# Patient Record
Sex: FEMALE | Race: WHITE | NOT HISPANIC OR LATINO | Employment: UNEMPLOYED | URBAN - NONMETROPOLITAN AREA
[De-identification: names, ages, dates, MRNs, and addresses within clinical notes are randomized per-mention and may not be internally consistent; named-entity substitution may affect disease eponyms.]

---

## 2022-08-22 LAB
CHOLESTEROL, TOTAL: 232 MG/DL (ref 0–199)
HBA1C MFR BLD: 6 % (ref 4–5.6)
HDLC SERPL-MCNC: 45 MG/DL
LDL CHOLESTEROL CALCULATED: 132 MG/DL (ref 0–99)
TRIGL SERPL-MCNC: 277 MG/DL (ref 0–149)
VLDLC SERPL CALC-MCNC: 55 MG/DL

## 2024-04-10 ENCOUNTER — HOSPITAL ENCOUNTER (INPATIENT)
Facility: HOSPITAL | Age: 59
LOS: 3 days | Discharge: HOME | End: 2024-04-13
Attending: EMERGENCY MEDICINE | Admitting: INTERNAL MEDICINE
Payer: MEDICAID

## 2024-04-10 ENCOUNTER — APPOINTMENT (OUTPATIENT)
Dept: RADIOLOGY | Facility: HOSPITAL | Age: 59
End: 2024-04-10
Payer: MEDICAID

## 2024-04-10 ENCOUNTER — APPOINTMENT (OUTPATIENT)
Dept: CARDIOLOGY | Facility: HOSPITAL | Age: 59
End: 2024-04-10
Payer: MEDICAID

## 2024-04-10 DIAGNOSIS — E87.1 HYPONATREMIA: ICD-10-CM

## 2024-04-10 DIAGNOSIS — E87.6 HYPOKALEMIA: ICD-10-CM

## 2024-04-10 DIAGNOSIS — R56.9 SEIZURE (MULTI): Primary | ICD-10-CM

## 2024-04-10 DIAGNOSIS — E83.42 HYPOMAGNESEMIA: ICD-10-CM

## 2024-04-10 DIAGNOSIS — E03.9 HYPOTHYROIDISM, UNSPECIFIED TYPE: ICD-10-CM

## 2024-04-10 DIAGNOSIS — K59.00 CONSTIPATION, UNSPECIFIED CONSTIPATION TYPE: ICD-10-CM

## 2024-04-10 LAB
ALBUMIN SERPL BCP-MCNC: 4.4 G/DL (ref 3.4–5)
ALP SERPL-CCNC: 62 U/L (ref 33–110)
ALT SERPL W P-5'-P-CCNC: 15 U/L (ref 7–45)
ANION GAP SERPL CALC-SCNC: 13 MMOL/L (ref 10–20)
APPEARANCE UR: CLEAR
AST SERPL W P-5'-P-CCNC: 17 U/L (ref 9–39)
BASOPHILS # BLD AUTO: 0.09 X10*3/UL (ref 0–0.1)
BASOPHILS NFR BLD AUTO: 0.6 %
BILIRUB SERPL-MCNC: 0.3 MG/DL (ref 0–1.2)
BILIRUB UR STRIP.AUTO-MCNC: NEGATIVE MG/DL
BUN SERPL-MCNC: 10 MG/DL (ref 6–23)
CALCIUM SERPL-MCNC: 9.2 MG/DL (ref 8.6–10.3)
CARDIAC TROPONIN I PNL SERPL HS: 8 NG/L (ref 0–13)
CHLORIDE SERPL-SCNC: 87 MMOL/L (ref 98–107)
CO2 SERPL-SCNC: 23 MMOL/L (ref 21–32)
COLOR UR: NORMAL
CREAT SERPL-MCNC: 0.99 MG/DL (ref 0.5–1.05)
EGFRCR SERPLBLD CKD-EPI 2021: 67 ML/MIN/1.73M*2
EOSINOPHIL # BLD AUTO: 0.11 X10*3/UL (ref 0–0.7)
EOSINOPHIL NFR BLD AUTO: 0.8 %
ERYTHROCYTE [DISTWIDTH] IN BLOOD BY AUTOMATED COUNT: 13.2 % (ref 11.5–14.5)
GLUCOSE SERPL-MCNC: 102 MG/DL (ref 74–99)
GLUCOSE UR STRIP.AUTO-MCNC: NORMAL MG/DL
HCT VFR BLD AUTO: 36 % (ref 36–46)
HGB BLD-MCNC: 12.8 G/DL (ref 12–16)
IMM GRANULOCYTES # BLD AUTO: 0.07 X10*3/UL (ref 0–0.7)
IMM GRANULOCYTES NFR BLD AUTO: 0.5 % (ref 0–0.9)
KETONES UR STRIP.AUTO-MCNC: NEGATIVE MG/DL
LEUKOCYTE ESTERASE UR QL STRIP.AUTO: NEGATIVE
LYMPHOCYTES # BLD AUTO: 2.32 X10*3/UL (ref 1.2–4.8)
LYMPHOCYTES NFR BLD AUTO: 16.3 %
MAGNESIUM SERPL-MCNC: 1.41 MG/DL (ref 1.6–2.4)
MCH RBC QN AUTO: 32 PG (ref 26–34)
MCHC RBC AUTO-ENTMCNC: 35.6 G/DL (ref 32–36)
MCV RBC AUTO: 90 FL (ref 80–100)
MONOCYTES # BLD AUTO: 0.78 X10*3/UL (ref 0.1–1)
MONOCYTES NFR BLD AUTO: 5.5 %
NEUTROPHILS # BLD AUTO: 10.87 X10*3/UL (ref 1.2–7.7)
NEUTROPHILS NFR BLD AUTO: 76.3 %
NITRITE UR QL STRIP.AUTO: NEGATIVE
NRBC BLD-RTO: 0 /100 WBCS (ref 0–0)
PH UR STRIP.AUTO: 6.5 [PH]
PLATELET # BLD AUTO: 352 X10*3/UL (ref 150–450)
POTASSIUM SERPL-SCNC: 3.1 MMOL/L (ref 3.5–5.3)
PROT SERPL-MCNC: 6.8 G/DL (ref 6.4–8.2)
PROT UR STRIP.AUTO-MCNC: NEGATIVE MG/DL
RBC # BLD AUTO: 4 X10*6/UL (ref 4–5.2)
RBC # UR STRIP.AUTO: NEGATIVE /UL
SODIUM SERPL-SCNC: 120 MMOL/L (ref 136–145)
SP GR UR STRIP.AUTO: 1.01
UROBILINOGEN UR STRIP.AUTO-MCNC: NORMAL MG/DL
WBC # BLD AUTO: 14.2 X10*3/UL (ref 4.4–11.3)

## 2024-04-10 PROCEDURE — 2500000004 HC RX 250 GENERAL PHARMACY W/ HCPCS (ALT 636 FOR OP/ED): Mod: IPSPLIT | Performed by: INTERNAL MEDICINE

## 2024-04-10 PROCEDURE — 2020000001 HC ICU ROOM DAILY: Mod: IPSPLIT

## 2024-04-10 PROCEDURE — 2500000002 HC RX 250 W HCPCS SELF ADMINISTERED DRUGS (ALT 637 FOR MEDICARE OP, ALT 636 FOR OP/ED): Mod: SE | Performed by: EMERGENCY MEDICINE

## 2024-04-10 PROCEDURE — 71250 CT THORAX DX C-: CPT | Performed by: RADIOLOGY

## 2024-04-10 PROCEDURE — 71250 CT THORAX DX C-: CPT

## 2024-04-10 PROCEDURE — 80053 COMPREHEN METABOLIC PANEL: CPT | Performed by: EMERGENCY MEDICINE

## 2024-04-10 PROCEDURE — 9420000001 HC RT PATIENT EDUCATION 5 MIN: Mod: IPSPLIT

## 2024-04-10 PROCEDURE — 70450 CT HEAD/BRAIN W/O DYE: CPT

## 2024-04-10 PROCEDURE — 2500000001 HC RX 250 WO HCPCS SELF ADMINISTERED DRUGS (ALT 637 FOR MEDICARE OP): Mod: IPSPLIT | Performed by: INTERNAL MEDICINE

## 2024-04-10 PROCEDURE — 2500000004 HC RX 250 GENERAL PHARMACY W/ HCPCS (ALT 636 FOR OP/ED): Mod: SE | Performed by: EMERGENCY MEDICINE

## 2024-04-10 PROCEDURE — 2500000001 HC RX 250 WO HCPCS SELF ADMINISTERED DRUGS (ALT 637 FOR MEDICARE OP): Mod: SE | Performed by: EMERGENCY MEDICINE

## 2024-04-10 PROCEDURE — 99285 EMERGENCY DEPT VISIT HI MDM: CPT | Mod: 25

## 2024-04-10 PROCEDURE — 83735 ASSAY OF MAGNESIUM: CPT | Performed by: EMERGENCY MEDICINE

## 2024-04-10 PROCEDURE — 70450 CT HEAD/BRAIN W/O DYE: CPT | Performed by: RADIOLOGY

## 2024-04-10 PROCEDURE — 81003 URINALYSIS AUTO W/O SCOPE: CPT | Performed by: EMERGENCY MEDICINE

## 2024-04-10 PROCEDURE — 93010 ELECTROCARDIOGRAM REPORT: CPT | Performed by: INTERNAL MEDICINE

## 2024-04-10 PROCEDURE — 93005 ELECTROCARDIOGRAM TRACING: CPT | Mod: IPSPLIT

## 2024-04-10 PROCEDURE — G0426 INPT/ED TELECONSULT50: HCPCS | Performed by: INTERNAL MEDICINE

## 2024-04-10 PROCEDURE — 36415 COLL VENOUS BLD VENIPUNCTURE: CPT | Performed by: EMERGENCY MEDICINE

## 2024-04-10 PROCEDURE — 85025 COMPLETE CBC W/AUTO DIFF WBC: CPT | Performed by: EMERGENCY MEDICINE

## 2024-04-10 PROCEDURE — 84484 ASSAY OF TROPONIN QUANT: CPT | Performed by: EMERGENCY MEDICINE

## 2024-04-10 RX ORDER — MAGNESIUM SULFATE HEPTAHYDRATE 40 MG/ML
2 INJECTION, SOLUTION INTRAVENOUS ONCE
Status: COMPLETED | OUTPATIENT
Start: 2024-04-10 | End: 2024-04-10

## 2024-04-10 RX ORDER — FLUTICASONE FUROATE AND VILANTEROL 100; 25 UG/1; UG/1
1 POWDER RESPIRATORY (INHALATION)
Status: DISCONTINUED | OUTPATIENT
Start: 2024-04-11 | End: 2024-04-13 | Stop reason: HOSPADM

## 2024-04-10 RX ORDER — LACOSAMIDE 100 MG/1
50 TABLET ORAL ONCE
Status: COMPLETED | OUTPATIENT
Start: 2024-04-10 | End: 2024-04-10

## 2024-04-10 RX ORDER — POTASSIUM CHLORIDE 14.9 MG/ML
20 INJECTION INTRAVENOUS ONCE
Status: COMPLETED | OUTPATIENT
Start: 2024-04-10 | End: 2024-04-11

## 2024-04-10 RX ORDER — MIDAZOLAM HYDROCHLORIDE 1 MG/ML
2 INJECTION INTRAMUSCULAR; INTRAVENOUS ONCE
Status: COMPLETED | OUTPATIENT
Start: 2024-04-10 | End: 2024-04-10

## 2024-04-10 RX ORDER — LAMOTRIGINE 25 MG/1
75 TABLET ORAL DAILY
Status: DISCONTINUED | OUTPATIENT
Start: 2024-04-11 | End: 2024-04-13 | Stop reason: HOSPADM

## 2024-04-10 RX ORDER — IPRATROPIUM BROMIDE AND ALBUTEROL SULFATE 2.5; .5 MG/3ML; MG/3ML
3 SOLUTION RESPIRATORY (INHALATION) EVERY 6 HOURS PRN
Status: DISCONTINUED | OUTPATIENT
Start: 2024-04-10 | End: 2024-04-11

## 2024-04-10 RX ORDER — GABAPENTIN 300 MG/1
300 CAPSULE ORAL EVERY 8 HOURS SCHEDULED
Status: DISCONTINUED | OUTPATIENT
Start: 2024-04-10 | End: 2024-04-13 | Stop reason: HOSPADM

## 2024-04-10 RX ORDER — TALC
6 POWDER (GRAM) TOPICAL NIGHTLY
Status: DISCONTINUED | OUTPATIENT
Start: 2024-04-10 | End: 2024-04-13 | Stop reason: HOSPADM

## 2024-04-10 RX ORDER — MAGNESIUM SULFATE HEPTAHYDRATE 40 MG/ML
2 INJECTION, SOLUTION INTRAVENOUS ONCE
Status: COMPLETED | OUTPATIENT
Start: 2024-04-10 | End: 2024-04-11

## 2024-04-10 RX ORDER — POTASSIUM CHLORIDE 20 MEQ/1
40 TABLET, EXTENDED RELEASE ORAL ONCE
Status: COMPLETED | OUTPATIENT
Start: 2024-04-10 | End: 2024-04-10

## 2024-04-10 RX ADMIN — Medication 6 MG: at 23:31

## 2024-04-10 RX ADMIN — MAGNESIUM SULFATE IN WATER 2 G: 40 INJECTION, SOLUTION INTRAVENOUS at 23:24

## 2024-04-10 RX ADMIN — MIDAZOLAM HYDROCHLORIDE 2 MG: 1 INJECTION, SOLUTION INTRAMUSCULAR; INTRAVENOUS at 17:52

## 2024-04-10 RX ADMIN — SODIUM CHLORIDE 1000 ML: 9 INJECTION, SOLUTION INTRAVENOUS at 17:52

## 2024-04-10 RX ADMIN — MAGNESIUM SULFATE IN WATER 2 G: 40 INJECTION, SOLUTION INTRAVENOUS at 18:02

## 2024-04-10 RX ADMIN — LACOSAMIDE 50 MG: 100 TABLET, FILM COATED ORAL at 17:51

## 2024-04-10 RX ADMIN — GABAPENTIN 300 MG: 300 CAPSULE ORAL at 23:31

## 2024-04-10 RX ADMIN — POTASSIUM CHLORIDE 40 MEQ: 1500 TABLET, EXTENDED RELEASE ORAL at 18:04

## 2024-04-10 RX ADMIN — POTASSIUM CHLORIDE 20 MEQ: 200 INJECTION, SOLUTION INTRAVENOUS at 22:39

## 2024-04-10 SDOH — SOCIAL STABILITY: SOCIAL INSECURITY: DO YOU FEEL UNSAFE GOING BACK TO THE PLACE WHERE YOU ARE LIVING?: NO

## 2024-04-10 SDOH — SOCIAL STABILITY: SOCIAL INSECURITY: WERE YOU ABLE TO COMPLETE ALL THE BEHAVIORAL HEALTH SCREENINGS?: YES

## 2024-04-10 SDOH — SOCIAL STABILITY: SOCIAL INSECURITY: DOES ANYONE TRY TO KEEP YOU FROM HAVING/CONTACTING OTHER FRIENDS OR DOING THINGS OUTSIDE YOUR HOME?: NO

## 2024-04-10 SDOH — SOCIAL STABILITY: SOCIAL INSECURITY: DO YOU FEEL ANYONE HAS EXPLOITED OR TAKEN ADVANTAGE OF YOU FINANCIALLY OR OF YOUR PERSONAL PROPERTY?: NO

## 2024-04-10 SDOH — SOCIAL STABILITY: SOCIAL INSECURITY: ABUSE: ADULT

## 2024-04-10 SDOH — SOCIAL STABILITY: SOCIAL INSECURITY: HAVE YOU HAD THOUGHTS OF HARMING ANYONE ELSE?: NO

## 2024-04-10 SDOH — SOCIAL STABILITY: SOCIAL INSECURITY: HAS ANYONE EVER THREATENED TO HURT YOUR FAMILY OR YOUR PETS?: NO

## 2024-04-10 SDOH — SOCIAL STABILITY: SOCIAL INSECURITY: ARE THERE ANY APPARENT SIGNS OF INJURIES/BEHAVIORS THAT COULD BE RELATED TO ABUSE/NEGLECT?: NO

## 2024-04-10 SDOH — SOCIAL STABILITY: SOCIAL INSECURITY: ARE YOU OR HAVE YOU BEEN THREATENED OR ABUSED PHYSICALLY, EMOTIONALLY, OR SEXUALLY BY ANYONE?: NO

## 2024-04-10 ASSESSMENT — COGNITIVE AND FUNCTIONAL STATUS - GENERAL
TURNING FROM BACK TO SIDE WHILE IN FLAT BAD: A LITTLE
HELP NEEDED FOR BATHING: A LITTLE
MOVING TO AND FROM BED TO CHAIR: A LITTLE
DRESSING REGULAR UPPER BODY CLOTHING: A LITTLE
PATIENT BASELINE BEDBOUND: NO
DAILY ACTIVITIY SCORE: 19
MOVING FROM LYING ON BACK TO SITTING ON SIDE OF FLAT BED WITH BEDRAILS: A LITTLE
TOILETING: A LITTLE
WALKING IN HOSPITAL ROOM: A LITTLE
STANDING UP FROM CHAIR USING ARMS: A LITTLE
CLIMB 3 TO 5 STEPS WITH RAILING: A LITTLE
MOBILITY SCORE: 18
DRESSING REGULAR LOWER BODY CLOTHING: A LITTLE
PERSONAL GROOMING: A LITTLE

## 2024-04-10 ASSESSMENT — LIFESTYLE VARIABLES
AUDIT-C TOTAL SCORE: 1
SUBSTANCE_ABUSE_PAST_12_MONTHS: NO
HOW OFTEN DO YOU HAVE 6 OR MORE DRINKS ON ONE OCCASION: NEVER
AUDIT-C TOTAL SCORE: 1
PRESCIPTION_ABUSE_PAST_12_MONTHS: NO
SKIP TO QUESTIONS 9-10: 1
HOW OFTEN DO YOU HAVE A DRINK CONTAINING ALCOHOL: MONTHLY OR LESS
HOW MANY STANDARD DRINKS CONTAINING ALCOHOL DO YOU HAVE ON A TYPICAL DAY: 1 OR 2

## 2024-04-10 ASSESSMENT — ACTIVITIES OF DAILY LIVING (ADL)
WALKS IN HOME: INDEPENDENT
HEARING - RIGHT EAR: FUNCTIONAL
ADEQUATE_TO_COMPLETE_ADL: YES
BATHING: NEEDS ASSISTANCE
JUDGMENT_ADEQUATE_SAFELY_COMPLETE_DAILY_ACTIVITIES: NO
TOILETING: NEEDS ASSISTANCE
HEARING - LEFT EAR: FUNCTIONAL
GROOMING: NEEDS ASSISTANCE
FEEDING YOURSELF: NEEDS ASSISTANCE
DRESSING YOURSELF: NEEDS ASSISTANCE
LACK_OF_TRANSPORTATION: NO
LACK_OF_TRANSPORTATION: NO
PATIENT'S MEMORY ADEQUATE TO SAFELY COMPLETE DAILY ACTIVITIES?: YES

## 2024-04-10 ASSESSMENT — PAIN SCALES - GENERAL
PAINLEVEL_OUTOF10: 5 - MODERATE PAIN
PAINLEVEL_OUTOF10: 10 - WORST POSSIBLE PAIN

## 2024-04-10 ASSESSMENT — COLUMBIA-SUICIDE SEVERITY RATING SCALE - C-SSRS
1. IN THE PAST MONTH, HAVE YOU WISHED YOU WERE DEAD OR WISHED YOU COULD GO TO SLEEP AND NOT WAKE UP?: NO
2. HAVE YOU ACTUALLY HAD ANY THOUGHTS OF KILLING YOURSELF?: NO
2. HAVE YOU ACTUALLY HAD ANY THOUGHTS OF KILLING YOURSELF?: NO
6. HAVE YOU EVER DONE ANYTHING, STARTED TO DO ANYTHING, OR PREPARED TO DO ANYTHING TO END YOUR LIFE?: NO
6. HAVE YOU EVER DONE ANYTHING, STARTED TO DO ANYTHING, OR PREPARED TO DO ANYTHING TO END YOUR LIFE?: NO
1. IN THE PAST MONTH, HAVE YOU WISHED YOU WERE DEAD OR WISHED YOU COULD GO TO SLEEP AND NOT WAKE UP?: NO

## 2024-04-10 ASSESSMENT — PATIENT HEALTH QUESTIONNAIRE - PHQ9
1. LITTLE INTEREST OR PLEASURE IN DOING THINGS: NOT AT ALL
2. FEELING DOWN, DEPRESSED OR HOPELESS: NOT AT ALL
SUM OF ALL RESPONSES TO PHQ9 QUESTIONS 1 & 2: 0

## 2024-04-10 ASSESSMENT — PAIN DESCRIPTION - LOCATION: LOCATION: HEAD

## 2024-04-10 ASSESSMENT — PAIN DESCRIPTION - PAIN TYPE: TYPE: ACUTE PAIN

## 2024-04-10 ASSESSMENT — PAIN DESCRIPTION - DESCRIPTORS: DESCRIPTORS: SHARP

## 2024-04-10 ASSESSMENT — PAIN - FUNCTIONAL ASSESSMENT: PAIN_FUNCTIONAL_ASSESSMENT: 0-10

## 2024-04-10 NOTE — ED PROVIDER NOTES
HPI   Chief Complaint   Patient presents with    Seizures     Patient is alert  at this time. Patient states she is out of seizure medication has been for 3 days       HPI  Patient is a 58-year-old female presenting to the ED for seizures.  Patient does have a history of seizures but states that she has been out of her Vimpat for the past 3 days.  She states that she forgot to call her neurologist for a refill.  Today, patient had 3 seizures at home witnessed by her sister.  After the third seizure, sister called EMS to bring patient here to the ED for further management.  At this time, patient just reports feeling tired and weak.  She denies any areas of pain.  She denies any headache, neck pain, chest pain, abdominal pain.  She has no additional concerns at this time                  [place] Coma Scale Score: 15                     Patient History   Past Medical History:   Diagnosis Date    Other specified anxiety disorders 01/03/2022    Depression with anxiety     Past Surgical History:   Procedure Laterality Date    FOOT SURGERY  10/13/2014    Foot Surgery     No family history on file.  Social History     Tobacco Use    Smoking status: Not on file    Smokeless tobacco: Not on file   Substance Use Topics    Alcohol use: Not on file    Drug use: Not on file       Physical Exam   ED Triage Vitals   Temperature Heart Rate Respirations BP   04/10/24 1800 04/10/24 1637 04/10/24 1637 04/10/24 1637   36.5 °C (97.7 °F) 54 16 102/68      Pulse Ox Temp Source Heart Rate Source Patient Position   04/10/24 1637 04/10/24 1800 04/10/24 1637 04/10/24 1637   100 % Oral Monitor Lying      BP Location FiO2 (%)     04/10/24 1637 --     Right arm        Physical Exam  Vitals and nursing note reviewed.   Constitutional:       General: She is not in acute distress.     Appearance: She is not toxic-appearing.   HENT:      Head:      Comments: Ecchymosis present to the chin.  No periorbital or postauricular ecchymosis     Mouth/Throat:       Mouth: Mucous membranes are moist.   Eyes:      Extraocular Movements: Extraocular movements intact.      Conjunctiva/sclera: Conjunctivae normal.      Pupils: Pupils are equal, round, and reactive to light.   Cardiovascular:      Rate and Rhythm: Regular rhythm. Bradycardia present.      Pulses: Normal pulses.   Pulmonary:      Effort: Pulmonary effort is normal. No respiratory distress.      Breath sounds: Normal breath sounds. No wheezing.   Abdominal:      General: There is no distension.      Palpations: Abdomen is soft.      Tenderness: There is no abdominal tenderness.   Musculoskeletal:         General: No swelling.      Cervical back: Neck supple.   Skin:     General: Skin is warm and dry.      Capillary Refill: Capillary refill takes less than 2 seconds.   Neurological:      General: No focal deficit present.      Mental Status: She is alert. Mental status is at baseline.      Comments: This patient is awake, alert and oriented to person, place and time. Speech is clear and fluent. Cranial nerves II-XII are grossly intact. Strength and sensation are intact in all extremities. No limb ataxia. No pronator drift. Patellar reflex normal and symmetric. No dysmetria detected on finger-to-nose test.         ED Course & MDM   ED Course as of 04/10/24 1848   Wed Apr 10, 2024   1640 EKG obtained at 1636, interpreted by myself.  Sinus bradycardia with a ventricular rate of 55, first-degree AV block present with prolonged SC interval of 218, right bundle branch block present, otherwise normal intervals with no acute ischemic changes [VT]      ED Course User Index  [VT] Reema ALVAREZ MD         Diagnoses as of 04/10/24 1848   Seizure (CMS/[facility])   Hyponatremia   Hypokalemia   Hypomagnesemia       Medical Decision Making  Patient was seen and evaluated in the ED for seizure in the setting of medication noncompliance.  Differential diagnosis also includes but is not limited to infection, dehydration, electrolyte  abnormality.  Patient is placed in seizure precautions.  She is put on a cardiac monitor with continuous pulse ox.  Peripheral IV is established and patient is started on a 1 L bolus of normal saline.  She is also administered Versed 2 mg IV as well as her home dose of Vimpat 50 mg.  Additional labs and imaging are ordered for further evaluation of her symptoms.  Lab work is significant for hyponatremia with a sodium of 120.  Patient is also mildly hypokalemic and hypomagnesemic.  She is administered 40 mill equivalents KCl as well as magnesium sulfate 2 g.  CT head wo IV contrast   Final Result   No acute intracranial abnormality.        Chronic changes as noted above.        MACRO:   None        Signed by: Alvin Rinaldi 4/10/2024 6:06 PM   Dictation workstation:   [id number]      CT chest wo IV contrast   Final Result   No acute cardiopulmonary process.        Additional findings as noted above.        MACRO:   None        Signed by: Alvin Rinaldi 4/10/2024 6:11 PM   Dictation workstation:   [id number]      On reevaluation, patient is resting comfortably in bed.  She continues to be slightly bradycardic. Patient was informed of their lab and imaging results, and all questions and concerns were answered.  Given her significant hyponatremia, admission planning for further management was discussed at this time, to which the patient was agreeable.  I discussed the case with JOSE Blair on-call for the hospitalist service, who accepts patient for admission under Dr. Blackburn.     Procedure  Procedures     Reema ALVAREZ MD  04/10/24 2864

## 2024-04-11 ENCOUNTER — APPOINTMENT (OUTPATIENT)
Dept: CARDIOLOGY | Facility: HOSPITAL | Age: 59
End: 2024-04-11
Payer: MEDICAID

## 2024-04-11 LAB
ALBUMIN SERPL BCP-MCNC: 4.4 G/DL (ref 3.4–5)
ALP SERPL-CCNC: 55 U/L (ref 33–110)
ALT SERPL W P-5'-P-CCNC: 14 U/L (ref 7–45)
ANION GAP SERPL CALC-SCNC: 15 MMOL/L (ref 10–20)
AST SERPL W P-5'-P-CCNC: 21 U/L (ref 9–39)
ATRIAL RATE: 55 BPM
BILIRUB SERPL-MCNC: 0.4 MG/DL (ref 0–1.2)
BUN SERPL-MCNC: 8 MG/DL (ref 6–23)
CALCIUM SERPL-MCNC: 9.1 MG/DL (ref 8.6–10.3)
CHLORIDE SERPL-SCNC: 96 MMOL/L (ref 98–107)
CO2 SERPL-SCNC: 16 MMOL/L (ref 21–32)
CREAT SERPL-MCNC: 0.94 MG/DL (ref 0.5–1.05)
EGFRCR SERPLBLD CKD-EPI 2021: 71 ML/MIN/1.73M*2
ERYTHROCYTE [DISTWIDTH] IN BLOOD BY AUTOMATED COUNT: 13.4 % (ref 11.5–14.5)
GLUCOSE SERPL-MCNC: 108 MG/DL (ref 74–99)
HCT VFR BLD AUTO: 36.8 % (ref 36–46)
HGB BLD-MCNC: 12.7 G/DL (ref 12–16)
HOLD SPECIMEN: NORMAL
MAGNESIUM SERPL-MCNC: 2.43 MG/DL (ref 1.6–2.4)
MCH RBC QN AUTO: 31.8 PG (ref 26–34)
MCHC RBC AUTO-ENTMCNC: 34.5 G/DL (ref 32–36)
MCV RBC AUTO: 92 FL (ref 80–100)
NRBC BLD-RTO: 0 /100 WBCS (ref 0–0)
P AXIS: 58 DEGREES
P OFFSET: 170 MS
P ONSET: 116 MS
PHOSPHATE SERPL-MCNC: 2.3 MG/DL (ref 2.5–4.9)
PLATELET # BLD AUTO: 315 X10*3/UL (ref 150–450)
POTASSIUM SERPL-SCNC: 4 MMOL/L (ref 3.5–5.3)
PR INTERVAL: 218 MS
PROT SERPL-MCNC: 6.7 G/DL (ref 6.4–8.2)
Q ONSET: 225 MS
QRS COUNT: 9 BEATS
QRS DURATION: 104 MS
QT INTERVAL: 498 MS
QTC CALCULATION(BAZETT): 476 MS
QTC FREDERICIA: 483 MS
R AXIS: 9 DEGREES
RBC # BLD AUTO: 3.99 X10*6/UL (ref 4–5.2)
SODIUM SERPL-SCNC: 123 MMOL/L (ref 136–145)
T AXIS: 44 DEGREES
T OFFSET: 474 MS
T4 FREE SERPL-MCNC: <0.25 NG/DL (ref 0.61–1.12)
TSH SERPL-ACNC: 117 MIU/L (ref 0.44–3.98)
VENTRICULAR RATE: 55 BPM
WBC # BLD AUTO: 10.3 X10*3/UL (ref 4.4–11.3)

## 2024-04-11 PROCEDURE — 94640 AIRWAY INHALATION TREATMENT: CPT | Mod: IPSPLIT

## 2024-04-11 PROCEDURE — 94668 MNPJ CHEST WALL SBSQ: CPT | Mod: IPSPLIT

## 2024-04-11 PROCEDURE — 2500000001 HC RX 250 WO HCPCS SELF ADMINISTERED DRUGS (ALT 637 FOR MEDICARE OP): Mod: IPSPLIT | Performed by: NURSE PRACTITIONER

## 2024-04-11 PROCEDURE — 1200000002 HC GENERAL ROOM WITH TELEMETRY DAILY: Mod: IPSPLIT

## 2024-04-11 PROCEDURE — 2500000001 HC RX 250 WO HCPCS SELF ADMINISTERED DRUGS (ALT 637 FOR MEDICARE OP): Mod: IPSPLIT | Performed by: INTERNAL MEDICINE

## 2024-04-11 PROCEDURE — 93005 ELECTROCARDIOGRAM TRACING: CPT | Mod: IPSPLIT

## 2024-04-11 PROCEDURE — 84443 ASSAY THYROID STIM HORMONE: CPT | Mod: IPSPLIT | Performed by: NURSE PRACTITIONER

## 2024-04-11 PROCEDURE — 84100 ASSAY OF PHOSPHORUS: CPT | Mod: IPSPLIT | Performed by: INTERNAL MEDICINE

## 2024-04-11 PROCEDURE — 99221 1ST HOSP IP/OBS SF/LOW 40: CPT | Performed by: NURSE PRACTITIONER

## 2024-04-11 PROCEDURE — 2500000004 HC RX 250 GENERAL PHARMACY W/ HCPCS (ALT 636 FOR OP/ED): Mod: IPSPLIT

## 2024-04-11 PROCEDURE — 99223 1ST HOSP IP/OBS HIGH 75: CPT | Performed by: NURSE PRACTITIONER

## 2024-04-11 PROCEDURE — 2500000002 HC RX 250 W HCPCS SELF ADMINISTERED DRUGS (ALT 637 FOR MEDICARE OP, ALT 636 FOR OP/ED): Mod: IPSPLIT | Performed by: NURSE PRACTITIONER

## 2024-04-11 PROCEDURE — 94664 DEMO&/EVAL PT USE INHALER: CPT | Mod: IPSPLIT

## 2024-04-11 PROCEDURE — 83735 ASSAY OF MAGNESIUM: CPT | Mod: IPSPLIT | Performed by: INTERNAL MEDICINE

## 2024-04-11 PROCEDURE — 94667 MNPJ CHEST WALL 1ST: CPT | Mod: IPSPLIT

## 2024-04-11 PROCEDURE — 36415 COLL VENOUS BLD VENIPUNCTURE: CPT | Mod: IPSPLIT | Performed by: INTERNAL MEDICINE

## 2024-04-11 PROCEDURE — 84439 ASSAY OF FREE THYROXINE: CPT | Mod: IPSPLIT | Performed by: NURSE PRACTITIONER

## 2024-04-11 PROCEDURE — 80053 COMPREHEN METABOLIC PANEL: CPT | Mod: IPSPLIT | Performed by: INTERNAL MEDICINE

## 2024-04-11 PROCEDURE — 85027 COMPLETE CBC AUTOMATED: CPT | Mod: IPSPLIT | Performed by: INTERNAL MEDICINE

## 2024-04-11 PROCEDURE — 9420000001 HC RT PATIENT EDUCATION 5 MIN: Mod: IPSPLIT

## 2024-04-11 RX ORDER — SUMATRIPTAN SUCCINATE 25 MG/1
100 TABLET ORAL ONCE
Status: COMPLETED | OUTPATIENT
Start: 2024-04-11 | End: 2024-04-11

## 2024-04-11 RX ORDER — TRAZODONE HYDROCHLORIDE 100 MG/1
300 TABLET ORAL NIGHTLY
Status: DISCONTINUED | OUTPATIENT
Start: 2024-04-11 | End: 2024-04-13 | Stop reason: HOSPADM

## 2024-04-11 RX ORDER — DIPHENOXYLATE HYDROCHLORIDE AND ATROPINE SULFATE 2.5; .025 MG/1; MG/1
1 TABLET ORAL 3 TIMES DAILY PRN
Status: DISCONTINUED | OUTPATIENT
Start: 2024-04-11 | End: 2024-04-13 | Stop reason: HOSPADM

## 2024-04-11 RX ORDER — BISACODYL 10 MG/1
10 SUPPOSITORY RECTAL DAILY PRN
Status: DISCONTINUED | OUTPATIENT
Start: 2024-04-11 | End: 2024-04-13 | Stop reason: HOSPADM

## 2024-04-11 RX ORDER — LACOSAMIDE 50 MG/1
50 TABLET ORAL 2 TIMES DAILY
COMMUNITY

## 2024-04-11 RX ORDER — BENZTROPINE MESYLATE 1 MG/1
0.5 TABLET ORAL 2 TIMES DAILY
Status: DISCONTINUED | OUTPATIENT
Start: 2024-04-11 | End: 2024-04-13 | Stop reason: HOSPADM

## 2024-04-11 RX ORDER — HALOPERIDOL DECANOATE 50 MG/ML
75 INJECTION INTRAMUSCULAR
COMMUNITY
Start: 2024-02-15

## 2024-04-11 RX ORDER — LAMOTRIGINE 25 MG/1
75 TABLET ORAL DAILY
COMMUNITY

## 2024-04-11 RX ORDER — LACOSAMIDE 100 MG/1
50 TABLET ORAL ONCE
Status: COMPLETED | OUTPATIENT
Start: 2024-04-11 | End: 2024-04-11

## 2024-04-11 RX ORDER — ATORVASTATIN CALCIUM 40 MG/1
80 TABLET, FILM COATED ORAL NIGHTLY
Status: DISCONTINUED | OUTPATIENT
Start: 2024-04-11 | End: 2024-04-11

## 2024-04-11 RX ORDER — FENOFIBRATE 160 MG/1
160 TABLET ORAL DAILY
Status: DISCONTINUED | OUTPATIENT
Start: 2024-04-11 | End: 2024-04-13 | Stop reason: HOSPADM

## 2024-04-11 RX ORDER — LORATADINE 10 MG/1
10 TABLET ORAL DAILY PRN
COMMUNITY
Start: 2024-03-01

## 2024-04-11 RX ORDER — PRAZOSIN HYDROCHLORIDE 1 MG/1
1 CAPSULE ORAL NIGHTLY PRN
COMMUNITY
Start: 2023-09-07

## 2024-04-11 RX ORDER — PANTOPRAZOLE SODIUM 40 MG/1
40 TABLET, DELAYED RELEASE ORAL
Status: DISCONTINUED | OUTPATIENT
Start: 2024-04-11 | End: 2024-04-13 | Stop reason: HOSPADM

## 2024-04-11 RX ORDER — POLYETHYLENE GLYCOL 3350 17 G/17G
17 POWDER, FOR SOLUTION ORAL DAILY PRN
Status: DISCONTINUED | OUTPATIENT
Start: 2024-04-11 | End: 2024-04-13 | Stop reason: HOSPADM

## 2024-04-11 RX ORDER — LISINOPRIL 40 MG/1
40 TABLET ORAL DAILY
COMMUNITY
Start: 2024-03-11

## 2024-04-11 RX ORDER — DIPHENOXYLATE HYDROCHLORIDE AND ATROPINE SULFATE 2.5; .025 MG/1; MG/1
1 TABLET ORAL 3 TIMES DAILY PRN
COMMUNITY

## 2024-04-11 RX ORDER — TRAZODONE HYDROCHLORIDE 300 MG/1
300 TABLET ORAL NIGHTLY
COMMUNITY
Start: 2024-03-12

## 2024-04-11 RX ORDER — MOMETASONE FUROATE AND FORMOTEROL FUMARATE DIHYDRATE 200; 5 UG/1; UG/1
2 AEROSOL RESPIRATORY (INHALATION) 2 TIMES DAILY
COMMUNITY
Start: 2023-11-08

## 2024-04-11 RX ORDER — CHOLECALCIFEROL (VITAMIN D3) 50 MCG
2000 TABLET ORAL
COMMUNITY
Start: 2022-04-04

## 2024-04-11 RX ORDER — VILAZODONE HYDROCHLORIDE 40 MG/1
40 TABLET ORAL
COMMUNITY
Start: 2023-07-05

## 2024-04-11 RX ORDER — PNV NO.95/FERROUS FUM/FOLIC AC 28MG-0.8MG
100 TABLET ORAL DAILY
Status: DISCONTINUED | OUTPATIENT
Start: 2024-04-11 | End: 2024-04-13 | Stop reason: HOSPADM

## 2024-04-11 RX ORDER — ZOLPIDEM TARTRATE 10 MG/1
10 TABLET ORAL NIGHTLY PRN
COMMUNITY
Start: 2024-04-01

## 2024-04-11 RX ORDER — OMEGA-3-ACID ETHYL ESTERS 1 G/1
1 CAPSULE, LIQUID FILLED ORAL 2 TIMES DAILY
Status: ON HOLD | COMMUNITY
End: 2024-04-11 | Stop reason: SDUPTHER

## 2024-04-11 RX ORDER — FENOFIBRATE 160 MG/1
160 TABLET ORAL DAILY
COMMUNITY
Start: 2023-12-14

## 2024-04-11 RX ORDER — LISINOPRIL 20 MG/1
40 TABLET ORAL DAILY
Status: DISCONTINUED | OUTPATIENT
Start: 2024-04-11 | End: 2024-04-13 | Stop reason: HOSPADM

## 2024-04-11 RX ORDER — GABAPENTIN 300 MG/1
300 CAPSULE ORAL 3 TIMES DAILY
COMMUNITY

## 2024-04-11 RX ORDER — VILAZODONE HYDROCHLORIDE 20 MG/1
40 TABLET ORAL
Status: DISCONTINUED | OUTPATIENT
Start: 2024-04-12 | End: 2024-04-13 | Stop reason: HOSPADM

## 2024-04-11 RX ORDER — LANOLIN ALCOHOL/MO/W.PET/CERES
100 CREAM (GRAM) TOPICAL DAILY
Status: DISCONTINUED | OUTPATIENT
Start: 2024-04-11 | End: 2024-04-13 | Stop reason: HOSPADM

## 2024-04-11 RX ORDER — PRAZOSIN HYDROCHLORIDE 1 MG/1
1 CAPSULE ORAL NIGHTLY PRN
Status: DISCONTINUED | OUTPATIENT
Start: 2024-04-11 | End: 2024-04-13 | Stop reason: HOSPADM

## 2024-04-11 RX ORDER — LANOLIN ALCOHOL/MO/W.PET/CERES
100 CREAM (GRAM) TOPICAL DAILY
COMMUNITY
Start: 2014-09-18

## 2024-04-11 RX ORDER — MIRTAZAPINE 15 MG/1
15 TABLET, FILM COATED ORAL NIGHTLY
COMMUNITY
Start: 2023-11-14

## 2024-04-11 RX ORDER — PROMETHAZINE HYDROCHLORIDE 12.5 MG/1
12.5 TABLET ORAL EVERY 8 HOURS PRN
COMMUNITY
Start: 2024-04-01 | End: 2024-04-15

## 2024-04-11 RX ORDER — PROMETHAZINE HYDROCHLORIDE 12.5 MG/1
12.5 TABLET ORAL EVERY 6 HOURS PRN
COMMUNITY

## 2024-04-11 RX ORDER — TALC
3 POWDER (GRAM) TOPICAL NIGHTLY PRN
Status: DISCONTINUED | OUTPATIENT
Start: 2024-04-11 | End: 2024-04-13 | Stop reason: HOSPADM

## 2024-04-11 RX ORDER — ENOXAPARIN SODIUM 100 MG/ML
40 INJECTION SUBCUTANEOUS EVERY 24 HOURS
Status: DISCONTINUED | OUTPATIENT
Start: 2024-04-11 | End: 2024-04-13 | Stop reason: HOSPADM

## 2024-04-11 RX ORDER — BENZTROPINE MESYLATE 0.5 MG/1
0.5 TABLET ORAL 2 TIMES DAILY
COMMUNITY
Start: 2023-04-04

## 2024-04-11 RX ORDER — LEVOTHYROXINE SODIUM 75 UG/1
150 TABLET ORAL DAILY
Status: DISCONTINUED | OUTPATIENT
Start: 2024-04-11 | End: 2024-04-13 | Stop reason: HOSPADM

## 2024-04-11 RX ORDER — OMEGA-3 FATTY ACIDS 1000 MG
1000 CAPSULE ORAL 2 TIMES DAILY
COMMUNITY
Start: 2024-04-01

## 2024-04-11 RX ORDER — ATORVASTATIN CALCIUM 40 MG/1
80 TABLET, FILM COATED ORAL NIGHTLY
Status: DISCONTINUED | OUTPATIENT
Start: 2024-04-11 | End: 2024-04-13 | Stop reason: HOSPADM

## 2024-04-11 RX ORDER — LORATADINE 10 MG/1
10 TABLET ORAL DAILY PRN
Status: DISCONTINUED | OUTPATIENT
Start: 2024-04-11 | End: 2024-04-13 | Stop reason: HOSPADM

## 2024-04-11 RX ORDER — PANTOPRAZOLE SODIUM 40 MG/1
40 TABLET, DELAYED RELEASE ORAL
COMMUNITY
Start: 2023-11-15

## 2024-04-11 RX ORDER — TIZANIDINE 4 MG/1
4 TABLET ORAL EVERY 8 HOURS PRN
COMMUNITY
Start: 2021-04-01

## 2024-04-11 RX ORDER — PRAZOSIN HYDROCHLORIDE 1 MG/1
1 CAPSULE ORAL NIGHTLY
Status: DISCONTINUED | OUTPATIENT
Start: 2024-04-11 | End: 2024-04-13 | Stop reason: HOSPADM

## 2024-04-11 RX ORDER — ACETAMINOPHEN 325 MG/1
650 TABLET ORAL EVERY 4 HOURS PRN
Status: DISCONTINUED | OUTPATIENT
Start: 2024-04-11 | End: 2024-04-13 | Stop reason: HOSPADM

## 2024-04-11 RX ORDER — BUSPIRONE HYDROCHLORIDE 15 MG/1
15 TABLET ORAL 2 TIMES DAILY
COMMUNITY
Start: 2022-12-12

## 2024-04-11 RX ORDER — SODIUM CHLORIDE 9 MG/ML
10 INJECTION, SOLUTION INTRAVENOUS CONTINUOUS PRN
Status: DISCONTINUED | OUTPATIENT
Start: 2024-04-11 | End: 2024-04-13 | Stop reason: HOSPADM

## 2024-04-11 RX ORDER — IPRATROPIUM BROMIDE AND ALBUTEROL SULFATE 2.5; .5 MG/3ML; MG/3ML
3 SOLUTION RESPIRATORY (INHALATION) EVERY 2 HOUR PRN
Status: DISCONTINUED | OUTPATIENT
Start: 2024-04-11 | End: 2024-04-13 | Stop reason: HOSPADM

## 2024-04-11 RX ORDER — PNV NO.95/FERROUS FUM/FOLIC AC 28MG-0.8MG
100 TABLET ORAL DAILY
COMMUNITY
Start: 2023-11-15

## 2024-04-11 RX ORDER — LEVOTHYROXINE SODIUM 150 UG/1
150 TABLET ORAL
COMMUNITY
Start: 2023-11-15 | End: 2024-04-13 | Stop reason: HOSPADM

## 2024-04-11 RX ORDER — FLUTICASONE PROPIONATE 50 MCG
1 SPRAY, SUSPENSION (ML) NASAL DAILY
COMMUNITY
Start: 2023-05-19

## 2024-04-11 RX ORDER — ATORVASTATIN CALCIUM 80 MG/1
1 TABLET, FILM COATED ORAL NIGHTLY
COMMUNITY
Start: 2023-08-21

## 2024-04-11 RX ORDER — MIRTAZAPINE 15 MG/1
15 TABLET, FILM COATED ORAL NIGHTLY
Status: DISCONTINUED | OUTPATIENT
Start: 2024-04-11 | End: 2024-04-13 | Stop reason: HOSPADM

## 2024-04-11 RX ORDER — ONDANSETRON HYDROCHLORIDE 2 MG/ML
4 INJECTION, SOLUTION INTRAVENOUS EVERY 8 HOURS PRN
Status: DISCONTINUED | OUTPATIENT
Start: 2024-04-11 | End: 2024-04-13 | Stop reason: HOSPADM

## 2024-04-11 RX ORDER — LACOSAMIDE 100 MG/1
50 TABLET ORAL EVERY 12 HOURS SCHEDULED
Status: DISCONTINUED | OUTPATIENT
Start: 2024-04-11 | End: 2024-04-13 | Stop reason: HOSPADM

## 2024-04-11 RX ORDER — MAGNESIUM HYDROXIDE 2400 MG/10ML
10 SUSPENSION ORAL DAILY PRN
Status: DISCONTINUED | OUTPATIENT
Start: 2024-04-11 | End: 2024-04-13 | Stop reason: HOSPADM

## 2024-04-11 RX ADMIN — BUSPIRONE HYDROCHLORIDE 15 MG: 10 TABLET ORAL at 08:51

## 2024-04-11 RX ADMIN — ACETAMINOPHEN 650 MG: 325 TABLET ORAL at 09:11

## 2024-04-11 RX ADMIN — PANTOPRAZOLE SODIUM 40 MG: 40 TABLET, DELAYED RELEASE ORAL at 08:51

## 2024-04-11 RX ADMIN — GABAPENTIN 300 MG: 300 CAPSULE ORAL at 05:22

## 2024-04-11 RX ADMIN — LACOSAMIDE 50 MG: 100 TABLET, FILM COATED ORAL at 08:49

## 2024-04-11 RX ADMIN — LAMOTRIGINE 75 MG: 25 TABLET ORAL at 10:21

## 2024-04-11 RX ADMIN — SUMATRIPTAN SUCCINATE 100 MG: 25 TABLET ORAL at 12:23

## 2024-04-11 RX ADMIN — FLUTICASONE FUROATE AND VILANTEROL TRIFENATATE 1 PUFF: 100; 25 POWDER RESPIRATORY (INHALATION) at 09:48

## 2024-04-11 RX ADMIN — LEVOTHYROXINE SODIUM 150 MCG: 75 TABLET ORAL at 08:50

## 2024-04-11 RX ADMIN — ONDANSETRON 4 MG: 2 INJECTION INTRAMUSCULAR; INTRAVENOUS at 13:31

## 2024-04-11 RX ADMIN — PRAZOSIN HYDROCHLORIDE 1 MG: 1 CAPSULE ORAL at 20:25

## 2024-04-11 RX ADMIN — GABAPENTIN 300 MG: 300 CAPSULE ORAL at 21:00

## 2024-04-11 RX ADMIN — GABAPENTIN 300 MG: 300 CAPSULE ORAL at 13:31

## 2024-04-11 RX ADMIN — UBROGEPANT 100 MG: 100 TABLET ORAL at 14:26

## 2024-04-11 RX ADMIN — VITAM B12 100 MCG: 100 TAB at 08:51

## 2024-04-11 RX ADMIN — ENOXAPARIN SODIUM 40 MG: 40 INJECTION SUBCUTANEOUS at 08:52

## 2024-04-11 RX ADMIN — LISINOPRIL 40 MG: 20 TABLET ORAL at 08:51

## 2024-04-11 RX ADMIN — BUSPIRONE HYDROCHLORIDE 15 MG: 10 TABLET ORAL at 20:26

## 2024-04-11 RX ADMIN — Medication 6 MG: at 20:26

## 2024-04-11 RX ADMIN — LACOSAMIDE 50 MG: 100 TABLET, FILM COATED ORAL at 20:31

## 2024-04-11 RX ADMIN — MIRTAZAPINE 15 MG: 15 TABLET, FILM COATED ORAL at 20:27

## 2024-04-11 RX ADMIN — BENZTROPINE MESYLATE 0.5 MG: 1 TABLET ORAL at 20:26

## 2024-04-11 RX ADMIN — BENZTROPINE MESYLATE 0.5 MG: 1 TABLET ORAL at 08:50

## 2024-04-11 RX ADMIN — ATORVASTATIN CALCIUM 80 MG: 40 TABLET, FILM COATED ORAL at 20:27

## 2024-04-11 RX ADMIN — LACOSAMIDE 50 MG: 100 TABLET, FILM COATED ORAL at 01:54

## 2024-04-11 RX ADMIN — DIPHENOXYLATE HYDROCHLORIDE AND ATROPINE SULFATE 1 TABLET: 2.5; .025 TABLET ORAL at 12:23

## 2024-04-11 RX ADMIN — UBROGEPANT 100 MG: 100 TABLET ORAL at 20:57

## 2024-04-11 RX ADMIN — TRAZODONE HYDROCHLORIDE 300 MG: 100 TABLET ORAL at 20:27

## 2024-04-11 SDOH — ECONOMIC STABILITY: FOOD INSECURITY: WITHIN THE PAST 12 MONTHS, THE FOOD YOU BOUGHT JUST DIDN'T LAST AND YOU DIDN'T HAVE MONEY TO GET MORE.: NEVER TRUE

## 2024-04-11 SDOH — ECONOMIC STABILITY: HOUSING INSECURITY
IN THE LAST 12 MONTHS, WAS THERE A TIME WHEN YOU DID NOT HAVE A STEADY PLACE TO SLEEP OR SLEPT IN A SHELTER (INCLUDING NOW)?: NO

## 2024-04-11 SDOH — ECONOMIC STABILITY: INCOME INSECURITY: HOW HARD IS IT FOR YOU TO PAY FOR THE VERY BASICS LIKE FOOD, HOUSING, MEDICAL CARE, AND HEATING?: NOT VERY HARD

## 2024-04-11 SDOH — ECONOMIC STABILITY: TRANSPORTATION INSECURITY
IN THE PAST 12 MONTHS, HAS LACK OF TRANSPORTATION KEPT YOU FROM MEETINGS, WORK, OR FROM GETTING THINGS NEEDED FOR DAILY LIVING?: NO

## 2024-04-11 SDOH — ECONOMIC STABILITY: INCOME INSECURITY: IN THE LAST 12 MONTHS, WAS THERE A TIME WHEN YOU WERE NOT ABLE TO PAY THE MORTGAGE OR RENT ON TIME?: NO

## 2024-04-11 SDOH — ECONOMIC STABILITY: INCOME INSECURITY: IN THE PAST 12 MONTHS, HAS THE ELECTRIC, GAS, OIL, OR WATER COMPANY THREATENED TO SHUT OFF SERVICE IN YOUR HOME?: NO

## 2024-04-11 SDOH — ECONOMIC STABILITY: TRANSPORTATION INSECURITY
IN THE PAST 12 MONTHS, HAS THE LACK OF TRANSPORTATION KEPT YOU FROM MEDICAL APPOINTMENTS OR FROM GETTING MEDICATIONS?: NO

## 2024-04-11 SDOH — ECONOMIC STABILITY: FOOD INSECURITY: WITHIN THE PAST 12 MONTHS, YOU WORRIED THAT YOUR FOOD WOULD RUN OUT BEFORE YOU GOT MONEY TO BUY MORE.: NEVER TRUE

## 2024-04-11 SDOH — ECONOMIC STABILITY: HOUSING INSECURITY: IN THE LAST 12 MONTHS, HOW MANY PLACES HAVE YOU LIVED?: 1

## 2024-04-11 ASSESSMENT — PAIN - FUNCTIONAL ASSESSMENT
PAIN_FUNCTIONAL_ASSESSMENT: 0-10

## 2024-04-11 ASSESSMENT — PAIN SCALES - GENERAL
PAINLEVEL_OUTOF10: 7
PAINLEVEL_OUTOF10: 3
PAINLEVEL_OUTOF10: 8
PAINLEVEL_OUTOF10: 8
PAINLEVEL_OUTOF10: 2
PAINLEVEL_OUTOF10: 3
PAINLEVEL_OUTOF10: 0 - NO PAIN
PAINLEVEL_OUTOF10: 1
PAINLEVEL_OUTOF10: 8
PAINLEVEL_OUTOF10: 3
PAINLEVEL_OUTOF10: 0 - NO PAIN
PAINLEVEL_OUTOF10: 3

## 2024-04-11 ASSESSMENT — COGNITIVE AND FUNCTIONAL STATUS - GENERAL
CLIMB 3 TO 5 STEPS WITH RAILING: A LITTLE
TOILETING: A LITTLE
MOVING FROM LYING ON BACK TO SITTING ON SIDE OF FLAT BED WITH BEDRAILS: A LITTLE
MOBILITY SCORE: 18
DAILY ACTIVITIY SCORE: 19
STANDING UP FROM CHAIR USING ARMS: A LITTLE
HELP NEEDED FOR BATHING: A LITTLE
MOVING TO AND FROM BED TO CHAIR: A LITTLE
DRESSING REGULAR LOWER BODY CLOTHING: A LITTLE
TURNING FROM BACK TO SIDE WHILE IN FLAT BAD: A LITTLE
DRESSING REGULAR UPPER BODY CLOTHING: A LITTLE
WALKING IN HOSPITAL ROOM: A LITTLE
PERSONAL GROOMING: A LITTLE

## 2024-04-11 ASSESSMENT — ENCOUNTER SYMPTOMS
PSYCHIATRIC NEGATIVE: 1
DIZZINESS: 1
FATIGUE: 0
GASTROINTESTINAL NEGATIVE: 1
PALPITATIONS: 1
EYES NEGATIVE: 1
ACTIVITY CHANGE: 1
RESPIRATORY NEGATIVE: 1
MUSCULOSKELETAL NEGATIVE: 1
CHILLS: 0
SEIZURES: 1

## 2024-04-11 ASSESSMENT — PAIN DESCRIPTION - LOCATION: LOCATION: HEAD

## 2024-04-11 NOTE — H&P
History Of Present Illness  Erica Machado is a 58 y.o. female presenting with a complaint of seizures. She ran out of her seizure medications; she has not taken them for the past 3 days. She had 3 seizure episodes at home. She sees her neurologist every 6 months. She complained of dizziness and palpitations. She has not been taking her levothyroxine either. She denies SOB, fever, chills, N/V/D/C.    In the ED:  Labs: Glu 102; Na 120; K 3.1; BuN 10; Cr 0.99; Mg 1.41; WBC 14.2; Hb 12.8; Hcrt 36.0  Radiology: CT head negative for mass or hemorrhage, CT chest no acute issues.  Medications: lacosamide, IVF, versed, magnesium, potassium  Disposition: Admitted to ICU     Past Medical History  She has a past medical history of Anxiety, COPD (chronic obstructive pulmonary disease) (CMS/[facility]), Diverticulosis, GERD (gastroesophageal reflux disease), HLD (hyperlipidemia), Hypertension, Hypothyroidism, Insomnia, Migraine, Osteoarthritis, Other specified anxiety disorders (01/03/2022), Schizoaffective disorder (CMS/[facility]), and Seizure (CMS/[facility]).    Surgical History  She has a past surgical history that includes Foot surgery (10/13/2014); Endometrial ablation; and Foot surgery.     Social History  She reports that she has been smoking cigarettes. She has never been exposed to tobacco smoke. She has never used smokeless tobacco. She reports that she does not drink alcohol and does not use drugs.    Family History  Family History   Problem Relation Name Age of Onset    Diabetes Mother      Emphysema Mother      Emphysema Father          Allergies  Penicillins and Sulfa (sulfonamide antibiotics)    Review of Systems   Constitutional:  Positive for activity change. Negative for chills and fatigue.   HENT: Negative.     Eyes: Negative.    Respiratory: Negative.     Cardiovascular:  Positive for palpitations.   Gastrointestinal: Negative.    Genitourinary: Negative.    Musculoskeletal: Negative.    Skin: Negative.    Neurological:   Positive for dizziness and seizures.   Psychiatric/Behavioral: Negative.          Physical Exam  Vitals reviewed.   Constitutional:       Appearance: Normal appearance. She is normal weight.   HENT:      Head: Normocephalic and atraumatic.      Nose: Nose normal.   Eyes:      Conjunctiva/sclera: Conjunctivae normal.      Pupils: Pupils are equal, round, and reactive to light.   Cardiovascular:      Rate and Rhythm: Normal rate and regular rhythm.      Pulses: Normal pulses.      Heart sounds: Normal heart sounds.   Pulmonary:      Effort: Pulmonary effort is normal.      Breath sounds: Normal breath sounds.   Abdominal:      General: Bowel sounds are normal.      Palpations: Abdomen is soft.   Musculoskeletal:         General: Normal range of motion.      Cervical back: Normal range of motion and neck supple.   Skin:     General: Skin is warm.   Neurological:      General: No focal deficit present.      Mental Status: She is oriented to person, place, and time.   Psychiatric:         Mood and Affect: Mood normal.         Behavior: Behavior normal.          Last Recorded Vitals  /71 (BP Location: Right arm, Patient Position: Lying)   Pulse 75   Temp 35.9 °C (96.6 °F) (Temporal)   Resp 17   Wt 89 kg (196 lb 3.4 oz)   SpO2 98%     Relevant Results    Scheduled medications  atorvastatin, 80 mg, oral, Nightly  benztropine, 0.5 mg, oral, BID  busPIRone, 15 mg, oral, BID  cyanocobalamin, 100 mcg, oral, Daily  enoxaparin, 40 mg, subcutaneous, q24h  fluticasone furoate-vilanteroL, 1 puff, inhalation, Daily  gabapentin, 300 mg, oral, q8h DWAIN  lacosamide, 50 mg, oral, q12h DWAIN  lamoTRIgine, 75 mg, oral, Daily  levothyroxine, 150 mcg, oral, Daily  lisinopril, 40 mg, oral, Daily  melatonin, 6 mg, oral, Nightly  pantoprazole, 40 mg, oral, Daily before breakfast      Continuous medications  sodium chloride 0.9%, 10 mL/hr      PRN medications  PRN medications: acetaminophen, bisacodyl, ipratropium-albuteroL, magnesium  hydroxide, melatonin, ondansetron, polyethylene glycol, sodium chloride 0.9%    Results for orders placed or performed during the hospital encounter of 04/10/24 (from the past 24 hour(s))   CBC and Auto Differential   Result Value Ref Range    WBC 14.2 (H) 4.4 - 11.3 x10*3/uL    nRBC 0.0 0.0 - 0.0 /100 WBCs    RBC 4.00 4.00 - 5.20 x10*6/uL    Hemoglobin 12.8 12.0 - 16.0 g/dL    Hematocrit 36.0 36.0 - 46.0 %    MCV 90 80 - 100 fL    MCH 32.0 26.0 - 34.0 pg    MCHC 35.6 32.0 - 36.0 g/dL    RDW 13.2 11.5 - 14.5 %    Platelets 352 150 - 450 x10*3/uL    Neutrophils % 76.3 40.0 - 80.0 %    Immature Granulocytes %, Automated 0.5 0.0 - 0.9 %    Lymphocytes % 16.3 13.0 - 44.0 %    Monocytes % 5.5 2.0 - 10.0 %    Eosinophils % 0.8 0.0 - 6.0 %    Basophils % 0.6 0.0 - 2.0 %    Neutrophils Absolute 10.87 (H) 1.20 - 7.70 x10*3/uL    Immature Granulocytes Absolute, Automated 0.07 0.00 - 0.70 x10*3/uL    Lymphocytes Absolute 2.32 1.20 - 4.80 x10*3/uL    Monocytes Absolute 0.78 0.10 - 1.00 x10*3/uL    Eosinophils Absolute 0.11 0.00 - 0.70 x10*3/uL    Basophils Absolute 0.09 0.00 - 0.10 x10*3/uL   Magnesium   Result Value Ref Range    Magnesium 1.41 (L) 1.60 - 2.40 mg/dL   Comprehensive metabolic panel   Result Value Ref Range    Glucose 102 (H) 74 - 99 mg/dL    Sodium 120 (LL) 136 - 145 mmol/L    Potassium 3.1 (L) 3.5 - 5.3 mmol/L    Chloride 87 (L) 98 - 107 mmol/L    Bicarbonate 23 21 - 32 mmol/L    Anion Gap 13 10 - 20 mmol/L    Urea Nitrogen 10 6 - 23 mg/dL    Creatinine 0.99 0.50 - 1.05 mg/dL    eGFR 67 >60 mL/min/1.73m*2    Calcium 9.2 8.6 - 10.3 mg/dL    Albumin 4.4 3.4 - 5.0 g/dL    Alkaline Phosphatase 62 33 - 110 U/L    Total Protein 6.8 6.4 - 8.2 g/dL    AST 17 9 - 39 U/L    Bilirubin, Total 0.3 0.0 - 1.2 mg/dL    ALT 15 7 - 45 U/L   Troponin I, High Sensitivity   Result Value Ref Range    Troponin I, High Sensitivity 8 0 - 13 ng/L   Urinalysis with Reflex Culture and Microscopic   Result Value Ref Range    Color, Urine  Light-Yellow Light-Yellow, Yellow, Dark-Yellow    Appearance, Urine Clear Clear    Specific Gravity, Urine 1.006 1.005 - 1.035    pH, Urine 6.5 5.0, 5.5, 6.0, 6.5, 7.0, 7.5, 8.0    Protein, Urine NEGATIVE NEGATIVE, 10 (TRACE), 20 (TRACE) mg/dL    Glucose, Urine Normal Normal mg/dL    Blood, Urine NEGATIVE NEGATIVE    Ketones, Urine NEGATIVE NEGATIVE mg/dL    Bilirubin, Urine NEGATIVE NEGATIVE    Urobilinogen, Urine Normal Normal mg/dL    Nitrite, Urine NEGATIVE NEGATIVE    Leukocyte Esterase, Urine NEGATIVE NEGATIVE   Extra Urine Gray Tube   Result Value Ref Range    Extra Tube Hold for add-ons.    CBC   Result Value Ref Range    WBC 10.3 4.4 - 11.3 x10*3/uL    nRBC 0.0 0.0 - 0.0 /100 WBCs    RBC 3.99 (L) 4.00 - 5.20 x10*6/uL    Hemoglobin 12.7 12.0 - 16.0 g/dL    Hematocrit 36.8 36.0 - 46.0 %    MCV 92 80 - 100 fL    MCH 31.8 26.0 - 34.0 pg    MCHC 34.5 32.0 - 36.0 g/dL    RDW 13.4 11.5 - 14.5 %    Platelets 315 150 - 450 x10*3/uL   Magnesium   Result Value Ref Range    Magnesium 2.43 (H) 1.60 - 2.40 mg/dL   Phosphorus   Result Value Ref Range    Phosphorus 2.3 (L) 2.5 - 4.9 mg/dL   Comprehensive Metabolic Panel   Result Value Ref Range    Glucose 108 (H) 74 - 99 mg/dL    Sodium 123 (L) 136 - 145 mmol/L    Potassium 4.0 3.5 - 5.3 mmol/L    Chloride 96 (L) 98 - 107 mmol/L    Bicarbonate 16 (L) 21 - 32 mmol/L    Anion Gap 15 10 - 20 mmol/L    Urea Nitrogen 8 6 - 23 mg/dL    Creatinine 0.94 0.50 - 1.05 mg/dL    eGFR 71 >60 mL/min/1.73m*2    Calcium 9.1 8.6 - 10.3 mg/dL    Albumin 4.4 3.4 - 5.0 g/dL    Alkaline Phosphatase 55 33 - 110 U/L    Total Protein 6.7 6.4 - 8.2 g/dL    AST 21 9 - 39 U/L    Bilirubin, Total 0.4 0.0 - 1.2 mg/dL    ALT 14 7 - 45 U/L   TSH   Result Value Ref Range    Thyroid Stimulating Hormone 117.00 (H) 0.44 - 3.98 mIU/L   T4, free   Result Value Ref Range    Thyroxine, Free <0.25 (L) 0.61 - 1.12 ng/dL             Assessment/Plan   Principal Problem:    Seizure (CMS/HCC)    Seizure Disorder d/t  non-compliance to medication  - given lacosamide, midazolam in the ED  - resumed lacosamide  - resumed lamotrigine  - seizure precautions    Symptomatic Bradycardia  CAD  HLD  - Cardiac monitoring via ICU  - cardiology consult  - continue atorvastatin, lisinopril,  - monitor BP and HR    Hypothyroidism  Noncompliance with medication  - ; Free T4 <0.25  - resumed Levothyoxine    Hypokalemia  - K 3.1 on admission; today K 4.0  - replaced with KCL  - monitor BMP    Hypomagnesia  - Mg 1.41 on admission; today Mg 2.43  - replaced with IV magnesium sulfate  - monitor Mg    Hyponatremia  - Na120 on admission; today Na 123  - given IVF in the ED  - regular diet with 1800 mL fluid restriction    COPD not in exacerbation  - continue Breo  - continue duoneb, prn    Schizoaffective disorder  Generalized Anxiety Disorder  - continue buspirone, benztropine  - hold trazodone, vilazodone, remeron    B12 Deficiency  - continue cyanocobalamin    PUD ppx  - continue pantoprazole    DVT ppx  - started enoxaparin    Code Status: Full    Disposition: Patient requires more than 2 inpatient days.    Total accumulated time spent face to face and not face to face preparing to see the patient, obtaining and reviewing separately obtained history; performing a medically appropriate examination and/or evaluation; counseling and educating the patient, family; ordering medications, tests, or procedures; referring and communicating with other health care professionals; documenting clinical information in the patient's medical record; independently interpreting results and communicating the results to the patient, family; and care coordination was 60 minutes.          Bonnie Segovia, APRN-CNP

## 2024-04-11 NOTE — CARE PLAN
The patient's goals for the shift include to have no seizure activity    The clinical goals for the shift include HR will remain > 60 this shift.    No bradycardia or seizure activity this shift. Seizure meds that pt ran out of at home restarted. Pt also reported she had not been taking her synthroid and that was restarted. Downgraded to telemetry this afternoon, possible discharge home tomorrow.

## 2024-04-11 NOTE — DISCHARGE INSTR - OTHER ORDERS
Thank you for choosing [facility] for your Health Care needs.  Also, thank you for allowing us to take you and your families preferences into account when determining your discharge plan.  Stay well!    Your Care Transitions Team Member: Genny Caba [phone]     For questions about medications listed on your discharge instructions, please call the Nurses Station at [phone].     [facility] is a free community service that provides information about social health and government resources 24 hours a day.  It provides Human services agencies, food and shelter providers,  resources, special services for senior and volunteer opportunities.  [email or web address]

## 2024-04-11 NOTE — CARE PLAN
The patient's goals for the shift include to have no seizure activity    The clinical goals for the shift include monitor HR to stay >60, safety, and monitor migraine      Problem: Skin  Goal: Decreased wound size/increased tissue granulation at next dressing change  Outcome: Progressing  Goal: Participates in plan/prevention/treatment measures  Outcome: Progressing  Goal: Prevent/manage excess moisture  Outcome: Progressing  Goal: Prevent/minimize sheer/friction injuries  Outcome: Progressing  Goal: Promote/optimize nutrition  Outcome: Progressing  Goal: Promote skin healing  Outcome: Progressing     Problem: Seizure  Goal: I will remain free from seizures  Outcome: Progressing     Problem: Pain - Adult  Goal: Verbalizes/displays adequate comfort level or baseline comfort level  Outcome: Progressing     Problem: Safety - Adult  Goal: Free from fall injury  Outcome: Progressing     Problem: Discharge Planning  Goal: Discharge to home or other facility with appropriate resources  Outcome: Progressing     Problem: Chronic Conditions and Co-morbidities  Goal: Patient's chronic conditions and co-morbidity symptoms are monitored and maintained or improved  Outcome: Progressing     Problem: Fall/Injury  Goal: Not fall by end of shift  Outcome: Progressing  Goal: Be free from injury by end of the shift  Outcome: Progressing  Goal: Verbalize understanding of personal risk factors for fall in the hospital  Outcome: Progressing  Goal: Verbalize understanding of risk factor reduction measures to prevent injury from fall in the home  Outcome: Progressing  Goal: Use assistive devices by end of the shift  Outcome: Progressing  Goal: Pace activities to prevent fatigue by end of the shift  Outcome: Progressing     Problem: Pain  Goal: Takes deep breaths with improved pain control throughout the shift  Outcome: Progressing  Goal: Turns in bed with improved pain control throughout the shift  Outcome: Progressing  Goal: Walks with  improved pain control throughout the shift  Outcome: Progressing  Goal: Performs ADL's with improved pain control throughout shift  Outcome: Progressing  Goal: Participates in PT with improved pain control throughout the shift  Outcome: Progressing  Goal: Free from opioid side effects throughout the shift  Outcome: Progressing  Goal: Free from acute confusion related to pain meds throughout the shift  Outcome: Progressing

## 2024-04-11 NOTE — CONSULTS
"Reason For Consult  Bradycardia    History Of Present Illness  Erica Machado is a 58 y.o. female presenting with seizures from home. Had 3 seizures per family.  Admits to missing her meds (vimpat) for last few days as she ran out.  Alart and oriented in ED. However noted to be bradycardic to 40s.   She denies chest pain, sob. Admits to mild lighheadedness and headache. No head trauma. No prior syncopal episodes.  Labs notably with low Mg and K levels  EKG in ICU shows 1st degree AV block with Qtc 574     Past Medical History  She has a past medical history of Other specified anxiety disorders (01/03/2022) and Seizure (CMS/[facility]).    Surgical History  She has a past surgical history that includes Foot surgery (10/13/2014).     Social History  She reports that she has never smoked. She has never been exposed to tobacco smoke. She has never used smokeless tobacco. She reports that she does not drink alcohol and does not use drugs.    Family History  No family history on file.     Allergies  Penicillins and Sulfa (sulfonamide antibiotics)    Review of Systems  Negative except as in HPI above      Physical Exam  Gen: Not in overt distress  Chest: non-labored breathing  CVS: s1 s2, warm ext  CNS: Alert, oriented     Last Recorded Vitals  Blood pressure 113/70, pulse (!) 47, temperature 36 °C (96.8 °F), temperature source Temporal, resp. rate 11, height 1.753 m (5' 9\"), weight 87.1 kg (192 lb), SpO2 100%.    Relevant Results  Results for orders placed or performed during the hospital encounter of 04/10/24 (from the past 24 hour(s))   CBC and Auto Differential   Result Value Ref Range    WBC 14.2 (H) 4.4 - 11.3 x10*3/uL    nRBC 0.0 0.0 - 0.0 /100 WBCs    RBC 4.00 4.00 - 5.20 x10*6/uL    Hemoglobin 12.8 12.0 - 16.0 g/dL    Hematocrit 36.0 36.0 - 46.0 %    MCV 90 80 - 100 fL    MCH 32.0 26.0 - 34.0 pg    MCHC 35.6 32.0 - 36.0 g/dL    RDW 13.2 11.5 - 14.5 %    Platelets 352 150 - 450 x10*3/uL    Neutrophils % 76.3 40.0 - " 80.0 %    Immature Granulocytes %, Automated 0.5 0.0 - 0.9 %    Lymphocytes % 16.3 13.0 - 44.0 %    Monocytes % 5.5 2.0 - 10.0 %    Eosinophils % 0.8 0.0 - 6.0 %    Basophils % 0.6 0.0 - 2.0 %    Neutrophils Absolute 10.87 (H) 1.20 - 7.70 x10*3/uL    Immature Granulocytes Absolute, Automated 0.07 0.00 - 0.70 x10*3/uL    Lymphocytes Absolute 2.32 1.20 - 4.80 x10*3/uL    Monocytes Absolute 0.78 0.10 - 1.00 x10*3/uL    Eosinophils Absolute 0.11 0.00 - 0.70 x10*3/uL    Basophils Absolute 0.09 0.00 - 0.10 x10*3/uL   Magnesium   Result Value Ref Range    Magnesium 1.41 (L) 1.60 - 2.40 mg/dL   Comprehensive metabolic panel   Result Value Ref Range    Glucose 102 (H) 74 - 99 mg/dL    Sodium 120 (LL) 136 - 145 mmol/L    Potassium 3.1 (L) 3.5 - 5.3 mmol/L    Chloride 87 (L) 98 - 107 mmol/L    Bicarbonate 23 21 - 32 mmol/L    Anion Gap 13 10 - 20 mmol/L    Urea Nitrogen 10 6 - 23 mg/dL    Creatinine 0.99 0.50 - 1.05 mg/dL    eGFR 67 >60 mL/min/1.73m*2    Calcium 9.2 8.6 - 10.3 mg/dL    Albumin 4.4 3.4 - 5.0 g/dL    Alkaline Phosphatase 62 33 - 110 U/L    Total Protein 6.8 6.4 - 8.2 g/dL    AST 17 9 - 39 U/L    Bilirubin, Total 0.3 0.0 - 1.2 mg/dL    ALT 15 7 - 45 U/L   Troponin I, High Sensitivity   Result Value Ref Range    Troponin I, High Sensitivity 8 0 - 13 ng/L   Urinalysis with Reflex Culture and Microscopic   Result Value Ref Range    Color, Urine Light-Yellow Light-Yellow, Yellow, Dark-Yellow    Appearance, Urine Clear Clear    Specific Gravity, Urine 1.006 1.005 - 1.035    pH, Urine 6.5 5.0, 5.5, 6.0, 6.5, 7.0, 7.5, 8.0    Protein, Urine NEGATIVE NEGATIVE, 10 (TRACE), 20 (TRACE) mg/dL    Glucose, Urine Normal Normal mg/dL    Blood, Urine NEGATIVE NEGATIVE    Ketones, Urine NEGATIVE NEGATIVE mg/dL    Bilirubin, Urine NEGATIVE NEGATIVE    Urobilinogen, Urine Normal Normal mg/dL    Nitrite, Urine NEGATIVE NEGATIVE    Leukocyte Esterase, Urine NEGATIVE NEGATIVE          Assessment/Plan     IMPRESSION  # Breakthrough  Seizures due to non-compliance  # Bradycardia  # Prolonged Qtc  # Hypokalemia  # Hypomagnesemia    PLAN  - icu care  - Antiepileptic drugs; neurochecks  - close hemodynamic monitoring  - Replace electrolytes >> Will keep Mg >2.0, K >4.0  - Echo  - Cardiology consult  - Hold home QT prolonging meds  - Will actively target with vasopressors , if needed, to keep MAP > 65.  - Appropriately maintain saturations above 92%.  - Continue to monitor UOP carefully to ensure at least 0.5ml/kg/h.  - Maintain appropriate sleep/wake cycles to avoid ICU delirium.   -Prophylaxis      The entirety of this visit history, physical exam, and diagnostic interpretation was done via telemedicine.   Patient is located in [place] and I am located in [place].   Upon my own and separate evaluation, this patient had a high probability of imminent of life-threatening deterioration due to bradycardia with prolonged Qtc . This required my direct attention, intervention and personal  management.     I have personally provided 50 minutes of critical care time exclusive of time spent on separately billable procedures. Time includes review of laboratory data, radiology results, discussion with clinical staff, and monitoring of potential decompensation. Interventions were performed as documented above.      Justin Pickens MD

## 2024-04-11 NOTE — CONSULTS
Cardiology Consult Note  Patient: Erica Machado  Unit/Bed: 04/04-A  YOB: 1965    Admitting Diagnosis: Hypokalemia [E87.6]  Hypomagnesemia [E83.42]  Hyponatremia [E87.1]  Seizure (CMS/HCC) [R56.9]  Date:  4/10/2024  Hospital Day: 1  Attending: Hafsa Mcpherson MD      Chief Complaint   Patient presents with    Seizures     Patient is alert  at this time. Patient states she is out of seizure medication has been for 3 days        SUBJECTIVE:     History of Present Illness:  58 YOF presented via [facility] ED with c/o seizure. She had been out of seizure and thyroid prescriptions for 3 days prior. She was bradycardiac on presentation, asymptomatic.   She denies prior cardiac history or invasive testing. Denies prior hospitalizations for chest pain    Past medical history significant for HTN, HTG, seizure disorder,  hypothyroid on replacement,  COPD, OA, schizophrenia and anxiety   Primary Cardiology outpatient: NON       Allergies:  Allergies   Allergen Reactions    Penicillins     Sulfa (Sulfonamide Antibiotics)       Home Medication:  No medications prior to admission.      PMHx:  Past Medical History:   Diagnosis Date    Anxiety     COPD (chronic obstructive pulmonary disease) (CMS/HCC)     Diverticulosis     GERD (gastroesophageal reflux disease)     HLD (hyperlipidemia)     Hypertension     Hypothyroidism     Insomnia     Migraine     Osteoarthritis     Other specified anxiety disorders 01/03/2022    Depression with anxiety    Schizoaffective disorder (CMS/HCC)     Seizure (CMS/HCC)        PSHx:  Past Surgical History:   Procedure Laterality Date    ENDOMETRIAL ABLATION      FOOT SURGERY  10/13/2014    Foot Surgery    FOOT SURGERY         Social Hx:  Social History     Socioeconomic History    Marital status:      Spouse name: None    Number of children: None    Years of education: None    Highest education level: None   Occupational History    None   Tobacco Use    Smoking  status: Every Day     Current packs/day: 0.50     Types: Cigarettes     Passive exposure: Never    Smokeless tobacco: Never   Vaping Use    Vaping status: Never Used   Substance and Sexual Activity    Alcohol use: Never    Drug use: Never    Sexual activity: Not Currently   Other Topics Concern    None   Social History Narrative    None     Social Determinants of Health     Financial Resource Strain: Low Risk  (4/10/2024)    Overall Financial Resource Strain (CARDIA)     Difficulty of Paying Living Expenses: Not hard at all   Food Insecurity: Declined (4/14/2022)    Received from [facility]     Food Insecurity     Food: 99   Transportation Needs: No Transportation Needs (4/10/2024)    PRAPARE - Transportation     Lack of Transportation (Medical): No     Lack of Transportation (Non-Medical): No   Physical Activity: Not on File (5/20/2021)    Received from [facility]     Physical Activity     Physical Activity: 0   Stress: At Risk (4/14/2022)    Received from [facility]     Stress     Stress: 2   Social Connections: Not at Risk (4/14/2022)    Received from [facility]     Social Connections     Social Connections and Isolation: 1   Intimate Partner Violence: Not on file   Housing Stability: Low Risk  (4/10/2024)    Housing Stability Vital Sign     Unable to Pay for Housing in the Last Year: No     Number of Places Lived in the Last Year: 1     Unstable Housing in the Last Year: No       Family Hx:  Family History   Problem Relation Name Age of Onset    Diabetes Mother      Emphysema Mother      Emphysema Father         Review of Systems:   12 system review of symptoms is negative except as noted above          OBJECTIVE  Vitals:   Visit Vitals  /72 (BP Location: Right arm, Patient Position: Lying)   Pulse 62   Temp 35.9 °C (96.6 °F) (Temporal)   Resp 17        Wt Readings from Last 5 Encounters:   04/10/24 89 kg (196 lb 3.4 oz)   06/29/21 82.7 kg (182 lb 5.1 oz)   06/16/21 80.5 kg (177 lb 8 oz)   06/11/21 82.6 kg (182 lb)   10/27/18  (!) 33.5 kg (73 lb 13.7 oz)       Intake / Output:   I/O last 3 completed shifts:  In: 1580 (17.8 mL/kg) [P.O.:480; I.V.:100 (1.1 mL/kg); IV Piggyback:1000]  Out: - (0 mL/kg)   Weight: 89 kg      Tele monitoring: SR 68    Physical Examination:    Constitutional:       Appearance: Healthy appearance. Not in distress.   Eyes:      Conjunctiva/sclera: Conjunctivae normal.   Neck:      Vascular: JVD normal.   Pulmonary:      Effort: Pulmonary effort is normal.      Breath sounds: Normal breath sounds.   Cardiovascular:      PMI at left midclavicular line. Normal rate. Regular rhythm. Normal S1. Normal S2.       Murmurs: There is no murmur.      No rub.   Pulses:     Intact distal pulses.   Edema:     Peripheral edema absent.   Abdominal:      General: Bowel sounds are normal.   Musculoskeletal:      Cervical back: Neck supple. Skin:     General: Skin is warm and dry.   Neurological:      Mental Status: Alert and oriented to person, place and time.            LABS:  CMP:   Recent Labs     04/11/24  0524 04/10/24  1714 09/19/23  0511 09/18/23  0513 09/17/23  0911 09/16/23  1348 09/16/23  1210 08/20/22  1648 07/04/22  1705 05/26/22  0647 11/16/20  2331 11/16/20  1600 09/11/20  1300 01/17/20  1154 08/15/19  1027 10/26/18  1503 10/10/18  1349 07/12/18  1433 05/31/18  1003   * 120* 135* 127* 129* CANCELED 126* 134* 138 136   < > 138   < > 137 140   < > 131*   < > 142   K 4.0 3.1* 4.2 4.0 3.4* CANCELED 3.4* 3.8 3.1* 4.5   < > 3.7   < > 3.9 4.7   < > 3.2*   < > 4.2   CL 96* 87* 105 96* 93* CANCELED 87* 105 106 105   < > 106   < > 98 102   < > 100   < > 101   CO2 16* 23 21 24 24 CANCELED 24 19* 20* 20*   < > 23   < > 22* 23*   < > 19*   < > 26   ANIONGAP 15 13 13 11 15 CANCELED 18 14 15 16   < > 13   < > 17 15   < > 12   < > 15   BUN 8 10 10 7 4* CANCELED 6 10 14 23   < > 8   < > 11 17   < > 14   < > 8   CREATININE 0.94 0.99 0.70 1.62* 0.90 CANCELED 0.75 0.84 0.96 1.06*   < > 0.93   < > 1.0 0.9   < > 0.7   < > 0.9   EGFR  "71 67  --   --   --   --   --   --   --   --   --   --   --  62 70  --  94  --  70   MG 2.43* 1.41* 1.89 1.77  --   --   --   --   --   --   --  2.01  --   --   --   --   --   --   --     < > = values in this interval not displayed.     LIVER ENZYMES:   Recent Labs     04/11/24  0524 04/10/24  1714 09/16/23  1210 08/20/22  1648 07/04/22  1705 01/10/22  1108 01/03/22  1052 12/12/20  1041 10/26/18  1503 10/10/18  1349 08/07/18  1838 07/13/18  0920 07/12/18  1433   ALBUMIN 4.4 4.4 5.5* 4.2 4.3 5.2* 5.2* 4.5   < > 4.6   < > 4.5 4.7   ALT 14 15 43 13 18 14 10 19   < > 16   < > 21 22   AST 21 17 22 10 17 11 9 22   < > 11   < > 16 17   BILITOT 0.4 0.3 0.4 0.2 0.2 0.3 0.4 0.4   < > 0.2   < > 0.3 0.4   LIPASE  --   --  9  --   --   --   --   --   --  24  --  28 23    < > = values in this interval not displayed.     CBC:  Recent Labs     04/11/24  0524 04/10/24  1714 09/19/23  0511 09/17/23  0911 09/16/23  1210 08/20/22  1648 07/04/22  1705 05/24/22  1626   WBC 10.3 14.2* 5.8 8.8 15.8* 11.1 7.1 8.3   HGB 12.7 12.8 12.5 13.7 15.1 13.5 12.1 13.7   HCT 36.8 36.0 38.6 40.5 42.1 40.1 35.6* 40.4    352 281 347 427 250 244 274   MCV 92 90 97 94 88 92 94 93     COAG:   Recent Labs     10/26/18  1503 07/13/18  0920   INR 1.0 1.0     ABO: No results for input(s): \"ABO\" in the last 15040 hours.  HEME/ENDO:  Recent Labs     02/07/24  1117 12/14/23  1435 09/18/23  0513 06/14/22  1518 05/26/22  0647 10/04/18  1936 06/12/18  1031   IRONSAT  --   --   --   --   --   --  31.4   TSH  --   --  43.42*  --  56.00*   < >  --    HGBA1C 5.5 5.3  --    < > 6.2*   < >  --     < > = values in this interval not displayed.      CARDIAC:   Recent Labs     04/10/24  1714 09/16/23  1210 08/20/22  1648   TROPHS 8 6 7       Lipid Panel:  No results found for: \"HDL\", \"CHHDL\", \"VLDL\", \"TRIG\", \"NHDL\"       Current Medications:   MEDICATIONS  Infusions:  sodium chloride 0.9%      Scheduled:  atorvastatin, 80 mg, Nightly  benztropine, 0.5 mg, " BID  busPIRone, 15 mg, BID  cyanocobalamin, 100 mcg, Daily  enoxaparin, 40 mg, q24h  fluticasone furoate-vilanteroL, 1 puff, Daily  gabapentin, 300 mg, q8h DWAIN  lacosamide, 50 mg, q12h DWAIN  lamoTRIgine, 75 mg, Daily  levothyroxine, 150 mcg, Daily  lisinopril, 40 mg, Daily  melatonin, 6 mg, Nightly  pantoprazole, 40 mg, Daily before breakfast      PRN:  acetaminophen, 650 mg, q4h PRN  bisacodyl, 10 mg, Daily PRN  ipratropium-albuteroL, 3 mL, q6h PRN  magnesium hydroxide, 10 mL, Daily PRN  melatonin, 3 mg, Nightly PRN  ondansetron, 4 mg, q8h PRN  polyethylene glycol, 17 g, Daily PRN  sodium chloride 0.9%, 10 mL/hr, Continuous PRN          LAST IMAGING RESULTS   CT chest wo IV contrast  Narrative: Interpreted By:  Alvin Rinaldi,   STUDY:  CT CHEST WO IV CONTRAST;  4/10/2024 5:45 pm      INDICATION:  Signs/Symptoms:seizure.      COMPARISON:  None.      ACCESSION NUMBER(S):  [id number]      ORDERING CLINICIAN:  STEPHANIE ESPARZA      TECHNIQUE:  Axial CT images of the chest obtained  without contrast. Coronal  sagittal reformats were obtained..      FINDINGS:  VESSELS: Aorta and pulmonary artery are normal caliber.  Atherosclerotic calcifications in the aorta and arch vessels. Lack of  contrast limits evaluation of the vasculature.      HEART: Normal size. No pericardial effusion. Mild coronary artery  calcifications noted.      MEDIASTINUM AND FLORA: No axillary or mediastinal adenopathy. Lack of  contrast limits evaluation of the flora.      LUNG, PLEURA, AND LARGE AIRWAYS: Bibasilar atelectasis. No  consolidation, effusion or pneumothorax.      CHEST WALL AND LOWER NECK: Within normal limits.      UPPER ABDOMEN: No acute abnormality of the partially visualized  abdomen.      BONES: Remote fracture deformity of the manubrium.      Impression: No acute cardiopulmonary process.      Additional findings as noted above.      MACRO:  None      Signed by: Alvin Rinaldi 4/10/2024 6:11 PM  Dictation workstation:   [id number]  CT  head wo IV contrast  Narrative: Interpreted By:  Alvin Rinaldi,   STUDY:  CT HEAD WO IV CONTRAST;  4/10/2024 5:45 pm      INDICATION:  Signs/Symptoms:seizure.      COMPARISON:  None.      ACCESSION NUMBER(S):  [id number]      ORDERING CLINICIAN:  STEPHANIE ESPARZA      TECHNIQUE:  Axial noncontrast CT images of the head.      FINDINGS:  BRAIN PARENCHYMA: Gray-white matter interfaces are preserved. No  mass, mass effect or midline shift. . deep and periventricular white  matter hypodensities are nonspecific, but favored to represent  chronic small vessel ischemic changes.      HEMORRHAGE: No acute intracranial hemorrhage.  VENTRICLES and EXTRA-AXIAL SPACES: Age-appropriate volume loss.  EXTRACRANIAL SOFT TISSUES: Within normal limits.  PARANASAL SINUSES/MASTOIDS: The visualized paranasal sinuses and  mastoid air cells are aerated. CALVARIUM: No depressed skull  fracture. No destructive osseous lesion.      OTHER FINDINGS: Atherosclerotic calcification of the carotid siphons  and vertebral arteries. 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      Impression: No acute intracranial abnormality.      Chronic changes as noted above.      MACRO:  None      Signed by: Alvin Rinaldi 4/10/2024 6:06 PM  Dictation workstation:   [id number]           EKG dated 4/10/2024 independently reviewed   SB 55       Cardiovascular studies:    NONE             Assessment:   58 YOF with asymptomatic sinus bradycardia after seizure in the setting of uncontrolled hypothyroidism;  , free T4 < 0.25  HTN, controlled   Non-compliance with prescription regimen   Hyponatremia on free fluid restriction         Plan:  Sinus bradycardia was asymptomatic and resolved   No further cardiac testing is needed   Will sign off                 Thank you  for the consult ; will sign off   Please call or message with questions or concerns   The case was discussed with SHARLENE Gale            Electronically signed by Roxanne Dillard  APRN-CNP on 4/11/2024 at 8:42 AM

## 2024-04-11 NOTE — NURSING NOTE
Spoke with Dr. Pickens about pt new admit, reviewed pt HR and EKG with him. Dr. Pickens ordered cardiology consult and will review pt chart for more orders.

## 2024-04-11 NOTE — PROGRESS NOTES
04/11/24 1212   Discharge Planning   Living Arrangements Family members  (sister Virginia)   Support Systems Family members;Children   Assistance Needed None: Independent  (DME:  Shower chair; grab bar; nebulizer)   Type of Residence Private residence  (2 story house with basement)   Number of Stairs to Enter Residence 4   Number of Stairs Within Residence 12   Do you have animals or pets at home? Yes   Type of Animals or Pets dog   Home or Post Acute Services None   Patient expects to be discharged to: Home   Does the patient need discharge transport arranged? No  (sister to pick her up)     TCC spoke with patient regarding discharge planning and home going needs. Patient alert and oriented x 3 and admitted for seizure.  Patient lives  with her sister Virginia in a 2 story house with basement.  Her bedroom is upstairs but she will be moving to the main floor 2/2 seizures.  She said there is a bathroom upstairs and on main floor.  Patient says she doesn't drive but her sister does.  Confirmed with patient PCP is  Michael Gayle and she saw him about 2 weeks ago.  Discharge Plan is for patient to return home with her sister.  TCC will continue to follow for changes in discharge planning needs.

## 2024-04-11 NOTE — NURSING NOTE
"0750: A& O x4, lying in bed. Reports she ran out of her seizure meds at home. Also reports she has not been taking her synthroid for the past month.    0824: Dr Blackburn & ALYSON Segovia CNP at bedside, ot seen.    0922: JORY Dillard CNP at bedside to see pt.    1223: Imitrex given for c/o \"migraine\".    1426: No relief from Imitrex. Sister brought in pt's home Ubrelvy which Juliette STEWART ordered a 1x dose after pharmacy verification of med. Ubrelvy given.     1540: Pt reports migraine is \"going away\".    1600: Large emesis of undigested food. Pt reported nausea after lunch and was given Zofran @ 1331.     1604: Downgraded to med/surg telemetry.    1644: Reports she is feeling less nauseated since vomiting.   "

## 2024-04-12 ENCOUNTER — APPOINTMENT (OUTPATIENT)
Dept: RADIOLOGY | Facility: HOSPITAL | Age: 59
End: 2024-04-12
Payer: MEDICAID

## 2024-04-12 LAB
ALBUMIN SERPL BCP-MCNC: 4.9 G/DL (ref 3.4–5)
ALP SERPL-CCNC: 60 U/L (ref 33–110)
ALT SERPL W P-5'-P-CCNC: 17 U/L (ref 7–45)
ANION GAP SERPL CALC-SCNC: 19 MMOL/L (ref 10–20)
AST SERPL W P-5'-P-CCNC: 24 U/L (ref 9–39)
BILIRUB SERPL-MCNC: 0.5 MG/DL (ref 0–1.2)
BUN SERPL-MCNC: 6 MG/DL (ref 6–23)
CALCIUM SERPL-MCNC: 9.7 MG/DL (ref 8.6–10.3)
CHLORIDE SERPL-SCNC: 98 MMOL/L (ref 98–107)
CO2 SERPL-SCNC: 17 MMOL/L (ref 21–32)
CREAT SERPL-MCNC: 1.06 MG/DL (ref 0.5–1.05)
EGFRCR SERPLBLD CKD-EPI 2021: 62 ML/MIN/1.73M*2
GLUCOSE SERPL-MCNC: 124 MG/DL (ref 74–99)
HOLD SPECIMEN: NORMAL
MAGNESIUM SERPL-MCNC: 1.93 MG/DL (ref 1.6–2.4)
POTASSIUM SERPL-SCNC: 3.3 MMOL/L (ref 3.5–5.3)
PROT SERPL-MCNC: 7.4 G/DL (ref 6.4–8.2)
SODIUM SERPL-SCNC: 131 MMOL/L (ref 136–145)

## 2024-04-12 PROCEDURE — 71101 X-RAY EXAM UNILAT RIBS/CHEST: CPT | Mod: LT,IPSPLIT

## 2024-04-12 PROCEDURE — 83735 ASSAY OF MAGNESIUM: CPT | Mod: IPSPLIT

## 2024-04-12 PROCEDURE — 80053 COMPREHEN METABOLIC PANEL: CPT | Mod: IPSPLIT

## 2024-04-12 PROCEDURE — 2500000002 HC RX 250 W HCPCS SELF ADMINISTERED DRUGS (ALT 637 FOR MEDICARE OP, ALT 636 FOR OP/ED): Mod: IPSPLIT | Performed by: NURSE PRACTITIONER

## 2024-04-12 PROCEDURE — 94668 MNPJ CHEST WALL SBSQ: CPT | Mod: IPSPLIT

## 2024-04-12 PROCEDURE — 2500000001 HC RX 250 WO HCPCS SELF ADMINISTERED DRUGS (ALT 637 FOR MEDICARE OP): Mod: IPSPLIT | Performed by: NURSE PRACTITIONER

## 2024-04-12 PROCEDURE — 2500000001 HC RX 250 WO HCPCS SELF ADMINISTERED DRUGS (ALT 637 FOR MEDICARE OP): Mod: IPSPLIT | Performed by: INTERNAL MEDICINE

## 2024-04-12 PROCEDURE — 94640 AIRWAY INHALATION TREATMENT: CPT

## 2024-04-12 PROCEDURE — 2500000004 HC RX 250 GENERAL PHARMACY W/ HCPCS (ALT 636 FOR OP/ED): Mod: IPSPLIT

## 2024-04-12 PROCEDURE — 74018 RADEX ABDOMEN 1 VIEW: CPT

## 2024-04-12 PROCEDURE — 2500000005 HC RX 250 GENERAL PHARMACY W/O HCPCS: Mod: IPSPLIT | Performed by: NURSE PRACTITIONER

## 2024-04-12 PROCEDURE — 74018 RADEX ABDOMEN 1 VIEW: CPT | Mod: IPSPLIT

## 2024-04-12 PROCEDURE — 2500000004 HC RX 250 GENERAL PHARMACY W/ HCPCS (ALT 636 FOR OP/ED): Mod: IPSPLIT | Performed by: NURSE PRACTITIONER

## 2024-04-12 PROCEDURE — 36415 COLL VENOUS BLD VENIPUNCTURE: CPT | Mod: IPSPLIT

## 2024-04-12 PROCEDURE — 99232 SBSQ HOSP IP/OBS MODERATE 35: CPT | Performed by: NURSE PRACTITIONER

## 2024-04-12 PROCEDURE — 71101 X-RAY EXAM UNILAT RIBS/CHEST: CPT | Mod: LEFT SIDE

## 2024-04-12 PROCEDURE — 1200000002 HC GENERAL ROOM WITH TELEMETRY DAILY: Mod: IPSPLIT

## 2024-04-12 RX ORDER — SUMATRIPTAN SUCCINATE 25 MG/1
100 TABLET ORAL ONCE
Status: COMPLETED | OUTPATIENT
Start: 2024-04-12 | End: 2024-04-12

## 2024-04-12 RX ORDER — PROCHLORPERAZINE EDISYLATE 5 MG/ML
10 INJECTION INTRAMUSCULAR; INTRAVENOUS EVERY 6 HOURS PRN
Status: DISCONTINUED | OUTPATIENT
Start: 2024-04-12 | End: 2024-04-13 | Stop reason: HOSPADM

## 2024-04-12 RX ORDER — ONDANSETRON HYDROCHLORIDE 2 MG/ML
8 INJECTION, SOLUTION INTRAVENOUS ONCE
Status: COMPLETED | OUTPATIENT
Start: 2024-04-12 | End: 2024-04-12

## 2024-04-12 RX ORDER — PROPRANOLOL HYDROCHLORIDE 40 MG/1
40 TABLET ORAL ONCE
Status: COMPLETED | OUTPATIENT
Start: 2024-04-12 | End: 2024-04-12

## 2024-04-12 RX ORDER — POTASSIUM CHLORIDE 20 MEQ/1
40 TABLET, EXTENDED RELEASE ORAL ONCE
Status: COMPLETED | OUTPATIENT
Start: 2024-04-12 | End: 2024-04-12

## 2024-04-12 RX ORDER — LACTULOSE 10 G/15ML
20 SOLUTION ORAL ONCE
Status: COMPLETED | OUTPATIENT
Start: 2024-04-12 | End: 2024-04-12

## 2024-04-12 RX ORDER — PROCHLORPERAZINE MALEATE 10 MG
10 TABLET ORAL EVERY 6 HOURS PRN
Status: DISCONTINUED | OUTPATIENT
Start: 2024-04-12 | End: 2024-04-13 | Stop reason: HOSPADM

## 2024-04-12 RX ORDER — LIDOCAINE 560 MG/1
1 PATCH PERCUTANEOUS; TOPICAL; TRANSDERMAL DAILY
Status: DISCONTINUED | OUTPATIENT
Start: 2024-04-12 | End: 2024-04-13 | Stop reason: HOSPADM

## 2024-04-12 RX ORDER — PROCHLORPERAZINE 25 MG/1
25 SUPPOSITORY RECTAL EVERY 12 HOURS PRN
Status: DISCONTINUED | OUTPATIENT
Start: 2024-04-12 | End: 2024-04-13 | Stop reason: HOSPADM

## 2024-04-12 RX ADMIN — ONDANSETRON 8 MG: 2 INJECTION INTRAMUSCULAR; INTRAVENOUS at 05:48

## 2024-04-12 RX ADMIN — GABAPENTIN 300 MG: 300 CAPSULE ORAL at 06:35

## 2024-04-12 RX ADMIN — ATORVASTATIN CALCIUM 80 MG: 40 TABLET, FILM COATED ORAL at 20:59

## 2024-04-12 RX ADMIN — BENZTROPINE MESYLATE 0.5 MG: 1 TABLET ORAL at 21:00

## 2024-04-12 RX ADMIN — SODIUM PHOSPHATE, DIBASIC AND SODIUM PHOSPHATE, MONOBASIC 1 ENEMA: 7; 19 ENEMA RECTAL at 22:33

## 2024-04-12 RX ADMIN — THIAMINE HCL TAB 100 MG 100 MG: 100 TAB at 09:29

## 2024-04-12 RX ADMIN — GABAPENTIN 300 MG: 300 CAPSULE ORAL at 14:28

## 2024-04-12 RX ADMIN — ONDANSETRON 4 MG: 2 INJECTION INTRAMUSCULAR; INTRAVENOUS at 13:00

## 2024-04-12 RX ADMIN — PROCHLORPERAZINE MALEATE 10 MG: 10 TABLET ORAL at 16:33

## 2024-04-12 RX ADMIN — ACETAMINOPHEN 650 MG: 325 TABLET ORAL at 17:33

## 2024-04-12 RX ADMIN — ONDANSETRON 4 MG: 2 INJECTION INTRAMUSCULAR; INTRAVENOUS at 02:08

## 2024-04-12 RX ADMIN — LISINOPRIL 40 MG: 20 TABLET ORAL at 09:30

## 2024-04-12 RX ADMIN — ACETAMINOPHEN 650 MG: 325 TABLET ORAL at 10:16

## 2024-04-12 RX ADMIN — Medication 6 MG: at 20:57

## 2024-04-12 RX ADMIN — ACETAMINOPHEN 650 MG: 325 TABLET ORAL at 23:02

## 2024-04-12 RX ADMIN — LACOSAMIDE 50 MG: 100 TABLET, FILM COATED ORAL at 20:56

## 2024-04-12 RX ADMIN — LACTULOSE 20 G: 20 SOLUTION ORAL at 13:17

## 2024-04-12 RX ADMIN — PANTOPRAZOLE SODIUM 40 MG: 40 TABLET, DELAYED RELEASE ORAL at 06:51

## 2024-04-12 RX ADMIN — BENZTROPINE MESYLATE 0.5 MG: 1 TABLET ORAL at 09:30

## 2024-04-12 RX ADMIN — SUMATRIPTAN SUCCINATE 100 MG: 25 TABLET ORAL at 02:32

## 2024-04-12 RX ADMIN — UBROGEPANT 100 MG: 100 TABLET ORAL at 14:30

## 2024-04-12 RX ADMIN — LACOSAMIDE 50 MG: 100 TABLET, FILM COATED ORAL at 09:29

## 2024-04-12 RX ADMIN — FLUTICASONE FUROATE AND VILANTEROL TRIFENATATE 1 PUFF: 100; 25 POWDER RESPIRATORY (INHALATION) at 11:03

## 2024-04-12 RX ADMIN — PRAZOSIN HYDROCHLORIDE 1 MG: 1 CAPSULE ORAL at 20:59

## 2024-04-12 RX ADMIN — POTASSIUM CHLORIDE 40 MEQ: 1500 TABLET, EXTENDED RELEASE ORAL at 08:26

## 2024-04-12 RX ADMIN — TRAZODONE HYDROCHLORIDE 300 MG: 100 TABLET ORAL at 20:59

## 2024-04-12 RX ADMIN — VITAM B12 100 MCG: 100 TAB at 09:29

## 2024-04-12 RX ADMIN — FENOFIBRATE 160 MG: 160 TABLET ORAL at 09:30

## 2024-04-12 RX ADMIN — ENOXAPARIN SODIUM 40 MG: 40 INJECTION SUBCUTANEOUS at 08:24

## 2024-04-12 RX ADMIN — LIDOCAINE 1 PATCH: 4 PATCH TOPICAL at 10:40

## 2024-04-12 RX ADMIN — GABAPENTIN 300 MG: 300 CAPSULE ORAL at 21:00

## 2024-04-12 RX ADMIN — PROCHLORPERAZINE EDISYLATE 10 MG: 5 INJECTION INTRAMUSCULAR; INTRAVENOUS at 03:20

## 2024-04-12 RX ADMIN — ONDANSETRON 4 MG: 2 INJECTION INTRAMUSCULAR; INTRAVENOUS at 22:32

## 2024-04-12 RX ADMIN — GABAPENTIN 300 MG: 300 CAPSULE ORAL at 14:27

## 2024-04-12 RX ADMIN — BUSPIRONE HYDROCHLORIDE 15 MG: 10 TABLET ORAL at 20:58

## 2024-04-12 RX ADMIN — MIRTAZAPINE 15 MG: 15 TABLET, FILM COATED ORAL at 20:58

## 2024-04-12 RX ADMIN — BUSPIRONE HYDROCHLORIDE 15 MG: 10 TABLET ORAL at 09:30

## 2024-04-12 RX ADMIN — PROPRANOLOL HYDROCHLORIDE 40 MG: 40 TABLET ORAL at 22:21

## 2024-04-12 RX ADMIN — PROMETHAZINE HYDROCHLORIDE 12.5 MG: 25 INJECTION INTRAMUSCULAR; INTRAVENOUS at 04:33

## 2024-04-12 RX ADMIN — LEVOTHYROXINE SODIUM 150 MCG: 75 TABLET ORAL at 06:06

## 2024-04-12 RX ADMIN — VILAZODONE HYDROCHLORIDE 40 MG: 20 TABLET ORAL at 08:26

## 2024-04-12 RX ADMIN — LAMOTRIGINE 75 MG: 25 TABLET ORAL at 09:31

## 2024-04-12 ASSESSMENT — COGNITIVE AND FUNCTIONAL STATUS - GENERAL
TURNING FROM BACK TO SIDE WHILE IN FLAT BAD: A LITTLE
DRESSING REGULAR UPPER BODY CLOTHING: A LITTLE
HELP NEEDED FOR BATHING: A LITTLE
TOILETING: A LITTLE
MOVING FROM LYING ON BACK TO SITTING ON SIDE OF FLAT BED WITH BEDRAILS: A LITTLE
MOVING TO AND FROM BED TO CHAIR: A LITTLE
STANDING UP FROM CHAIR USING ARMS: A LITTLE
DRESSING REGULAR UPPER BODY CLOTHING: A LITTLE
DRESSING REGULAR LOWER BODY CLOTHING: A LITTLE
DAILY ACTIVITIY SCORE: 20
DAILY ACTIVITIY SCORE: 21
CLIMB 3 TO 5 STEPS WITH RAILING: A LOT
DRESSING REGULAR LOWER BODY CLOTHING: A LITTLE
WALKING IN HOSPITAL ROOM: A LITTLE
MOBILITY SCORE: 24
HELP NEEDED FOR BATHING: A LITTLE
MOBILITY SCORE: 17

## 2024-04-12 ASSESSMENT — PAIN - FUNCTIONAL ASSESSMENT
PAIN_FUNCTIONAL_ASSESSMENT: 0-10

## 2024-04-12 ASSESSMENT — PAIN SCALES - GENERAL
PAINLEVEL_OUTOF10: 8
PAINLEVEL_OUTOF10: 3
PAINLEVEL_OUTOF10: 3
PAINLEVEL_OUTOF10: 2
PAINLEVEL_OUTOF10: 3
PAINLEVEL_OUTOF10: 10 - WORST POSSIBLE PAIN
PAINLEVEL_OUTOF10: 3
PAINLEVEL_OUTOF10: 2
PAINLEVEL_OUTOF10: 3
PAINLEVEL_OUTOF10: 0 - NO PAIN

## 2024-04-12 NOTE — NURSING NOTE
0815: Pt ate a piece of toast for breakfast, says she feels full with no appetite. No nausea at present time.     0902: ALYSON Segovia CNP at bedside, pt seen. Made aware of multiple emesis overnight and c/o Lt rib pain.     0908: To radiology for xrays.    0923: Returned to room.    1328: Report ot LUZ Hendricks on med/surg.     1342: Transferred to room 234 via bed. Sister present at time of transfer and given room information.

## 2024-04-12 NOTE — CARE PLAN
The patient's goals for the shift include to have no seizure activity    The clinical goals for the shift include No emesis today    Over the shift, the patient did make progress toward the following goals.     1520 Assumed care of patient. Assessment completed as charted. Patient sitting up in bed eating lunch. Denies or pain at this time. States she has had diarrhea today, lactulose was previously given. Seizure pads in place. Neuro assessment completed. Call light within reach.

## 2024-04-12 NOTE — PROGRESS NOTES
Spoke with patient at bedside.  Patient doesn't think she will need home care. AMPAC 18.  She thinks once she's home, she will be fine.  Patient's sister will pick her up on discharge.    DC Plan secure:  Home with no HHC

## 2024-04-12 NOTE — PROGRESS NOTES
Erica Machado is a 58 y.o. female on day 2 of admission presenting with Seizure (Multi).      Subjective   Patient assessed at bedside; lying in bed. She complained of pain under her left breast and abdomen. She complained of vomiting and no appetite. She denies SOB, fever, chills.       Objective     Last Recorded Vitals  /85 (BP Location: Left arm, Patient Position: Lying)   Pulse 88   Temp 36.3 °C (97.3 °F) (Temporal)   Resp 20   Wt 85.9 kg (189 lb 6 oz)   SpO2 99%   Intake/Output last 3 Shifts:    Intake/Output Summary (Last 24 hours) at 4/12/2024 0946  Last data filed at 4/12/2024 0836  Gross per 24 hour   Intake 1520 ml   Output 1850 ml   Net -330 ml       Admission Weight  Weight: 87.1 kg (192 lb) (04/10/24 1637)    Daily Weight  04/12/24 : 85.9 kg (189 lb 6 oz)    Image Results  ECG 12 lead  Sinus bradycardia with 1st degree AV block  Incomplete right bundle branch block  Nonspecific ST abnormality  Abnormal ECG  When compared with ECG of 16-SEP-2023 12:18,  Previous ECG has undetermined rhythm, needs review  Nonspecific T wave abnormality no longer evident in Inferior leads  See ED provider note for full interpretation and clinical correlation  Confirmed by Samantha Jaquez ([id number]) on 4/11/2024 9:01:52 PM      Physical Exam  Vitals reviewed.   Constitutional:       Appearance: Normal appearance. She is normal weight.   HENT:      Head: Normocephalic and atraumatic.      Nose: Nose normal.      Mouth/Throat:      Mouth: Mucous membranes are moist.      Pharynx: Oropharynx is clear.   Eyes:      Conjunctiva/sclera: Conjunctivae normal.      Pupils: Pupils are equal, round, and reactive to light.   Cardiovascular:      Rate and Rhythm: Normal rate and regular rhythm.      Pulses: Normal pulses.      Heart sounds: Normal heart sounds.   Pulmonary:      Effort: Pulmonary effort is normal.      Breath sounds: Normal breath sounds.   Abdominal:      General: Bowel sounds are normal. There is  distension.      Palpations: Abdomen is soft.   Musculoskeletal:         General: Normal range of motion.      Cervical back: Normal range of motion and neck supple.   Skin:     General: Skin is warm and dry.      Findings: Bruising present.   Neurological:      General: No focal deficit present.      Mental Status: She is alert and oriented to person, place, and time.   Psychiatric:         Mood and Affect: Mood normal.         Behavior: Behavior normal.         Relevant Results    Scheduled medications  atorvastatin, 80 mg, oral, Nightly  benztropine, 0.5 mg, oral, BID  busPIRone, 15 mg, oral, BID  cyanocobalamin, 100 mcg, oral, Daily  enoxaparin, 40 mg, subcutaneous, q24h  fenofibrate, 160 mg, oral, Daily  fluticasone furoate-vilanteroL, 1 puff, inhalation, Daily  gabapentin, 300 mg, oral, q8h DWAIN  lacosamide, 50 mg, oral, q12h DWAIN  lamoTRIgine, 75 mg, oral, Daily  levothyroxine, 150 mcg, oral, Daily  lisinopril, 40 mg, oral, Daily  melatonin, 6 mg, oral, Nightly  mirtazapine, 15 mg, oral, Nightly  pantoprazole, 40 mg, oral, Daily before breakfast  prazosin, 1 mg, oral, Nightly  thiamine, 100 mg, oral, Daily  traZODone, 300 mg, oral, Nightly  vilazodone, 40 mg, oral, Daily with breakfast      Continuous medications  sodium chloride 0.9%, 10 mL/hr      PRN medications  PRN medications: acetaminophen, bisacodyl, [Held by provider] diphenoxylate-atropine, ipratropium-albuteroL, loratadine, magnesium hydroxide, melatonin, ondansetron, polyethylene glycol, prazosin, prochlorperazine **OR** prochlorperazine **OR** prochlorperazine, promethazine, sodium chloride 0.9%      Results for orders placed or performed during the hospital encounter of 04/10/24 (from the past 24 hour(s))   ECG 12 lead   Result Value Ref Range    Ventricular Rate 55 BPM    Atrial Rate 55 BPM    NM Interval 218 ms    QRS Duration 104 ms    QT Interval 498 ms    QTC Calculation(Bazett) 476 ms    P Axis 58 degrees    R Axis 9 degrees    T Axis 44  degrees    QRS Count 9 beats    Q Onset 225 ms    P Onset 116 ms    P Offset 170 ms    T Offset 474 ms    QTC Fredericia 483 ms   Comprehensive metabolic panel   Result Value Ref Range    Glucose 124 (H) 74 - 99 mg/dL    Sodium 131 (L) 136 - 145 mmol/L    Potassium 3.3 (L) 3.5 - 5.3 mmol/L    Chloride 98 98 - 107 mmol/L    Bicarbonate 17 (L) 21 - 32 mmol/L    Anion Gap 19 10 - 20 mmol/L    Urea Nitrogen 6 6 - 23 mg/dL    Creatinine 1.06 (H) 0.50 - 1.05 mg/dL    eGFR 62 >60 mL/min/1.73m*2    Calcium 9.7 8.6 - 10.3 mg/dL    Albumin 4.9 3.4 - 5.0 g/dL    Alkaline Phosphatase 60 33 - 110 U/L    Total Protein 7.4 6.4 - 8.2 g/dL    AST 24 9 - 39 U/L    Bilirubin, Total 0.5 0.0 - 1.2 mg/dL    ALT 17 7 - 45 U/L   Magnesium   Result Value Ref Range    Magnesium 1.93 1.60 - 2.40 mg/dL   Lavender Top   Result Value Ref Range    Extra Tube Hold for add-ons.                    Assessment/Plan   This patient currently has cardiac telemetry ordered; if you would like to modify or discontinue the telemetry order, click here to go to the orders activity to modify/discontinue the order.      Principal Problem:    Seizure (Multi)    Seizure Disorder d/t non-compliance to medication  - given lacosamide, midazolam in the ED  - continue lacosamide, lamotrigine  - seizure precautions     Symptomatic Bradycardia, resolved  CAD  HLD  - Cardiac monitoring via ICU  - cardiology consult  - continue atorvastatin, lisinopril,  - monitor BP and HR    Constipation  Nasuea/Vomiting  - KUB pending     Hypothyroidism  Noncompliance with medication  - ; Free T4 <0.25  - continue Levothyoxine  - recheck TSH in 4 weeks     Hypokalemia  - K 3.1 on admission; today K 3.3  - replaced with KCL  - monitor BMP     Hypomagnesia  - Mg 1.41 on admission; today Mg 1.93  - replaced with IV magnesium sulfate  - monitor Mg     Hyponatremia  - Na120 on admission; today Na 131  - given IVF in the ED  - regular diet with 1800 mL fluid restriction     COPD not in  exacerbation  - continue Breo  - continue duoneb, prn     Schizoaffective disorder  Generalized Anxiety Disorder  - continue buspirone, benztropine  - hold trazodone, vilazodone, remeron     B12 Deficiency  - continue cyanocobalamin     PUD ppx  - continue pantoprazole     DVT ppx  - started enoxaparin     Code Status: Full     Disposition: Patient requires more than 2 inpatient days. Stable to transfer to [facility]     Total accumulated time spent face to face and not face to face preparing to see the patient, obtaining and reviewing separately obtained history; performing a medically appropriate examination and/or evaluation; counseling and educating the patient, family; ordering medications, tests, or procedures; referring and communicating with other health care professionals; documenting clinical information in the patient's medical record; independently interpreting results and communicating the results to the patient, family; and care coordination was 30 minutes.               Bonnie Segovia, APRN-CNP

## 2024-04-13 VITALS
WEIGHT: 192.46 LBS | BODY MASS INDEX: 28.51 KG/M2 | TEMPERATURE: 98.2 F | HEART RATE: 62 BPM | DIASTOLIC BLOOD PRESSURE: 86 MMHG | RESPIRATION RATE: 18 BRPM | SYSTOLIC BLOOD PRESSURE: 136 MMHG | HEIGHT: 69 IN | OXYGEN SATURATION: 98 %

## 2024-04-13 DIAGNOSIS — K59.00 CONSTIPATION, UNSPECIFIED CONSTIPATION TYPE: ICD-10-CM

## 2024-04-13 LAB
ALBUMIN SERPL BCP-MCNC: 4.8 G/DL (ref 3.4–5)
ALP SERPL-CCNC: 59 U/L (ref 33–110)
ALT SERPL W P-5'-P-CCNC: 16 U/L (ref 7–45)
ANION GAP SERPL CALC-SCNC: 13 MMOL/L (ref 10–20)
AST SERPL W P-5'-P-CCNC: 19 U/L (ref 9–39)
BILIRUB SERPL-MCNC: 0.4 MG/DL (ref 0–1.2)
BUN SERPL-MCNC: 6 MG/DL (ref 6–23)
CALCIUM SERPL-MCNC: 9.7 MG/DL (ref 8.6–10.3)
CHLORIDE SERPL-SCNC: 104 MMOL/L (ref 98–107)
CO2 SERPL-SCNC: 21 MMOL/L (ref 21–32)
CREAT SERPL-MCNC: 1.14 MG/DL (ref 0.5–1.05)
EGFRCR SERPLBLD CKD-EPI 2021: 57 ML/MIN/1.73M*2
GLUCOSE SERPL-MCNC: 109 MG/DL (ref 74–99)
HOLD SPECIMEN: NORMAL
MAGNESIUM SERPL-MCNC: 2.03 MG/DL (ref 1.6–2.4)
POTASSIUM SERPL-SCNC: 4.3 MMOL/L (ref 3.5–5.3)
PROT SERPL-MCNC: 7.3 G/DL (ref 6.4–8.2)
SODIUM SERPL-SCNC: 134 MMOL/L (ref 136–145)

## 2024-04-13 PROCEDURE — 99232 SBSQ HOSP IP/OBS MODERATE 35: CPT

## 2024-04-13 PROCEDURE — 80053 COMPREHEN METABOLIC PANEL: CPT | Mod: IPSPLIT | Performed by: NURSE PRACTITIONER

## 2024-04-13 PROCEDURE — 94640 AIRWAY INHALATION TREATMENT: CPT | Mod: IPSPLIT

## 2024-04-13 PROCEDURE — 2500000004 HC RX 250 GENERAL PHARMACY W/ HCPCS (ALT 636 FOR OP/ED): Mod: IPSPLIT | Performed by: NURSE PRACTITIONER

## 2024-04-13 PROCEDURE — 2500000005 HC RX 250 GENERAL PHARMACY W/O HCPCS: Mod: IPSPLIT | Performed by: NURSE PRACTITIONER

## 2024-04-13 PROCEDURE — 36415 COLL VENOUS BLD VENIPUNCTURE: CPT | Mod: IPSPLIT | Performed by: NURSE PRACTITIONER

## 2024-04-13 PROCEDURE — 2500000001 HC RX 250 WO HCPCS SELF ADMINISTERED DRUGS (ALT 637 FOR MEDICARE OP): Mod: IPSPLIT | Performed by: NURSE PRACTITIONER

## 2024-04-13 PROCEDURE — 83735 ASSAY OF MAGNESIUM: CPT | Mod: IPSPLIT

## 2024-04-13 RX ORDER — DOCUSATE SODIUM 100 MG/1
100 CAPSULE, LIQUID FILLED ORAL 2 TIMES DAILY
Qty: 60 CAPSULE | Refills: 0 | Status: SHIPPED | OUTPATIENT
Start: 2024-04-13 | End: 2024-05-13

## 2024-04-13 RX ORDER — POLYETHYLENE GLYCOL 3350 17 G/17G
POWDER, FOR SOLUTION ORAL
Qty: 238 G | Refills: 0 | Status: SHIPPED | OUTPATIENT
Start: 2024-04-13 | End: 2024-05-13

## 2024-04-13 RX ORDER — POLYETHYLENE GLYCOL 3350 17 G/17G
17 POWDER, FOR SOLUTION ORAL DAILY PRN
Qty: 30 EACH | Refills: 0 | Status: SHIPPED | OUTPATIENT
Start: 2024-04-13 | End: 2024-04-13

## 2024-04-13 RX ORDER — LEVOTHYROXINE SODIUM 150 UG/1
150 TABLET ORAL DAILY
Qty: 30 TABLET | Refills: 0 | Status: SHIPPED | OUTPATIENT
Start: 2024-04-14 | End: 2024-05-14

## 2024-04-13 RX ADMIN — FLUTICASONE FUROATE AND VILANTEROL TRIFENATATE 1 PUFF: 100; 25 POWDER RESPIRATORY (INHALATION) at 10:25

## 2024-04-13 RX ADMIN — BUSPIRONE HYDROCHLORIDE 15 MG: 10 TABLET ORAL at 09:03

## 2024-04-13 RX ADMIN — LIDOCAINE 1 PATCH: 4 PATCH TOPICAL at 09:08

## 2024-04-13 RX ADMIN — LACOSAMIDE 50 MG: 100 TABLET, FILM COATED ORAL at 09:03

## 2024-04-13 RX ADMIN — GABAPENTIN 300 MG: 300 CAPSULE ORAL at 06:05

## 2024-04-13 RX ADMIN — FENOFIBRATE 160 MG: 160 TABLET ORAL at 09:04

## 2024-04-13 RX ADMIN — LEVOTHYROXINE SODIUM 150 MCG: 75 TABLET ORAL at 06:05

## 2024-04-13 RX ADMIN — THIAMINE HCL TAB 100 MG 100 MG: 100 TAB at 09:13

## 2024-04-13 RX ADMIN — VILAZODONE HYDROCHLORIDE 40 MG: 20 TABLET ORAL at 09:02

## 2024-04-13 RX ADMIN — PANTOPRAZOLE SODIUM 40 MG: 40 TABLET, DELAYED RELEASE ORAL at 06:05

## 2024-04-13 RX ADMIN — VITAM B12 100 MCG: 100 TAB at 09:03

## 2024-04-13 RX ADMIN — LISINOPRIL 40 MG: 20 TABLET ORAL at 09:02

## 2024-04-13 RX ADMIN — LAMOTRIGINE 75 MG: 25 TABLET ORAL at 09:06

## 2024-04-13 RX ADMIN — BENZTROPINE MESYLATE 0.5 MG: 1 TABLET ORAL at 09:02

## 2024-04-13 RX ADMIN — ENOXAPARIN SODIUM 40 MG: 40 INJECTION SUBCUTANEOUS at 09:13

## 2024-04-13 ASSESSMENT — COGNITIVE AND FUNCTIONAL STATUS - GENERAL
DRESSING REGULAR LOWER BODY CLOTHING: A LITTLE
STANDING UP FROM CHAIR USING ARMS: A LITTLE
HELP NEEDED FOR BATHING: A LITTLE
MOVING FROM LYING ON BACK TO SITTING ON SIDE OF FLAT BED WITH BEDRAILS: A LITTLE
CLIMB 3 TO 5 STEPS WITH RAILING: A LOT
MOVING TO AND FROM BED TO CHAIR: A LITTLE
MOBILITY SCORE: 17
TURNING FROM BACK TO SIDE WHILE IN FLAT BAD: A LITTLE
WALKING IN HOSPITAL ROOM: A LITTLE
DRESSING REGULAR UPPER BODY CLOTHING: A LITTLE

## 2024-04-13 ASSESSMENT — PAIN DESCRIPTION - LOCATION: LOCATION: RIB CAGE

## 2024-04-13 ASSESSMENT — PAIN - FUNCTIONAL ASSESSMENT
PAIN_FUNCTIONAL_ASSESSMENT: 0-10
PAIN_FUNCTIONAL_ASSESSMENT: 0-10

## 2024-04-13 ASSESSMENT — PAIN SCALES - GENERAL
PAINLEVEL_OUTOF10: 4
PAINLEVEL_OUTOF10: 0 - NO PAIN

## 2024-04-13 NOTE — DISCHARGE SUMMARY
Discharge Diagnosis  Seizure (Multi)    Issues Requiring Follow-Up    Continue levothyroxine- you should have TSH rechecked in 4-6 weeks    Follow up with PCP for repeat labs to monitor kidney function    Discharge Meds     Your medication list        START taking these medications        Instructions Last Dose Given Next Dose Due   docusate sodium 100 mg capsule  Commonly known as: Colace      Take 1 capsule (100 mg) by mouth 2 times a day.       polyethylene glycol 17 gram packet  Commonly known as: Glycolax, Miralax      Take 17 g by mouth once daily as needed (constipation).              CHANGE how you take these medications        Instructions Last Dose Given Next Dose Due   levothyroxine 150 mcg tablet  Commonly known as: Synthroid, Levoxyl  Start taking on: April 14, 2024  What changed: when to take this      Take 1 tablet (150 mcg) by mouth early in the morning..              CONTINUE taking these medications        Instructions Last Dose Given Next Dose Due   albuterol 108 (90 Base) MCG/ACT inhaler           atorvastatin 80 mg tablet  Commonly known as: Lipitor           benztropine 0.5 mg tablet  Commonly known as: Cogentin           busPIRone 15 mg tablet  Commonly known as: Buspar           cholecalciferol 50 MCG (2000 UT) tablet  Commonly known as: Vitamin D-3           cyanocobalamin 100 mcg tablet  Commonly known as: Vitamin B-12           diphenoxylate-atropine 2.5-0.025 mg tablet  Commonly known as: Lomotil           Dulera 200-5 mcg/actuation inhaler  Generic drug: mometasone-formoterol           fenofibrate 160 mg tablet  Commonly known as: Triglide           fluticasone 50 mcg/actuation nasal spray  Commonly known as: Flonase           gabapentin 300 mg capsule  Commonly known as: Neurontin           haloperidol decanoate 50 mg/mL injection  Commonly known as: Haldol Decanoate           lamoTRIgine 25 mg tablet  Commonly known as: LaMICtal           lisinopril 40 mg tablet           loratadine 10  mg tablet  Commonly known as: Claritin           mirtazapine 15 mg tablet  Commonly known as: Remeron           omega-3 1,000 mg capsule capsule           pantoprazole 40 mg EC tablet  Commonly known as: ProtoNix           prazosin 1 mg capsule  Commonly known as: Minipress           promethazine 12.5 mg tablet  Commonly known as: Phenergan           promethazine 12.5 mg tablet  Commonly known as: Phenergan           Nurtec ODT 75 mg tablet,disintegrating  Generic drug: rimegepant           tiZANidine 4 mg tablet  Commonly known as: Zanaflex           traZODone 300 mg tablet  Commonly known as: Desyrel           Ubrelvy 100 mg tablet tablet  Generic drug: ubrogepant           vilazodone 40 mg tablet  Commonly known as: Viibryd           Vimpat 50 mg tablet  Generic drug: lacosamide           Vitamin B-1 100 mg tablet  Generic drug: thiamine           zolpidem 10 mg tablet  Commonly known as: Ambien                     Where to Get Your Medications        These medications were sent to [facility]/pharmacy #[id number] [place] [address]      Phone: [phone]   docusate sodium 100 mg capsule  levothyroxine 150 mcg tablet  polyethylene glycol 17 gram packet         Test Results Pending At Discharge  Pending Labs       No current pending labs.            Hospital Course     Erica Machado is a 58 y.o. female presenting with a complaint of seizures. She ran out of her seizure medications; she has not taken them for the past 3 days. She had 3 seizure episodes at home. She sees her neurologist every 6 months. She complained of dizziness and palpitations. She has not been taking her levothyroxine either. She denies SOB, fever, chills, N/V/D/C.     Seizure Disorder d/t non-compliance to medication  - given lacosamide, midazolam in the ED  - continue lacosamide, lamotrigine  - seizure precautions     Symptomatic Bradycardia, resolved  CAD  HLD  - Cardiac monitoring via ICU  -  continue atorvastatin, lisinopril,  - monitor BP and HR  - cardiology consulted, no further testing at this time      Constipation (resolved)   Nasuea/Vomiting  - KUB with non-obstructive bowel gas  - Given Fleet's enema with good results  - Discharge on bowel regimen     Hypothyroidism  Noncompliance with medication  - ; Free T4 <0.25  - continue Levothyoxine  - recheck TSH in 4 weeks     Hypokalemia  - K 3.1 on admission; today K 4.3  - monitor BMP     Hypomagnesemia (resolved)  - Mg 1.41 on admission; today Mg 2.03  - monitor Mg     Hyponatremia (improving)  - Na120 on admission; today Na 134  - given IVF in the ED  - regular diet with 1800 mL fluid restriction     COPD not in exacerbation  - continue Breo  - continue duoneb, prn  - remains on room air    Schizoaffective disorder  Generalized Anxiety Disorder  - continue buspirone, benztropine  - hold trazodone, vilazodone, remeron     B12 Deficiency  - continue cyanocobalamin     PUD ppx  - continue pantoprazole     DVT ppx  - started enoxaparin     Code Status: Full     Disposition: Patient stable for discharge home. Prescription refills sent to pharmacy; patient counseled on importance of medication compliance. She will follow up with her PCP for a BMP next week to monitor electrolytes and renal function. She has instructions to have her TSH rechecked in 4-6 weeks.      Total accumulated time spent face to face and not face to face preparing to see the patient, obtaining and reviewing separately obtained history; performing a medically appropriate examination and/or evaluation; counseling and educating the patient, family; ordering medications, tests, or procedures; referring and communicating with other health care professionals; documenting clinical information in the patient's medical record; independently interpreting results and communicating the results to the patient, family; and care coordination was 30 minutes.       Pertinent Physical Exam At  Time of Discharge  Physical Exam  Constitutional:       General: She is not in acute distress.     Appearance: She is not toxic-appearing.   HENT:      Head: Normocephalic and atraumatic.      Mouth/Throat:      Mouth: Mucous membranes are moist.   Eyes:      Conjunctiva/sclera: Conjunctivae normal.   Cardiovascular:      Rate and Rhythm: Normal rate and regular rhythm.   Pulmonary:      Effort: No respiratory distress.      Breath sounds: Normal breath sounds.   Abdominal:      General: There is no distension.      Palpations: Abdomen is soft.      Tenderness: There is no abdominal tenderness.   Musculoskeletal:         General: No swelling.   Skin:     General: Skin is warm and dry.      Findings: Bruising (chin) present.   Neurological:      Mental Status: She is alert and oriented to person, place, and time.   Psychiatric:         Mood and Affect: Mood normal.         Behavior: Behavior normal.         Outpatient Follow-Up  Future Appointments   Date Time Provider Department Center   4/15/2024  1:45 PM Destin Pinzon MD [id number] Eldon Suarez PA-C

## 2024-04-13 NOTE — CARE PLAN
The patient's goals for the shift include to have no seizure activity    The clinical goals for the shift include patient to pass solid stool overnight    Pt had BM's overnight and do seizure activity. Pt ready for Discharge. Instructions reviewed and pt voiced understandiing.  Problem: Skin  Goal: Decreased wound size/increased tissue granulation at next dressing change  Outcome: Adequate for Discharge  Goal: Participates in plan/prevention/treatment measures  Outcome: Adequate for Discharge  Goal: Prevent/manage excess moisture  Outcome: Adequate for Discharge  Goal: Prevent/minimize sheer/friction injuries  Outcome: Adequate for Discharge  Goal: Promote/optimize nutrition  Outcome: Adequate for Discharge  Goal: Promote skin healing  Outcome: Adequate for Discharge     Problem: Pain - Adult  Goal: Verbalizes/displays adequate comfort level or baseline comfort level  Outcome: Adequate for Discharge     Problem: Safety - Adult  Goal: Free from fall injury  Outcome: Adequate for Discharge     Problem: Discharge Planning  Goal: Discharge to home or other facility with appropriate resources  Outcome: Adequate for Discharge     Problem: Chronic Conditions and Co-morbidities  Goal: Patient's chronic conditions and co-morbidity symptoms are monitored and maintained or improved  Outcome: Adequate for Discharge     Problem: Fall/Injury  Goal: Not fall by end of shift  Outcome: Adequate for Discharge  Goal: Be free from injury by end of the shift  Outcome: Adequate for Discharge  Goal: Verbalize understanding of personal risk factors for fall in the hospital  Outcome: Adequate for Discharge  Goal: Verbalize understanding of risk factor reduction measures to prevent injury from fall in the home  Outcome: Adequate for Discharge  Goal: Use assistive devices by end of the shift  Outcome: Adequate for Discharge  Goal: Pace activities to prevent fatigue by end of the shift  Outcome: Adequate for Discharge     Problem: Pain  Goal:  Takes deep breaths with improved pain control throughout the shift  Outcome: Adequate for Discharge  Goal: Turns in bed with improved pain control throughout the shift  Outcome: Adequate for Discharge  Goal: Walks with improved pain control throughout the shift  Outcome: Adequate for Discharge  Goal: Performs ADL's with improved pain control throughout shift  Outcome: Adequate for Discharge  Goal: Participates in PT with improved pain control throughout the shift  Outcome: Adequate for Discharge  Goal: Free from opioid side effects throughout the shift  Outcome: Adequate for Discharge  Goal: Free from acute confusion related to pain meds throughout the shift  Outcome: Adequate for Discharge

## 2024-04-13 NOTE — CARE PLAN
The patient's goals for the shift include to have no seizure activity    The clinical goals for the shift include patient to pass solid stool overnight    Over the shift, the patient did not make progress toward the following goals. Barriers to progression include the following listed below. Patient began propranolol in attempt to control migraine and BP. Fleet enema given, moderate watery stool with small pieces from bowel movement noted and abdominal pain relief report to RN. Patient has new IV placed. No seizure activity noted overnight and patient remains in seizure precautions.     Problem: Discharge Planning  Goal: Discharge to home or other facility with appropriate resources  Outcome: Not Progressing     Problem: Chronic Conditions and Co-morbidities  Goal: Patient's chronic conditions and co-morbidity symptoms are monitored and maintained or improved  Outcome: Not Progressing     Problem: Pain  Goal: Takes deep breaths with improved pain control throughout the shift  Outcome: Not Progressing  Goal: Turns in bed with improved pain control throughout the shift  Outcome: Not Progressing  Goal: Walks with improved pain control throughout the shift  Outcome: Not Progressing  Goal: Performs ADL's with improved pain control throughout shift  Outcome: Not Progressing  Goal: Participates in PT with improved pain control throughout the shift  Outcome: Not Progressing

## 2024-04-14 LAB
ATRIAL RATE: 44 BPM
P AXIS: 67 DEGREES
P OFFSET: 167 MS
P ONSET: 106 MS
PR INTERVAL: 238 MS
Q ONSET: 225 MS
QRS COUNT: 7 BEATS
QRS DURATION: 90 MS
QT INTERVAL: 574 MS
QTC CALCULATION(BAZETT): 490 MS
QTC FREDERICIA: 517 MS
R AXIS: 41 DEGREES
T AXIS: 61 DEGREES
T OFFSET: 512 MS
VENTRICULAR RATE: 44 BPM

## 2024-04-15 ENCOUNTER — APPOINTMENT (OUTPATIENT)
Dept: SURGERY | Facility: CLINIC | Age: 59
End: 2024-04-15
Payer: MEDICAID

## 2024-04-17 ENCOUNTER — LAB (OUTPATIENT)
Dept: LAB | Facility: LAB | Age: 59
End: 2024-04-17
Payer: MEDICAID

## 2024-04-17 DIAGNOSIS — E06.3 AUTOIMMUNE THYROIDITIS: ICD-10-CM

## 2024-04-17 DIAGNOSIS — E03.8 OTHER SPECIFIED HYPOTHYROIDISM: Primary | ICD-10-CM

## 2024-04-17 DIAGNOSIS — E03.9 HYPOTHYROIDISM, UNSPECIFIED TYPE: ICD-10-CM

## 2024-04-17 DIAGNOSIS — E87.1 HYPONATREMIA: ICD-10-CM

## 2024-04-17 LAB
ANION GAP SERPL CALC-SCNC: 13 MMOL/L (ref 10–20)
BUN SERPL-MCNC: 18 MG/DL (ref 6–23)
CALCIUM SERPL-MCNC: 10.1 MG/DL (ref 8.6–10.3)
CHLORIDE SERPL-SCNC: 99 MMOL/L (ref 98–107)
CO2 SERPL-SCNC: 26 MMOL/L (ref 21–32)
CREAT SERPL-MCNC: 1.24 MG/DL (ref 0.5–1.05)
EGFRCR SERPLBLD CKD-EPI 2021: 51 ML/MIN/1.73M*2
GLUCOSE SERPL-MCNC: 119 MG/DL (ref 74–99)
POTASSIUM SERPL-SCNC: 4.7 MMOL/L (ref 3.5–5.3)
SODIUM SERPL-SCNC: 133 MMOL/L (ref 136–145)
T4 FREE SERPL-MCNC: 0.6 NG/DL (ref 0.78–1.48)
TSH SERPL-ACNC: 54.6 MIU/L (ref 0.44–3.98)

## 2024-04-17 PROCEDURE — 80048 BASIC METABOLIC PNL TOTAL CA: CPT

## 2024-04-17 PROCEDURE — 84439 ASSAY OF FREE THYROXINE: CPT

## 2024-04-17 PROCEDURE — 36415 COLL VENOUS BLD VENIPUNCTURE: CPT

## 2024-04-17 PROCEDURE — 84443 ASSAY THYROID STIM HORMONE: CPT

## 2024-05-06 ENCOUNTER — OFFICE VISIT (OUTPATIENT)
Dept: SURGERY | Facility: CLINIC | Age: 59
End: 2024-05-06
Payer: MEDICAID

## 2024-05-06 VITALS
TEMPERATURE: 97 F | SYSTOLIC BLOOD PRESSURE: 122 MMHG | HEART RATE: 60 BPM | HEIGHT: 69 IN | WEIGHT: 191 LBS | DIASTOLIC BLOOD PRESSURE: 78 MMHG | BODY MASS INDEX: 28.29 KG/M2

## 2024-05-06 DIAGNOSIS — K59.1 FUNCTIONAL DIARRHEA: ICD-10-CM

## 2024-05-06 DIAGNOSIS — R11.2 NAUSEA AND VOMITING, UNSPECIFIED VOMITING TYPE: Primary | ICD-10-CM

## 2024-05-06 DIAGNOSIS — Z12.11 SCREEN FOR COLON CANCER: ICD-10-CM

## 2024-05-06 PROBLEM — R11.10 VOMITING: Status: ACTIVE | Noted: 2024-05-06

## 2024-05-06 PROCEDURE — 3008F BODY MASS INDEX DOCD: CPT | Performed by: SURGERY

## 2024-05-06 PROCEDURE — RXMED WILLOW AMBULATORY MEDICATION CHARGE

## 2024-05-06 PROCEDURE — 99214 OFFICE O/P EST MOD 30 MIN: CPT | Performed by: SURGERY

## 2024-05-06 RX ORDER — ONDANSETRON 4 MG/1
4 TABLET, FILM COATED ORAL EVERY 8 HOURS PRN
Qty: 20 TABLET | Refills: 3 | Status: SHIPPED | OUTPATIENT
Start: 2024-05-06 | End: 2024-06-05

## 2024-05-06 RX ORDER — SODIUM PICOSULFATE, MAGNESIUM OXIDE, AND ANHYDROUS CITRIC ACID 12; 3.5; 1 G/175ML; G/175ML; MG/175ML
2 LIQUID ORAL AS NEEDED
Qty: 350 ML | Refills: 0 | Status: SHIPPED | OUTPATIENT
Start: 2024-05-06

## 2024-05-06 RX ORDER — CHOLESTYRAMINE 4 G/4.8G
4 POWDER, FOR SUSPENSION ORAL 2 TIMES DAILY
Qty: 60 PACKET | Refills: 11 | Status: SHIPPED | OUTPATIENT
Start: 2024-05-06 | End: 2025-05-06

## 2024-05-06 ASSESSMENT — ENCOUNTER SYMPTOMS
DIARRHEA: 1
NAUSEA: 1
VOMITING: 1

## 2024-05-06 NOTE — PATIENT INSTRUCTIONS
Have sent a prescription of cholestyramine for your diarrhea.  I want you to take some Zofran for your nausea.  We will schedule your colonoscopy in October 3, 2024.  My office will provide you with the preprocedure instructions

## 2024-05-06 NOTE — PROGRESS NOTES
Subjective   Patient ID: Erica Machado is a 58 y.o. female who presents for diarrhea and vomiting     HPI     This patient presents for persistent diarrhea and intermittent projectile vomiting.  Her history dates back to September 2023 when she was admitted to [facility] complaining of diarrhea or vomiting.  She was noted to have an elevated TSH and a decreased T4.  C. difficile was negative.  A CT scan confirmed a possible cecal abnormality.  She has had a negative Cologuard test recently.  She was recently admitted to the hospital with seizures     Past Medical History:   Diagnosis Date    Anxiety     COPD (chronic obstructive pulmonary disease) (Multi)     Diverticulosis     GERD (gastroesophageal reflux disease)     HLD (hyperlipidemia)     Hypertension     Hypothyroidism     Insomnia     Migraine     Osteoarthritis     Other specified anxiety disorders 01/03/2022    Depression with anxiety    Schizoaffective disorder (Multi)     Seizure (Multi)         Current Outpatient Medications on File Prior to Visit   Medication Sig Dispense Refill    albuterol 108 (90 Base) MCG/ACT inhaler 2 puffs every 4 hours.      atorvastatin (Lipitor) 80 mg tablet Take 1 tablet (80 mg) by mouth once daily at bedtime. Has not taken for approx 1 month      benztropine (Cogentin) 0.5 mg tablet Take 1 tablet (0.5 mg) by mouth twice a day.      busPIRone (Buspar) 15 mg tablet Take 1 tablet (15 mg) by mouth 2 times a day.      cholecalciferol (Vitamin D-3) 50 MCG (2000 UT) tablet Take 1 tablet (2,000 Units) by mouth once daily.      cyanocobalamin (Vitamin B-12) 100 mcg tablet Take 1 tablet (100 mcg) by mouth once daily.      diphenoxylate-atropine (Lomotil) 2.5-0.025 mg tablet Take 1 tablet by mouth 3 times a day as needed for diarrhea.      docusate sodium (Colace) 100 mg capsule Take 1 capsule (100 mg) by mouth 2 times a day. 60 capsule 0    fenofibrate (Triglide) 160 mg tablet Take 1 tablet (160 mg) by mouth once  daily.      fluticasone (Flonase) 50 mcg/actuation nasal spray Administer 1 spray into each nostril once daily.      gabapentin (Neurontin) 300 mg capsule Take 1 capsule (300 mg) by mouth 3 times a day.      haloperidol decanoate (Haldol Decanoate) 50 mg/mL injection Inject 1.5 mL (75 mg) into the muscle every 28 (twenty-eight) days.      lacosamide (Vimpat) 50 mg tablet Take 1 tablet (50 mg) by mouth 2 times a day.      lamoTRIgine (LaMICtal) 25 mg tablet Take 3 tablets (75 mg) by mouth once daily.      levothyroxine (Synthroid, Levoxyl) 150 mcg tablet Take 1 tablet (150 mcg) by mouth early in the morning.. 30 tablet 0    lisinopril 40 mg tablet Take 1 tablet (40 mg) by mouth once daily.      loratadine (Claritin) 10 mg tablet Take 1 tablet (10 mg) by mouth once daily as needed for allergies.      mirtazapine (Remeron) 15 mg tablet Take 1 tablet (15 mg) by mouth once daily at bedtime.      mometasone-formoterol (Dulera) 200-5 mcg/actuation inhaler Inhale 2 puffs 2 times a day.      omega-3 1,000 mg capsule capsule Take 1 capsule (1,000 mg) by mouth 2 times a day.      pantoprazole (ProtoNix) 40 mg EC tablet Take 1 tablet (40 mg) by mouth once daily in the morning. Take before meals.      polyethylene glycol (Glycolax, Miralax) 17 gram/dose powder 17G (1 CAPFUL) BY MOUTH DAILY 238 g 0    prazosin (Minipress) 1 mg capsule Take 1 capsule (1 mg) by mouth as needed at bedtime (PTSD, flashbacks).      promethazine (Phenergan) 12.5 mg tablet Take 1 tablet (12.5 mg) by mouth every 6 hours if needed for nausea or vomiting.      rimegepant (Nurtec ODT) 75 mg tablet,disintegrating Take 1 tablet (75 mg) by mouth once daily as needed (migraine).      thiamine (Vitamin B-1) 100 mg tablet Take 1 tablet (100 mg) by mouth once daily.      tiZANidine (Zanaflex) 4 mg tablet Take 1 tablet (4 mg) by mouth every 8 hours if needed for muscle spasms.      traZODone (Desyrel) 300 mg tablet Take 1 tablet (300 mg) by mouth once daily at  bedtime.      ubrogepant (Ubrelvy) 100 mg tablet tablet Take 1 tablet (100 mg) by mouth if needed (migraine).      vilazodone (Viibryd) 40 mg tablet Take 1 tablet (40 mg) by mouth once daily with breakfast.      zolpidem (Ambien) 10 mg tablet Take 1 tablet (10 mg) by mouth as needed at bedtime for sleep.       No current facility-administered medications on file prior to visit.        Review of Systems   Gastrointestinal:  Positive for diarrhea, nausea and vomiting.       Vitals:    05/06/24 1507   BP: 122/78   Pulse: 60   Temp: 36.1 °C (97 °F)        Objective     Physical Exam  Vitals reviewed. Exam conducted with a chaperone present.   Constitutional:       Appearance: Normal appearance.   HENT:      Head: Normocephalic.   Cardiovascular:      Rate and Rhythm: Normal rate and regular rhythm.      Heart sounds: Normal heart sounds.   Pulmonary:      Effort: Pulmonary effort is normal.      Breath sounds: Normal breath sounds.   Abdominal:      General: Abdomen is flat.      Palpations: Abdomen is soft. There is no mass.      Tenderness: There is abdominal tenderness in the left lower quadrant. There is no guarding.   Musculoskeletal:         General: Normal range of motion.      Cervical back: Normal range of motion.   Skin:     General: Skin is warm.   Neurological:      General: No focal deficit present.   Psychiatric:         Mood and Affect: Mood normal.         Problem List Items Addressed This Visit       Functional diarrhea - Primary    Vomiting        Assessment/Plan   Persistent diarrhea.  Negative Cologuard.  Intermittent vomiting.  History of schizoaffective disorder  Plan-trial of cholestyramine for diarrhea.  Trial of Zofran for nausea.    EGD colonoscopy October 3, 2024.    Destin Pinzon MD

## 2024-05-08 ENCOUNTER — PHARMACY VISIT (OUTPATIENT)
Dept: PHARMACY | Facility: CLINIC | Age: 59
End: 2024-05-08
Payer: MEDICAID

## 2024-06-15 ENCOUNTER — APPOINTMENT (OUTPATIENT)
Dept: CARDIOLOGY | Facility: HOSPITAL | Age: 59
End: 2024-06-15
Payer: MEDICAID

## 2024-06-15 ENCOUNTER — HOSPITAL ENCOUNTER (INPATIENT)
Facility: HOSPITAL | Age: 59
End: 2024-06-15
Attending: EMERGENCY MEDICINE | Admitting: INTERNAL MEDICINE
Payer: MEDICAID

## 2024-06-15 ENCOUNTER — APPOINTMENT (OUTPATIENT)
Dept: RADIOLOGY | Facility: HOSPITAL | Age: 59
End: 2024-06-15
Payer: MEDICAID

## 2024-06-15 DIAGNOSIS — R10.84 GENERALIZED ABDOMINAL PAIN: ICD-10-CM

## 2024-06-15 DIAGNOSIS — R11.2 NAUSEA AND VOMITING, UNSPECIFIED VOMITING TYPE: ICD-10-CM

## 2024-06-15 DIAGNOSIS — R56.9 SEIZURE (MULTI): ICD-10-CM

## 2024-06-15 DIAGNOSIS — E87.6 HYPOKALEMIA: ICD-10-CM

## 2024-06-15 DIAGNOSIS — E87.1 HYPONATREMIA: Primary | ICD-10-CM

## 2024-06-15 LAB
ALBUMIN SERPL BCP-MCNC: 4.7 G/DL (ref 3.4–5)
ALP SERPL-CCNC: 55 U/L (ref 33–110)
ALT SERPL W P-5'-P-CCNC: 22 U/L (ref 7–45)
AMPHETAMINES UR QL SCN: ABNORMAL
ANION GAP SERPL CALC-SCNC: 14 MMOL/L (ref 10–20)
ANION GAP SERPL CALC-SCNC: 15 MMOL/L (ref 10–20)
APPEARANCE UR: CLEAR
APPEARANCE UR: CLEAR
AST SERPL W P-5'-P-CCNC: 33 U/L (ref 9–39)
BARBITURATES UR QL SCN: ABNORMAL
BASOPHILS # BLD AUTO: 0.03 X10*3/UL (ref 0–0.1)
BASOPHILS NFR BLD AUTO: 0.3 %
BENZODIAZ UR QL SCN: ABNORMAL
BILIRUB SERPL-MCNC: 0.8 MG/DL (ref 0–1.2)
BILIRUB UR STRIP.AUTO-MCNC: NEGATIVE MG/DL
BILIRUB UR STRIP.AUTO-MCNC: NEGATIVE MG/DL
BUN SERPL-MCNC: 3 MG/DL (ref 6–23)
BUN SERPL-MCNC: 3 MG/DL (ref 6–23)
BZE UR QL SCN: ABNORMAL
CALCIUM SERPL-MCNC: 9.2 MG/DL (ref 8.6–10.3)
CALCIUM SERPL-MCNC: 9.2 MG/DL (ref 8.6–10.3)
CANNABINOIDS UR QL SCN: ABNORMAL
CARDIAC TROPONIN I PNL SERPL HS: 6 NG/L (ref 0–13)
CHLORIDE SERPL-SCNC: 82 MMOL/L (ref 98–107)
CHLORIDE SERPL-SCNC: 93 MMOL/L (ref 98–107)
CO2 SERPL-SCNC: 22 MMOL/L (ref 21–32)
CO2 SERPL-SCNC: 23 MMOL/L (ref 21–32)
COLOR UR: ABNORMAL
COLOR UR: COLORLESS
CREAT SERPL-MCNC: 0.56 MG/DL (ref 0.5–1.05)
CREAT SERPL-MCNC: 0.64 MG/DL (ref 0.5–1.05)
EGFRCR SERPLBLD CKD-EPI 2021: >90 ML/MIN/1.73M*2
EGFRCR SERPLBLD CKD-EPI 2021: >90 ML/MIN/1.73M*2
EOSINOPHIL # BLD AUTO: 9.07 X10*3/UL (ref 0–0.7)
EOSINOPHIL NFR BLD AUTO: 77.5 %
ERYTHROCYTE [DISTWIDTH] IN BLOOD BY AUTOMATED COUNT: 13.1 % (ref 11.5–14.5)
FENTANYL+NORFENTANYL UR QL SCN: ABNORMAL
GLUCOSE SERPL-MCNC: 121 MG/DL (ref 74–99)
GLUCOSE SERPL-MCNC: 144 MG/DL (ref 74–99)
GLUCOSE UR STRIP.AUTO-MCNC: NORMAL MG/DL
GLUCOSE UR STRIP.AUTO-MCNC: NORMAL MG/DL
HCT VFR BLD AUTO: 37.4 % (ref 36–46)
HGB BLD-MCNC: 13.8 G/DL (ref 12–16)
HOLD SPECIMEN: NORMAL
IMM GRANULOCYTES # BLD AUTO: 0.1 X10*3/UL (ref 0–0.7)
IMM GRANULOCYTES NFR BLD AUTO: 0.9 % (ref 0–0.9)
KETONES UR STRIP.AUTO-MCNC: ABNORMAL MG/DL
KETONES UR STRIP.AUTO-MCNC: NEGATIVE MG/DL
LACTATE SERPL-SCNC: 1.6 MMOL/L (ref 0.4–2)
LEUKOCYTE ESTERASE UR QL STRIP.AUTO: ABNORMAL
LEUKOCYTE ESTERASE UR QL STRIP.AUTO: NEGATIVE
LIPASE SERPL-CCNC: 7 U/L (ref 9–82)
LYMPHOCYTES # BLD AUTO: 0.84 X10*3/UL (ref 1.2–4.8)
LYMPHOCYTES NFR BLD AUTO: 7.2 %
MAGNESIUM SERPL-MCNC: 1.52 MG/DL (ref 1.6–2.4)
MCH RBC QN AUTO: 33 PG (ref 26–34)
MCHC RBC AUTO-ENTMCNC: 36.9 G/DL (ref 32–36)
MCV RBC AUTO: 90 FL (ref 80–100)
METHADONE UR QL SCN: ABNORMAL
MONOCYTES # BLD AUTO: 0.49 X10*3/UL (ref 0.1–1)
MONOCYTES NFR BLD AUTO: 4.2 %
NEUTROPHILS # BLD AUTO: 1.18 X10*3/UL (ref 1.2–7.7)
NEUTROPHILS NFR BLD AUTO: 9.9 %
NITRITE UR QL STRIP.AUTO: NEGATIVE
NITRITE UR QL STRIP.AUTO: NEGATIVE
NRBC BLD-RTO: 0 /100 WBCS (ref 0–0)
OPIATES UR QL SCN: ABNORMAL
OXYCODONE+OXYMORPHONE UR QL SCN: ABNORMAL
PCP UR QL SCN: ABNORMAL
PH UR STRIP.AUTO: 6.5 [PH]
PH UR STRIP.AUTO: 6.5 [PH]
PLATELET # BLD AUTO: 306 X10*3/UL (ref 150–450)
POTASSIUM SERPL-SCNC: 2.9 MMOL/L (ref 3.5–5.3)
POTASSIUM SERPL-SCNC: 4.1 MMOL/L (ref 3.5–5.3)
PROT SERPL-MCNC: 7.2 G/DL (ref 6.4–8.2)
PROT UR STRIP.AUTO-MCNC: ABNORMAL MG/DL
PROT UR STRIP.AUTO-MCNC: NEGATIVE MG/DL
RBC # BLD AUTO: 4.18 X10*6/UL (ref 4–5.2)
RBC # UR STRIP.AUTO: ABNORMAL /UL
RBC # UR STRIP.AUTO: NEGATIVE /UL
RBC #/AREA URNS AUTO: NORMAL /HPF
RBC MORPH BLD: NORMAL
SODIUM SERPL-SCNC: 117 MMOL/L (ref 136–145)
SODIUM SERPL-SCNC: 125 MMOL/L (ref 136–145)
SP GR UR STRIP.AUTO: 1
SP GR UR STRIP.AUTO: 1.02
SQUAMOUS #/AREA URNS AUTO: NORMAL /HPF
UROBILINOGEN UR STRIP.AUTO-MCNC: NORMAL MG/DL
UROBILINOGEN UR STRIP.AUTO-MCNC: NORMAL MG/DL
WBC # BLD AUTO: 11.7 X10*3/UL (ref 4.4–11.3)
WBC #/AREA URNS AUTO: NORMAL /HPF

## 2024-06-15 PROCEDURE — 85025 COMPLETE CBC W/AUTO DIFF WBC: CPT | Performed by: EMERGENCY MEDICINE

## 2024-06-15 PROCEDURE — 80307 DRUG TEST PRSMV CHEM ANLYZR: CPT | Mod: IPSPLIT | Performed by: NURSE PRACTITIONER

## 2024-06-15 PROCEDURE — 80048 BASIC METABOLIC PNL TOTAL CA: CPT | Mod: CCI,IPSPLIT | Performed by: NURSE PRACTITIONER

## 2024-06-15 PROCEDURE — 2500000004 HC RX 250 GENERAL PHARMACY W/ HCPCS (ALT 636 FOR OP/ED): Mod: IPSPLIT | Performed by: NURSE PRACTITIONER

## 2024-06-15 PROCEDURE — 2500000004 HC RX 250 GENERAL PHARMACY W/ HCPCS (ALT 636 FOR OP/ED): Mod: SE | Performed by: EMERGENCY MEDICINE

## 2024-06-15 PROCEDURE — 2500000005 HC RX 250 GENERAL PHARMACY W/O HCPCS: Mod: IPSPLIT | Performed by: NURSE PRACTITIONER

## 2024-06-15 PROCEDURE — 72125 CT NECK SPINE W/O DYE: CPT

## 2024-06-15 PROCEDURE — 72125 CT NECK SPINE W/O DYE: CPT | Performed by: RADIOLOGY

## 2024-06-15 PROCEDURE — 94760 N-INVAS EAR/PLS OXIMETRY 1: CPT | Mod: IPSPLIT

## 2024-06-15 PROCEDURE — C9113 INJ PANTOPRAZOLE SODIUM, VIA: HCPCS | Mod: SE | Performed by: EMERGENCY MEDICINE

## 2024-06-15 PROCEDURE — 83735 ASSAY OF MAGNESIUM: CPT | Performed by: EMERGENCY MEDICINE

## 2024-06-15 PROCEDURE — 2550000001 HC RX 255 CONTRASTS: Mod: SE | Performed by: EMERGENCY MEDICINE

## 2024-06-15 PROCEDURE — 83690 ASSAY OF LIPASE: CPT | Performed by: EMERGENCY MEDICINE

## 2024-06-15 PROCEDURE — 81003 URINALYSIS AUTO W/O SCOPE: CPT | Mod: IPSPLIT | Performed by: NURSE PRACTITIONER

## 2024-06-15 PROCEDURE — 2500000004 HC RX 250 GENERAL PHARMACY W/ HCPCS (ALT 636 FOR OP/ED): Mod: IPSPLIT

## 2024-06-15 PROCEDURE — 84484 ASSAY OF TROPONIN QUANT: CPT | Performed by: EMERGENCY MEDICINE

## 2024-06-15 PROCEDURE — 2500000001 HC RX 250 WO HCPCS SELF ADMINISTERED DRUGS (ALT 637 FOR MEDICARE OP): Mod: SE | Performed by: EMERGENCY MEDICINE

## 2024-06-15 PROCEDURE — 36415 COLL VENOUS BLD VENIPUNCTURE: CPT | Performed by: EMERGENCY MEDICINE

## 2024-06-15 PROCEDURE — 2020000001 HC ICU ROOM DAILY: Mod: IPSPLIT

## 2024-06-15 PROCEDURE — 2500000004 HC RX 250 GENERAL PHARMACY W/ HCPCS (ALT 636 FOR OP/ED): Mod: SE

## 2024-06-15 PROCEDURE — 80053 COMPREHEN METABOLIC PANEL: CPT | Performed by: EMERGENCY MEDICINE

## 2024-06-15 PROCEDURE — 74177 CT ABD & PELVIS W/CONTRAST: CPT | Performed by: RADIOLOGY

## 2024-06-15 PROCEDURE — 70450 CT HEAD/BRAIN W/O DYE: CPT

## 2024-06-15 PROCEDURE — 87086 URINE CULTURE/COLONY COUNT: CPT | Mod: GENLAB | Performed by: EMERGENCY MEDICINE

## 2024-06-15 PROCEDURE — 74177 CT ABD & PELVIS W/CONTRAST: CPT

## 2024-06-15 PROCEDURE — 81001 URINALYSIS AUTO W/SCOPE: CPT | Performed by: EMERGENCY MEDICINE

## 2024-06-15 PROCEDURE — 83605 ASSAY OF LACTIC ACID: CPT | Performed by: EMERGENCY MEDICINE

## 2024-06-15 PROCEDURE — 99291 CRITICAL CARE FIRST HOUR: CPT | Mod: 25 | Performed by: EMERGENCY MEDICINE

## 2024-06-15 PROCEDURE — 70450 CT HEAD/BRAIN W/O DYE: CPT | Performed by: RADIOLOGY

## 2024-06-15 PROCEDURE — 93005 ELECTROCARDIOGRAM TRACING: CPT

## 2024-06-15 PROCEDURE — 36415 COLL VENOUS BLD VENIPUNCTURE: CPT | Mod: IPSPLIT | Performed by: NURSE PRACTITIONER

## 2024-06-15 RX ORDER — HYDRALAZINE HYDROCHLORIDE 20 MG/ML
10 INJECTION INTRAMUSCULAR; INTRAVENOUS EVERY 6 HOURS PRN
Status: DISCONTINUED | OUTPATIENT
Start: 2024-06-15 | End: 2024-06-17 | Stop reason: HOSPADM

## 2024-06-15 RX ORDER — MAGNESIUM SULFATE HEPTAHYDRATE 40 MG/ML
2 INJECTION, SOLUTION INTRAVENOUS ONCE
Status: COMPLETED | OUTPATIENT
Start: 2024-06-15 | End: 2024-06-15

## 2024-06-15 RX ORDER — PROCHLORPERAZINE EDISYLATE 5 MG/ML
10 INJECTION INTRAMUSCULAR; INTRAVENOUS EVERY 6 HOURS PRN
Status: DISCONTINUED | OUTPATIENT
Start: 2024-06-15 | End: 2024-06-17 | Stop reason: HOSPADM

## 2024-06-15 RX ORDER — ONDANSETRON HYDROCHLORIDE 2 MG/ML
INJECTION, SOLUTION INTRAVENOUS
Status: COMPLETED
Start: 2024-06-15 | End: 2024-06-15

## 2024-06-15 RX ORDER — MORPHINE SULFATE 4 MG/ML
4 INJECTION, SOLUTION INTRAMUSCULAR; INTRAVENOUS ONCE
Status: COMPLETED | OUTPATIENT
Start: 2024-06-15 | End: 2024-06-15

## 2024-06-15 RX ORDER — PANTOPRAZOLE SODIUM 40 MG/10ML
40 INJECTION, POWDER, LYOPHILIZED, FOR SOLUTION INTRAVENOUS ONCE
Status: COMPLETED | OUTPATIENT
Start: 2024-06-15 | End: 2024-06-15

## 2024-06-15 RX ORDER — ONDANSETRON HYDROCHLORIDE 2 MG/ML
4 INJECTION, SOLUTION INTRAVENOUS ONCE
Status: COMPLETED | OUTPATIENT
Start: 2024-06-15 | End: 2024-06-15

## 2024-06-15 RX ORDER — PROCHLORPERAZINE 25 MG/1
25 SUPPOSITORY RECTAL EVERY 12 HOURS PRN
Status: DISCONTINUED | OUTPATIENT
Start: 2024-06-15 | End: 2024-06-15

## 2024-06-15 RX ORDER — HYDRALAZINE HYDROCHLORIDE 20 MG/ML
10 INJECTION INTRAMUSCULAR; INTRAVENOUS ONCE
Status: COMPLETED | OUTPATIENT
Start: 2024-06-15 | End: 2024-06-15

## 2024-06-15 RX ORDER — MORPHINE SULFATE 4 MG/ML
INJECTION, SOLUTION INTRAMUSCULAR; INTRAVENOUS
Status: COMPLETED
Start: 2024-06-15 | End: 2024-06-15

## 2024-06-15 RX ORDER — POLYETHYLENE GLYCOL 3350 17 G/17G
17 POWDER, FOR SOLUTION ORAL DAILY PRN
Status: DISCONTINUED | OUTPATIENT
Start: 2024-06-15 | End: 2024-06-17 | Stop reason: HOSPADM

## 2024-06-15 RX ORDER — POTASSIUM CHLORIDE 14.9 MG/ML
20 INJECTION INTRAVENOUS ONCE
Status: COMPLETED | OUTPATIENT
Start: 2024-06-15 | End: 2024-06-15

## 2024-06-15 RX ORDER — PROCHLORPERAZINE MALEATE 10 MG
10 TABLET ORAL EVERY 6 HOURS PRN
Status: DISCONTINUED | OUTPATIENT
Start: 2024-06-15 | End: 2024-06-17 | Stop reason: HOSPADM

## 2024-06-15 RX ORDER — LEVETIRACETAM 500 MG/5ML
INJECTION, SOLUTION, CONCENTRATE INTRAVENOUS
Status: COMPLETED
Start: 2024-06-15 | End: 2024-06-16

## 2024-06-15 RX ORDER — PANTOPRAZOLE SODIUM 40 MG/1
40 TABLET, DELAYED RELEASE ORAL
Status: DISCONTINUED | OUTPATIENT
Start: 2024-06-16 | End: 2024-06-16

## 2024-06-15 RX ORDER — LORAZEPAM 2 MG/ML
1 INJECTION INTRAMUSCULAR EVERY 4 HOURS
Status: DISCONTINUED | OUTPATIENT
Start: 2024-06-15 | End: 2024-06-15

## 2024-06-15 RX ORDER — SODIUM CHLORIDE 9 MG/ML
125 INJECTION, SOLUTION INTRAVENOUS CONTINUOUS
Status: DISCONTINUED | OUTPATIENT
Start: 2024-06-15 | End: 2024-06-15

## 2024-06-15 RX ORDER — LORAZEPAM 2 MG/ML
1 INJECTION INTRAMUSCULAR EVERY 4 HOURS PRN
Status: DISCONTINUED | OUTPATIENT
Start: 2024-06-15 | End: 2024-06-17 | Stop reason: HOSPADM

## 2024-06-15 RX ORDER — PROCHLORPERAZINE 25 MG/1
25 SUPPOSITORY RECTAL EVERY 12 HOURS PRN
Status: DISCONTINUED | OUTPATIENT
Start: 2024-06-15 | End: 2024-06-17 | Stop reason: HOSPADM

## 2024-06-15 RX ORDER — HYDRALAZINE HYDROCHLORIDE 20 MG/ML
10 INJECTION INTRAMUSCULAR; INTRAVENOUS EVERY 6 HOURS PRN
Status: DISCONTINUED | OUTPATIENT
Start: 2024-06-15 | End: 2024-06-15

## 2024-06-15 RX ORDER — PROCHLORPERAZINE MALEATE 10 MG
10 TABLET ORAL EVERY 6 HOURS PRN
Status: DISCONTINUED | OUTPATIENT
Start: 2024-06-15 | End: 2024-06-15

## 2024-06-15 RX ORDER — SODIUM CHLORIDE 9 MG/ML
100 INJECTION, SOLUTION INTRAVENOUS CONTINUOUS
Status: DISCONTINUED | OUTPATIENT
Start: 2024-06-15 | End: 2024-06-16

## 2024-06-15 RX ORDER — POTASSIUM CHLORIDE 14.9 MG/ML
20 INJECTION INTRAVENOUS
Status: COMPLETED | OUTPATIENT
Start: 2024-06-15 | End: 2024-06-15

## 2024-06-15 RX ORDER — LACOSAMIDE 100 MG/1
50 TABLET ORAL 2 TIMES DAILY
Status: DISCONTINUED | OUTPATIENT
Start: 2024-06-15 | End: 2024-06-16

## 2024-06-15 RX ORDER — ACETAMINOPHEN 325 MG/1
650 TABLET ORAL EVERY 4 HOURS PRN
Status: DISCONTINUED | OUTPATIENT
Start: 2024-06-15 | End: 2024-06-17 | Stop reason: HOSPADM

## 2024-06-15 RX ORDER — PROCHLORPERAZINE EDISYLATE 5 MG/ML
10 INJECTION INTRAMUSCULAR; INTRAVENOUS EVERY 6 HOURS PRN
Status: DISCONTINUED | OUTPATIENT
Start: 2024-06-15 | End: 2024-06-15

## 2024-06-15 RX ORDER — ONDANSETRON HYDROCHLORIDE 2 MG/ML
4 INJECTION, SOLUTION INTRAVENOUS EVERY 8 HOURS PRN
Status: DISCONTINUED | OUTPATIENT
Start: 2024-06-15 | End: 2024-06-17 | Stop reason: HOSPADM

## 2024-06-15 RX ORDER — DEXTROSE MONOHYDRATE 50 MG/ML
INJECTION, SOLUTION INTRAVENOUS
Status: COMPLETED
Start: 2024-06-15 | End: 2024-06-15

## 2024-06-15 SDOH — SOCIAL STABILITY: SOCIAL INSECURITY: DO YOU FEEL ANYONE HAS EXPLOITED OR TAKEN ADVANTAGE OF YOU FINANCIALLY OR OF YOUR PERSONAL PROPERTY?: NO

## 2024-06-15 SDOH — SOCIAL STABILITY: SOCIAL INSECURITY: HAVE YOU HAD ANY THOUGHTS OF HARMING ANYONE ELSE?: NO

## 2024-06-15 SDOH — SOCIAL STABILITY: SOCIAL INSECURITY: HAS ANYONE EVER THREATENED TO HURT YOUR FAMILY OR YOUR PETS?: NO

## 2024-06-15 SDOH — SOCIAL STABILITY: SOCIAL INSECURITY: ARE YOU OR HAVE YOU BEEN THREATENED OR ABUSED PHYSICALLY, EMOTIONALLY, OR SEXUALLY BY ANYONE?: NO

## 2024-06-15 SDOH — SOCIAL STABILITY: SOCIAL INSECURITY: POSSIBLE ABUSE REPORTED TO:: COUNTY SOCIAL SERVICES

## 2024-06-15 SDOH — SOCIAL STABILITY: SOCIAL INSECURITY: ABUSE: ADULT

## 2024-06-15 SDOH — SOCIAL STABILITY: SOCIAL INSECURITY: DO YOU FEEL UNSAFE GOING BACK TO THE PLACE WHERE YOU ARE LIVING?: NO

## 2024-06-15 SDOH — SOCIAL STABILITY: SOCIAL INSECURITY: WERE YOU ABLE TO COMPLETE ALL THE BEHAVIORAL HEALTH SCREENINGS?: YES

## 2024-06-15 SDOH — SOCIAL STABILITY: SOCIAL INSECURITY: ARE THERE ANY APPARENT SIGNS OF INJURIES/BEHAVIORS THAT COULD BE RELATED TO ABUSE/NEGLECT?: NO

## 2024-06-15 SDOH — SOCIAL STABILITY: SOCIAL INSECURITY: HAVE YOU HAD THOUGHTS OF HARMING ANYONE ELSE?: NO

## 2024-06-15 SDOH — SOCIAL STABILITY: SOCIAL INSECURITY: DOES ANYONE TRY TO KEEP YOU FROM HAVING/CONTACTING OTHER FRIENDS OR DOING THINGS OUTSIDE YOUR HOME?: NO

## 2024-06-15 ASSESSMENT — PAIN - FUNCTIONAL ASSESSMENT
PAIN_FUNCTIONAL_ASSESSMENT: 0-10

## 2024-06-15 ASSESSMENT — ACTIVITIES OF DAILY LIVING (ADL)
HEARING - LEFT EAR: FUNCTIONAL
ADEQUATE_TO_COMPLETE_ADL: YES
TOILETING: NEEDS ASSISTANCE
WALKS IN HOME: INDEPENDENT
JUDGMENT_ADEQUATE_SAFELY_COMPLETE_DAILY_ACTIVITIES: NO
DRESSING YOURSELF: NEEDS ASSISTANCE
PATIENT'S MEMORY ADEQUATE TO SAFELY COMPLETE DAILY ACTIVITIES?: YES
HEARING - LEFT EAR: FUNCTIONAL
HEARING - RIGHT EAR: FUNCTIONAL
LACK_OF_TRANSPORTATION: NO
FEEDING YOURSELF: INDEPENDENT
JUDGMENT_ADEQUATE_SAFELY_COMPLETE_DAILY_ACTIVITIES: NO
FEEDING YOURSELF: NEEDS ASSISTANCE
WALKS IN HOME: INDEPENDENT
DRESSING YOURSELF: NEEDS ASSISTANCE
HEARING - RIGHT EAR: FUNCTIONAL
GROOMING: NEEDS ASSISTANCE
LACK_OF_TRANSPORTATION: NO
BATHING: NEEDS ASSISTANCE
PATIENT'S MEMORY ADEQUATE TO SAFELY COMPLETE DAILY ACTIVITIES?: YES
TOILETING: NEEDS ASSISTANCE
GROOMING: NEEDS ASSISTANCE
BATHING: NEEDS ASSISTANCE

## 2024-06-15 ASSESSMENT — PAIN SCALES - GENERAL
PAINLEVEL_OUTOF10: 3
PAINLEVEL_OUTOF10: 10 - WORST POSSIBLE PAIN
PAINLEVEL_OUTOF10: 10 - WORST POSSIBLE PAIN
PAINLEVEL_OUTOF10: 0 - NO PAIN
PAINLEVEL_OUTOF10: 3
PAINLEVEL_OUTOF10: 10 - WORST POSSIBLE PAIN
PAINLEVEL_OUTOF10: 3
PAINLEVEL_OUTOF10: 0 - NO PAIN
PAINLEVEL_OUTOF10: 0 - NO PAIN

## 2024-06-15 ASSESSMENT — LIFESTYLE VARIABLES
PRESCIPTION_ABUSE_PAST_12_MONTHS: NO
AUDIT-C TOTAL SCORE: 1
HOW MANY STANDARD DRINKS CONTAINING ALCOHOL DO YOU HAVE ON A TYPICAL DAY: PATIENT DOES NOT DRINK
AUDIT-C TOTAL SCORE: 1
SKIP TO QUESTIONS 9-10: 1
HOW MANY STANDARD DRINKS CONTAINING ALCOHOL DO YOU HAVE ON A TYPICAL DAY: PATIENT DOES NOT DRINK
AUDIT-C TOTAL SCORE: 1
HOW OFTEN DO YOU HAVE A DRINK CONTAINING ALCOHOL: MONTHLY OR LESS
PRESCIPTION_ABUSE_PAST_12_MONTHS: NO
HOW OFTEN DO YOU HAVE 6 OR MORE DRINKS ON ONE OCCASION: NEVER
SUBSTANCE_ABUSE_PAST_12_MONTHS: NO
HOW OFTEN DO YOU HAVE A DRINK CONTAINING ALCOHOL: MONTHLY OR LESS
HOW OFTEN DO YOU HAVE 6 OR MORE DRINKS ON ONE OCCASION: NEVER
SKIP TO QUESTIONS 9-10: 1
AUDIT-C TOTAL SCORE: 1
SUBSTANCE_ABUSE_PAST_12_MONTHS: NO

## 2024-06-15 ASSESSMENT — COGNITIVE AND FUNCTIONAL STATUS - GENERAL
PATIENT BASELINE BEDBOUND: NO
MOBILITY SCORE: 24
DAILY ACTIVITIY SCORE: 24

## 2024-06-15 ASSESSMENT — PAIN DESCRIPTION - LOCATION
LOCATION: ABDOMEN
LOCATION: ABDOMEN
LOCATION: HEAD
LOCATION_2: HEAD

## 2024-06-15 ASSESSMENT — ENCOUNTER SYMPTOMS
NAUSEA: 1
CONSTITUTIONAL NEGATIVE: 1
ALLERGIC/IMMUNOLOGIC NEGATIVE: 1
DIARRHEA: 1
MUSCULOSKELETAL NEGATIVE: 1
SEIZURES: 1
HEMATOLOGIC/LYMPHATIC NEGATIVE: 1
ABDOMINAL PAIN: 1
ENDOCRINE NEGATIVE: 1
PSYCHIATRIC NEGATIVE: 1
VOMITING: 1

## 2024-06-15 ASSESSMENT — COLUMBIA-SUICIDE SEVERITY RATING SCALE - C-SSRS

## 2024-06-15 ASSESSMENT — PAIN DESCRIPTION - DESCRIPTORS: DESCRIPTORS_2: ACHING

## 2024-06-15 ASSESSMENT — PAIN DESCRIPTION - PAIN TYPE: TYPE: ACUTE PAIN

## 2024-06-15 ASSESSMENT — PATIENT HEALTH QUESTIONNAIRE - PHQ9
1. LITTLE INTEREST OR PLEASURE IN DOING THINGS: NOT AT ALL
1. LITTLE INTEREST OR PLEASURE IN DOING THINGS: NOT AT ALL
SUM OF ALL RESPONSES TO PHQ9 QUESTIONS 1 & 2: 0
2. FEELING DOWN, DEPRESSED OR HOPELESS: NOT AT ALL
SUM OF ALL RESPONSES TO PHQ9 QUESTIONS 1 & 2: 0
2. FEELING DOWN, DEPRESSED OR HOPELESS: NOT AT ALL

## 2024-06-15 NOTE — H&P
History Of Present Illness  Erica Machado is a 58 y.o. female with a past medical history of seizures, HTN, anxiety, COPD, GERD, HLD, hypothyroid, OA, schizoaffective disorder presenting with vomiting (12 episodes past 24 hrs) and LUQ abd pain, diarrhea (6-8x's)  x 3 days. She also reports a seizure 6/14/24 during which she fell and struck her head on a glass table. Pt states she averages 2-3 seizures/wk normally and she always loses consciousness and is incontinent. Pt denies fever or chills, change in medication regimen, congestion or sore throat, chest pain or sob, blood in stool or emesis. She denies neck pain or syncope apart from her seizures and she denies changes to her mental health.     ED testing: Gluc 144, Na+ 117, K+2.9, Cl 82, BUN 3 Cr 0.56, GFR > 90, Leuks 11.7, plt 306, trop 6, lactate 1.6  CT abd:Mild prominence of the walls of the colon as well as a few distal  small bowel loops may relate to incomplete distention but entero  colitis can not be excluded. Colonic diverticulosis without evidence  of diverticulitis  CT head and C-spine - no acute abnormality    ED treatment: 1LNS -  , Zofran, protonix, hydralazine 10mg, Kcl 20 meq     Past Medical History  She has a past medical history of Anxiety, COPD (chronic obstructive pulmonary disease) (Multi), Diverticulosis, GERD (gastroesophageal reflux disease), HLD (hyperlipidemia), Hypertension, Hypothyroidism, Insomnia, Migraine, Osteoarthritis, Other specified anxiety disorders (01/03/2022), Schizoaffective disorder (Multi), and Seizure (Multi).    Surgical History  She has a past surgical history that includes Foot surgery (10/13/2014); Endometrial ablation; and Foot surgery.     Social History  She reports that she has been smoking cigarettes. She has never been exposed to tobacco smoke. She has never used smokeless tobacco. She reports that she does not drink alcohol and does not use drugs.    Family History  Family History   Problem  Relation Name Age of Onset    Diabetes Mother      Emphysema Mother      Emphysema Father          Allergies  Penicillins and Sulfa (sulfonamide antibiotics)    Review of Systems   Constitutional: Negative.    Gastrointestinal:  Positive for abdominal pain, diarrhea, nausea and vomiting.        LUQ x 3 days  6-8 bouts diarrhea/day urgency and completely liquid - no blood observed   Endocrine: Negative.    Genitourinary: Negative.    Musculoskeletal: Negative.    Skin: Negative.    Allergic/Immunologic: Negative.    Neurological:  Positive for seizures.        States she had a seizure yesterday 6/14 - she hit her head on a glass table CT head and Cspine (-)  Usual is 2-3 seizures /wk - always with loss of consciousness and incontinence   Hematological: Negative.    Psychiatric/Behavioral: Negative.          Physical Exam  Constitutional:       Comments: Flat affect and pill rolling type motions of her mouth   HENT:      Head: Normocephalic.      Mouth/Throat:      Mouth: Mucous membranes are moist.   Eyes:      Extraocular Movements: Extraocular movements intact.   Cardiovascular:      Rate and Rhythm: Regular rhythm. Tachycardia present.      Pulses: Normal pulses.      Heart sounds: Normal heart sounds.   Pulmonary:      Effort: Pulmonary effort is normal.      Breath sounds: Normal breath sounds.   Abdominal:      General: Bowel sounds are normal.      Palpations: Abdomen is soft.      Comments: Tenderness to LUQ with deep palpation    Musculoskeletal:         General: Normal range of motion.      Cervical back: Normal range of motion.   Skin:     General: Skin is warm and dry.      Capillary Refill: Capillary refill takes less than 2 seconds.   Neurological:      General: No focal deficit present.      Mental Status: She is alert and oriented to person, place, and time.   Psychiatric:         Mood and Affect: Mood normal.         Behavior: Behavior normal.      Comments: Flat affect           Last Recorded  Vitals  BP (!) 184/99   Pulse 86   Temp 36.6 °C (97.9 °F) (Oral)   Resp 17   Wt 86.6 kg (191 lb)   SpO2 99%     Relevant Results        .meds  Results for orders placed or performed during the hospital encounter of 06/15/24 (from the past 24 hour(s))   CBC and Auto Differential   Result Value Ref Range    WBC 11.7 (H) 4.4 - 11.3 x10*3/uL    nRBC 0.0 0.0 - 0.0 /100 WBCs    RBC 4.18 4.00 - 5.20 x10*6/uL    Hemoglobin 13.8 12.0 - 16.0 g/dL    Hematocrit 37.4 36.0 - 46.0 %    MCV 90 80 - 100 fL    MCH 33.0 26.0 - 34.0 pg    MCHC 36.9 (H) 32.0 - 36.0 g/dL    RDW 13.1 11.5 - 14.5 %    Platelets 306 150 - 450 x10*3/uL    Neutrophils % 9.9 40.0 - 80.0 %    Immature Granulocytes %, Automated 0.9 0.0 - 0.9 %    Lymphocytes % 7.2 13.0 - 44.0 %    Monocytes % 4.2 2.0 - 10.0 %    Eosinophils % 77.5 0.0 - 6.0 %    Basophils % 0.3 0.0 - 2.0 %    Neutrophils Absolute 1.18 (L) 1.20 - 7.70 x10*3/uL    Immature Granulocytes Absolute, Automated 0.10 0.00 - 0.70 x10*3/uL    Lymphocytes Absolute 0.84 (L) 1.20 - 4.80 x10*3/uL    Monocytes Absolute 0.49 0.10 - 1.00 x10*3/uL    Eosinophils Absolute 9.07 (H) 0.00 - 0.70 x10*3/uL    Basophils Absolute 0.03 0.00 - 0.10 x10*3/uL   Magnesium   Result Value Ref Range    Magnesium 1.52 (L) 1.60 - 2.40 mg/dL   Comprehensive metabolic panel   Result Value Ref Range    Glucose 144 (H) 74 - 99 mg/dL    Sodium 117 (LL) 136 - 145 mmol/L    Potassium 2.9 (LL) 3.5 - 5.3 mmol/L    Chloride 82 (L) 98 - 107 mmol/L    Bicarbonate 23 21 - 32 mmol/L    Anion Gap 15 10 - 20 mmol/L    Urea Nitrogen 3 (L) 6 - 23 mg/dL    Creatinine 0.56 0.50 - 1.05 mg/dL    eGFR >90 >60 mL/min/1.73m*2    Calcium 9.2 8.6 - 10.3 mg/dL    Albumin 4.7 3.4 - 5.0 g/dL    Alkaline Phosphatase 55 33 - 110 U/L    Total Protein 7.2 6.4 - 8.2 g/dL    AST 33 9 - 39 U/L    Bilirubin, Total 0.8 0.0 - 1.2 mg/dL    ALT 22 7 - 45 U/L   Lipase   Result Value Ref Range    Lipase 7 (L) 9 - 82 U/L   Lactate   Result Value Ref Range    Lactate 1.6  0.4 - 2.0 mmol/L   Troponin I, High Sensitivity   Result Value Ref Range    Troponin I, High Sensitivity 6 0 - 13 ng/L   Morphology   Result Value Ref Range    RBC Morphology No significant RBC morphology present      CT abdomen pelvis w IV contrast    Result Date: 6/15/2024  Interpreted By:  Magdy Jules, STUDY: CT ABDOMEN PELVIS W IV CONTRAST;  6/15/2024 12:36 pm   INDICATION: Signs/Symptoms:abdominal pain, nausea and vomiting.   COMPARISON: 09/16/2023   ACCESSION NUMBER(S): [id number]   ORDERING CLINICIAN: ROSALBA VILLA   TECHNIQUE: Contiguous axial images were obtained through the abdomen and pelvis following the intravenous administration of 75 cc of Omnipaque 350. Coronal and sagittal reconstructions were performed.   FINDINGS: LOWER CHEST: Images through the lung bases  demonstrate mild areas of atelectasis and/or scarring. Mild prominence of the walls of the visible portions of the distal esophagus may relate to incomplete distention but esophagitis is not excluded.   ABDOMEN AND PELVIS:   LIVER: Decreased attenuation throughout the liver relative to the spleen could relate to fatty infiltration or possibly artifactual from incomplete IV contrast equilibration.   BILE DUCTS: Not abnormally dilated.   GALLBLADDER: No calcified stones. No wall thickening.   PANCREAS: Appears unremarkable.   SPLEEN: Appears unremarkable.   ADRENAL GLANDS: Appear unremarkable.   KIDNEYS, URETERS, AND BLADDER: The kidneys enhance with contrast material symmetrically. There is no evidence of hydronephrosis.  The urinary bladder appears grossly unremarkable.   BOWEL: Mild prominence of the walls of the colon as well as a few distal ileal bowel loops, including the terminal ileum, may relate to incomplete distention but entero colitis is also possible. There is no evidence of a bowel obstruction.  Appendix is not confidently identified. Although CT has limited sensitivity and specificity for gastric pathology, the stomach appears  grossly unremarkable.   RETROPERITONEUM, VESSELS: There is no aneurysmal dilatation of the abdominal aorta. The IVC is within normal limits.  There are atherosclerotic calcifications of the aorta and its branches. No pathologically enlarged retroperitoneal lymph nodes are noted.   PERITONEUM: There is no evidence of pneumoperitoneum.  No ascites or loculated fluid collection noted. Uterus is normal in size. No pathologically enlarged mesenteric lymph nodes are identified.   ABDOMINAL WALL, SOFT TISSUES: No acute process.   SKELETON: Degenerative changes. No acute process.       Mild prominence of the walls of the colon as well as a few distal small bowel loops may relate to incomplete distention but entero colitis can not be excluded. Colonic diverticulosis without evidence of diverticulitis.   MACRO: None.   Signed by: Magdy Jules 6/15/2024 12:54 PM Dictation workstation:   [id number]    CT head wo IV contrast    Result Date: 6/15/2024  Interpreted By:  Michel Delaney, STUDY: CT HEAD WO IV CONTRAST; CT CERVICAL SPINE WO IV CONTRAST;  6/15/2024 12:28 pm   INDICATION: Trauma. Signs/Symptoms:fall hit head; Signs/Symptoms:fall/head injury.   COMPARISON: April 10, 2024 head CT. December 12, 2020 CT head and cervical spine examinations.   ACCESSION NUMBER(S): [id number]; [id number]   ORDERING CLINICIAN: ROSALBA VILLA   TECHNIQUE: Axial noncontrast head and cervical spine CT   FINDINGS: Head:   Parenchyma: The grey-white differentiation is intact. There is no mass effect or midline shift.  There is no intracranial hemorrhage.   CSF Spaces: The ventricles, sulci and basal cisterns are within normal limits for patient's age. There is no extraaxial fluid collection.   Calvarium: No evidence of fracture was identified.   Paranasal sinuses and mastoids: Visualized paranasal sinuses and mastoids are clear.   Cervical spine:   Alignment: Similar without interval traumatic subluxation.   Cranial cervical junction: Intact.    Fracture: No acute fracture.   Vertebra and intervertebral disc spaces: Similar prominent multilevel facet arthrosis and mild discogenic degenerative changes without evident significant canal narrowing.   Paravertebral soft tissues: No hematoma. Trace arterial vascular calcifications.   Brain Injury (BIG) guidelines CT values:   Skull fracture: No SDH (subdural hematoma): None detected EDH (epidural hemtoma): None detected IPH (intraparenchymal hemorrhage): None detected SAH (subarachnoid hemorrhage): None detected IVH (intraventricular hemorrhage): No   Reference: Juanito HAYNES, Chelsea RS, Venkata M, et al. The BIG (brain injury guidelines) project: defining the management of traumatic brain injury by acute care surgeons. J Trauma Acute Care Surg. 2014;76:694o647.       Head:   No acute intracranial abnormality was identified.   No fracture.   Cervical spine: No fracture or subluxation.   Similar degenerative changes.   Trace arterial vascular calcifications.   MACRO: None     Signed by: Michel Delaney 6/15/2024 12:41 PM Dictation workstation:   [id number]    CT cervical spine wo IV contrast    Result Date: 6/15/2024  Interpreted By:  Michel Delaney, STUDY: CT HEAD WO IV CONTRAST; CT CERVICAL SPINE WO IV CONTRAST;  6/15/2024 12:28 pm   INDICATION: Trauma. Signs/Symptoms:fall hit head; Signs/Symptoms:fall/head injury.   COMPARISON: April 10, 2024 head CT. December 12, 2020 CT head and cervical spine examinations.   ACCESSION NUMBER(S): [id number]; [id number]   ORDERING CLINICIAN: ROSALBA VILLA   TECHNIQUE: Axial noncontrast head and cervical spine CT   FINDINGS: Head:   Parenchyma: The grey-white differentiation is intact. There is no mass effect or midline shift.  There is no intracranial hemorrhage.   CSF Spaces: The ventricles, sulci and basal cisterns are within normal limits for patient's age. There is no extraaxial fluid collection.   Calvarium: No evidence of fracture was identified.   Paranasal sinuses  and mastoids: Visualized paranasal sinuses and mastoids are clear.   Cervical spine:   Alignment: Similar without interval traumatic subluxation.   Cranial cervical junction: Intact.   Fracture: No acute fracture.   Vertebra and intervertebral disc spaces: Similar prominent multilevel facet arthrosis and mild discogenic degenerative changes without evident significant canal narrowing.   Paravertebral soft tissues: No hematoma. Trace arterial vascular calcifications.   Brain Injury (BIG) guidelines CT values:   Skull fracture: No SDH (subdural hematoma): None detected EDH (epidural hemtoma): None detected IPH (intraparenchymal hemorrhage): None detected SAH (subarachnoid hemorrhage): None detected IVH (intraventricular hemorrhage): No   Reference: Juanito HAYNES, Chelsea RS, Venkata M, et al. The BIG (brain injury guidelines) project: defining the management of traumatic brain injury by acute care surgeons. J Trauma Acute Care Surg. 2014;76:153l960.       Head:   No acute intracranial abnormality was identified.   No fracture.   Cervical spine: No fracture or subluxation.   Similar degenerative changes.   Trace arterial vascular calcifications.   MACRO: None     Signed by: Michel Delaney 6/15/2024 12:41 PM Dictation workstation:   [id number]        Assessment/Plan   Principal Problem:    Hyponatremia    Hyponatremia  Hyperemesis  Diarrhea  - zofran, phenergan, compazine prn nausea  - protonix  - stool pathogen PCR and cdiff cx's  - UDS, UA with reflex to culture  - 1L NS in ED  NS @125cc's/hr - more fluid as appropriate   - Replete K+ to 4.0 and Mg+ to 2.0  - CT abd: Mild prominence of the walls of the colon as well as a few distal  small bowel loops may relate to incomplete distention but entero  colitis can not be excluded. Colonic diverticulosis without evidence  of diverticulitis  - leuks 11.7, afebrile     Seizures   - admitted 4/10/24 with seizures after running out of seizure medications at home   - by hx - pt has  "2-3 seizures per usual wk and always loses consciousness and is incontinent - her warning is the smell of \"burnt rubber\"  - follows with Ronaldo Garcia regarding her seizures   - continue vimpat, lamictal - IV until po tolerated   - seizure precautions    HTN  - continue lisinopril if po intake sufficient  - hydralazine prn SBP > 160     COPD  - continue dulera    Hypothyroid  - continue synthroid     GERD  - protonix    Schizoaffective disorder/major depressive disorder/BiPolar disorder  - continue cogentin, buspar, haldol, remeron, trazadone, viibryd     Case discussed and reviewed with Dr. Blackburn    Total accumulated time spent face to face and not face to face preparing to see the patient, obtaining and reviewing separately obtained history; performing a medically appropriate examination and/or evaluation; counseling and educating the patient, family; ordering medications, tests, or procedures; referring and communicating with other health care professionals; documenting clinical information in the patient's medical record; independently interpreting results and communicating the results to the patient, family; and care coordination was 45 minutes    MIKKI Saenz-CNP    "

## 2024-06-15 NOTE — ED PROVIDER NOTES
Department of Emergency Medicine   ED  Provider Note  Admit Date/RoomTime: 6/15/2024 11:21 AM  ED Room: AC09/AC09                  History of Present Illness:   Erica Machado is a 58 y.o. female presenting to the ED for abdominal pain, nausea and vomiting, high blood pressure, seizure, beginning 3 days ago.  The complaint has been intermittent, moderate in severity, and worsened by nothing.  Patient complains of epigastric and left upper quadrant abdominal discomfort associated with nausea vomiting and diarrhea over the last 3 days.  She has been able to keep her blood pressure medicine down (Lisinopril) and unable to keep her seizure medication down (VIMPAT) d/t vomiting.  States she has had 12 episodes of vomiting last 24 hours.  She does have a about 6-8 episodes of diarrhea.  No black tarry or bloody stools.  No hematemesis or hematochezia.  She states she think she might have had a seizure yesterday and she fell striking her head.  She has had no prior abdominal surgeries except had bilateral tubal ligation in the remote past.   She has had similar episodes like this in the past but not this severe.  No fever or chills.  No dysuria urgency or frequency.      Review of Systems:   Pertinent positives and review of systems as noted above.  Remaining 10 review of systems is negative or noncontributory to today's episode of care.  Review of Systems   A complete review of systems is otherwise negative except as noted above    --------------------------------------------- PAST HISTORY ---------------------------------------------  Past Medical History:  has a past medical history of Anxiety, COPD (chronic obstructive pulmonary disease) (Multi), Diverticulosis, GERD (gastroesophageal reflux disease), HLD (hyperlipidemia), Hypertension, Hypothyroidism, Insomnia, Migraine, Osteoarthritis, Other specified anxiety disorders (01/03/2022), Schizoaffective disorder (Multi), and Seizure (Multi).    Past Surgical  History:  has a past surgical history that includes Foot surgery (10/13/2014); Endometrial ablation; and Foot surgery.    Social History:  reports that she has been smoking cigarettes. She has never been exposed to tobacco smoke. She has never used smokeless tobacco. She reports that she does not drink alcohol and does not use drugs.    Family History: family history includes Diabetes in her mother; Emphysema in her father and mother. Unless otherwise noted, family history is non contributory    Patient's Medications   New Prescriptions    No medications on file   Previous Medications    ALBUTEROL 108 (90 BASE) MCG/ACT INHALER    2 puffs every 4 hours.    ATORVASTATIN (LIPITOR) 80 MG TABLET    Take 1 tablet (80 mg) by mouth once daily at bedtime. Has not taken for approx 1 month    BENZTROPINE (COGENTIN) 0.5 MG TABLET    Take 1 tablet (0.5 mg) by mouth twice a day.    BUSPIRONE (BUSPAR) 15 MG TABLET    Take 1 tablet (15 mg) by mouth 2 times a day.    CHOLECALCIFEROL (VITAMIN D-3) 50 MCG (2000 UT) TABLET    Take 1 tablet (2,000 Units) by mouth once daily.    CHOLESTYRAMINE LIGHT (PREVALITE) 4 GRAM PACKET    Take 1 packet (4 g) by mouth 2 times a day.    CLENPIQ 10 MG-3.5 GRAM- 12 GRAM/175 ML SOLUTION    Take 2 bottles by mouth if needed (Kit prep take one bottle at 6pm night before procedure and take second bottle at 4 am day of procedure).    CYANOCOBALAMIN (VITAMIN B-12) 100 MCG TABLET    Take 1 tablet (100 mcg) by mouth once daily.    DIPHENOXYLATE-ATROPINE (LOMOTIL) 2.5-0.025 MG TABLET    Take 1 tablet by mouth 3 times a day as needed for diarrhea.    FENOFIBRATE (TRIGLIDE) 160 MG TABLET    Take 1 tablet (160 mg) by mouth once daily.    FLUTICASONE (FLONASE) 50 MCG/ACTUATION NASAL SPRAY    Administer 1 spray into each nostril once daily.    GABAPENTIN (NEURONTIN) 300 MG CAPSULE    Take 1 capsule (300 mg) by mouth 3 times a day.    HALOPERIDOL DECANOATE (HALDOL DECANOATE) 50 MG/ML INJECTION    Inject 1.5 mL (75  mg) into the muscle every 28 (twenty-eight) days.    LACOSAMIDE (VIMPAT) 50 MG TABLET    Take 1 tablet (50 mg) by mouth 2 times a day.    LAMOTRIGINE (LAMICTAL) 25 MG TABLET    Take 3 tablets (75 mg) by mouth once daily.    LEVOTHYROXINE (SYNTHROID, LEVOXYL) 150 MCG TABLET    Take 1 tablet (150 mcg) by mouth early in the morning..    LISINOPRIL 40 MG TABLET    Take 1 tablet (40 mg) by mouth once daily.    LORATADINE (CLARITIN) 10 MG TABLET    Take 1 tablet (10 mg) by mouth once daily as needed for allergies.    MIRTAZAPINE (REMERON) 15 MG TABLET    Take 1 tablet (15 mg) by mouth once daily at bedtime.    MOMETASONE-FORMOTEROL (DULERA) 200-5 MCG/ACTUATION INHALER    Inhale 2 puffs 2 times a day.    OMEGA-3 1,000 MG CAPSULE CAPSULE    Take 1 capsule (1,000 mg) by mouth 2 times a day.    PANTOPRAZOLE (PROTONIX) 40 MG EC TABLET    Take 1 tablet (40 mg) by mouth once daily in the morning. Take before meals.    PRAZOSIN (MINIPRESS) 1 MG CAPSULE    Take 1 capsule (1 mg) by mouth as needed at bedtime (PTSD, flashbacks).    PROMETHAZINE (PHENERGAN) 12.5 MG TABLET    Take 1 tablet (12.5 mg) by mouth every 6 hours if needed for nausea or vomiting.    RIMEGEPANT (NURTEC ODT) 75 MG TABLET,DISINTEGRATING    Take 1 tablet (75 mg) by mouth once daily as needed (migraine).    THIAMINE (VITAMIN B-1) 100 MG TABLET    Take 1 tablet (100 mg) by mouth once daily.    TIZANIDINE (ZANAFLEX) 4 MG TABLET    Take 1 tablet (4 mg) by mouth every 8 hours if needed for muscle spasms.    TRAZODONE (DESYREL) 300 MG TABLET    Take 1 tablet (300 mg) by mouth once daily at bedtime.    UBROGEPANT (UBRELVY) 100 MG TABLET TABLET    Take 1 tablet (100 mg) by mouth if needed (migraine).    VILAZODONE (VIIBRYD) 40 MG TABLET    Take 1 tablet (40 mg) by mouth once daily with breakfast.    ZOLPIDEM (AMBIEN) 10 MG TABLET    Take 1 tablet (10 mg) by mouth as needed at bedtime for sleep.   Modified Medications    No medications on file   Discontinued Medications     No medications on file      The patient’s home medications have been reviewed.    Allergies: Penicillins and Sulfa (sulfonamide antibiotics)    -------------------------------------------------- RESULTS -------------------------------------------------  All laboratory and radiology results have been personally reviewed by myself   LABS:  Labs Reviewed   CBC WITH AUTO DIFFERENTIAL - Abnormal       Result Value    WBC 11.7 (*)     nRBC 0.0      RBC 4.18      Hemoglobin 13.8      Hematocrit 37.4      MCV 90      MCH 33.0      MCHC 36.9 (*)     RDW 13.1      Platelets 306      Neutrophils % 9.9      Immature Granulocytes %, Automated 0.9      Lymphocytes % 7.2      Monocytes % 4.2      Eosinophils % 77.5      Basophils % 0.3      Neutrophils Absolute 1.18 (*)     Immature Granulocytes Absolute, Automated 0.10      Lymphocytes Absolute 0.84 (*)     Monocytes Absolute 0.49      Eosinophils Absolute 9.07 (*)     Basophils Absolute 0.03     MAGNESIUM - Abnormal    Magnesium 1.52 (*)    COMPREHENSIVE METABOLIC PANEL - Abnormal    Glucose 144 (*)     Sodium 117 (*)     Potassium 2.9 (*)     Chloride 82 (*)     Bicarbonate 23      Anion Gap 15      Urea Nitrogen 3 (*)     Creatinine 0.56      eGFR >90      Calcium 9.2      Albumin 4.7      Alkaline Phosphatase 55      Total Protein 7.2      AST 33      Bilirubin, Total 0.8      ALT 22     LIPASE - Abnormal    Lipase 7 (*)     Narrative:     Venipuncture immediately after or during the administration of Metamizole may lead to falsely low results. Testing should be performed immediately prior to Metamizole dosing.   LACTATE - Normal    Lactate 1.6      Narrative:     Venipuncture immediately after or during the administration of Metamizole may lead to falsely low results. Testing should be performed immediately  prior to Metamizole dosing.   TROPONIN I, HIGH SENSITIVITY - Normal    Troponin I, High Sensitivity 6      Narrative:     Less than 99th percentile of normal range  cutoff-  Female and children under 18 years old <14 ng/L; Male <21 ng/L: Negative  Repeat testing should be performed if clinically indicated.     Female and children under 18 years old 14-50 ng/L; Male 21-50 ng/L:  Consistent with possible cardiac damage and possible increased clinical   risk. Serial measurements may help to assess extent of myocardial damage.     >50 ng/L: Consistent with cardiac damage, increased clinical risk and  myocardial infarction. Serial measurements may help assess extent of   myocardial damage.      NOTE: Children less than 1 year old may have higher baseline troponin   levels and results should be interpreted in conjunction with the overall   clinical context.     NOTE: Troponin I testing is performed using a different   testing methodology at [facility] than at other   [facility]. Direct result comparisons should only   be made within the same method.   URINALYSIS WITH REFLEX CULTURE AND MICROSCOPIC    Narrative:     The following orders were created for panel order Urinalysis with Reflex Culture and Microscopic.  Procedure                               Abnormality         Status                     ---------                               -----------         ------                     Urinalysis with Reflex C...[[id number]]                                                 Extra Urine Gray Tube[[id number]]                                                         Please view results for these tests on the individual orders.   URINALYSIS WITH REFLEX CULTURE AND MICROSCOPIC   EXTRA URINE GRAY TUBE   MORPHOLOGY    RBC Morphology No significant RBC morphology present           RADIOLOGY:  Interpreted by Radiologist.  CT abdomen pelvis w IV contrast   Final Result   Mild prominence of the walls of the colon as well as a few distal   small bowel loops may relate to incomplete distention but entero   colitis can not be excluded. Colonic diverticulosis without evidence   of  "diverticulitis.        MACRO:   None.        Signed by: Magdy Jules 6/15/2024 12:54 PM   Dictation workstation:   [id number]      CT head wo IV contrast   Final Result   Head:        No acute intracranial abnormality was identified.        No fracture.        Cervical spine: No fracture or subluxation.        Similar degenerative changes.        Trace arterial vascular calcifications.        MACRO:   None             Signed by: Michel Delaney 6/15/2024 12:41 PM   Dictation workstation:   [id number]      CT cervical spine wo IV contrast   Final Result   Head:        No acute intracranial abnormality was identified.        No fracture.        Cervical spine: No fracture or subluxation.        Similar degenerative changes.        Trace arterial vascular calcifications.        MACRO:   None             Signed by: Michel Delaney 6/15/2024 12:41 PM   Dictation workstation:   [id number]          Encounter Date: 04/10/24   Electrocardiogram, 12-lead PRN ACS symptoms   Result Value    Ventricular Rate 44    Atrial Rate 44    PA Interval 238    QRS Duration 90    QT Interval 574    QTC Calculation(Bazett) 490    P Axis 67    R Axis 41    T Axis 61    QRS Count 7    Q Onset 225    P Onset 106    P Offset 167    T Offset 512    QTC Fredericia 517    Narrative    Marked sinus bradycardia with 1st degree AV block  Prolonged QT  Abnormal ECG  When compared with ECG of 10-APR-2024 16:36,  Incomplete right bundle branch block is no longer Present  Confirmed by Kortney Adames ([id number]) on 4/14/2024 4:57:46 PM     ------------------------- NURSING NOTES AND VITALS REVIEWED ---------------------------   The nursing notes within the ED encounter and vital signs as below have been reviewed.   BP (!) 194/102 (BP Location: Left arm, Patient Position: Sitting)   Pulse 77   Temp 36.6 °C (97.9 °F) (Oral)   Resp 16   Ht 1.727 m (5' 8\")   Wt 86.6 kg (191 lb)   SpO2 98%   BMI 29.04 kg/m²   Oxygen Saturation Interpretation: " Normal      ---------------------------------------------------PHYSICAL EXAM--------------------------------------  Physical Exam  Vitals and nursing note reviewed.   Constitutional:       General: She is not in acute distress.     Appearance: She is well-developed. She is not ill-appearing or toxic-appearing.   HENT:      Head: Normocephalic and atraumatic.   Eyes:      General: No scleral icterus.     Extraocular Movements: Extraocular movements intact.      Conjunctiva/sclera: Conjunctivae normal.      Pupils: Pupils are equal, round, and reactive to light.   Cardiovascular:      Rate and Rhythm: Normal rate and regular rhythm.      Heart sounds: Normal heart sounds. No murmur heard.     No friction rub. No gallop.   Pulmonary:      Effort: Pulmonary effort is normal. No respiratory distress.      Breath sounds: Normal breath sounds. No wheezing, rhonchi or rales.   Abdominal:      General: Abdomen is protuberant. Bowel sounds are decreased. There is no distension or abdominal bruit. There are no signs of injury.      Palpations: Abdomen is soft. There is no shifting dullness, fluid wave, hepatomegaly, splenomegaly, mass or pulsatile mass.      Tenderness: There is generalized abdominal tenderness and tenderness in the epigastric area and left upper quadrant. There is no right CVA tenderness, left CVA tenderness, guarding or rebound. Negative signs include Betts's sign, Rovsing's sign, McBurney's sign and psoas sign.      Hernia: No hernia is present.   Musculoskeletal:         General: No swelling.      Cervical back: Neck supple.   Skin:     General: Skin is warm and dry.      Capillary Refill: Capillary refill takes less than 2 seconds.      Findings: No rash.   Neurological:      Mental Status: She is alert.   Psychiatric:         Mood and Affect: Mood normal.            Critical Care    Performed by: Ronaldo Garcia DO  Authorized by: Ronaldo Garcia DO    Critical care provider statement:     Critical  care time (minutes):  40    Critical care time was exclusive of:  Separately billable procedures and treating other patients    Critical care was necessary to treat or prevent imminent or life-threatening deterioration of the following conditions:  Metabolic crisis    Critical care was time spent personally by me on the following activities:  Blood draw for specimens, development of treatment plan with patient or surrogate, evaluation of patient's response to treatment, examination of patient, obtaining history from patient or surrogate, ordering and performing treatments and interventions, ordering and review of laboratory studies, ordering and review of radiographic studies, pulse oximetry, re-evaluation of patient's condition and review of old charts    Care discussed with: admitting provider      NONE  ------------------------------ ED COURSE/MEDICAL DECISION MAKING----------------------    Medical Decision Making:   Patient seen and examined by me.  An IV is started.  Appropriate labs been ordered.  IV Zofran 4 mg, IV Protonix 40 mg.  IV fluid bolus 1000 mL was ordered.  IV hydralazine 10 mg ordered for blood pressure.     White count 1.7, hemoglobin 13.8 hematocrit 37.4, platelet count 308, no shift  Sodium was 117, potassium 2.9, chloride 82, bicarb 23, anion gap 15  The patient was given 1 L of normal saline and is on normal saline at 125 cc/h.  IV potassium 20 mill equivalents has been ordered over 2 hours.  Troponin is normal.  Lactate is normal.  CT imaging of the abdomen pelvis reveals no evidence of acute obstruction.  Possible enterocolitis.  CT imaging of the head and C-spine is unremarkable.    I discussed the case with the ICU hospitalist PAT Segovia  Patient is excepted for admission to the ICU under the service of Dr. Mcpherson      IMPRESSION  1. Generalized abdominal pain    2. Nausea and vomiting, unspecified vomiting type    3. Hyponatremia    4. Hypokalemia        DISPOSITION  Disposition:  Admit to CCU/ICU  Patient condition is fair      Billing Provider Critical Care Time: 40 minutes     Ronaldo Garcia,   06/15/24 [id number]

## 2024-06-15 NOTE — ED TRIAGE NOTES
She has been having abdominal pain and vomiting for 3 days. Yesterday she had a seizure and fell and hit her head on a glass table . No LOC per witness. She has a bruise on her left forearm from the fall. Not able to keep down her medications.

## 2024-06-16 LAB
ALBUMIN SERPL BCP-MCNC: 4.6 G/DL (ref 3.4–5)
ALP SERPL-CCNC: 45 U/L (ref 33–110)
ALT SERPL W P-5'-P-CCNC: 21 U/L (ref 7–45)
AMYLASE SERPL-CCNC: 50 U/L (ref 29–103)
ANION GAP SERPL CALC-SCNC: 12 MMOL/L (ref 10–20)
ANION GAP SERPL CALC-SCNC: 12 MMOL/L (ref 10–20)
ANION GAP SERPL CALC-SCNC: 14 MMOL/L (ref 10–20)
AST SERPL W P-5'-P-CCNC: 31 U/L (ref 9–39)
BILIRUB SERPL-MCNC: 0.6 MG/DL (ref 0–1.2)
BUN SERPL-MCNC: 4 MG/DL (ref 6–23)
BUN SERPL-MCNC: 4 MG/DL (ref 6–23)
BUN SERPL-MCNC: 6 MG/DL (ref 6–23)
CALCIUM SERPL-MCNC: 9.2 MG/DL (ref 8.6–10.3)
CALCIUM SERPL-MCNC: 9.2 MG/DL (ref 8.6–10.3)
CALCIUM SERPL-MCNC: 9.4 MG/DL (ref 8.6–10.3)
CHLORIDE SERPL-SCNC: 101 MMOL/L (ref 98–107)
CO2 SERPL-SCNC: 21 MMOL/L (ref 21–32)
CO2 SERPL-SCNC: 23 MMOL/L (ref 21–32)
CO2 SERPL-SCNC: 23 MMOL/L (ref 21–32)
CREAT SERPL-MCNC: 0.8 MG/DL (ref 0.5–1.05)
CREAT SERPL-MCNC: 0.8 MG/DL (ref 0.5–1.05)
CREAT SERPL-MCNC: 0.84 MG/DL (ref 0.5–1.05)
EGFRCR SERPLBLD CKD-EPI 2021: 82 ML/MIN/1.73M*2
EGFRCR SERPLBLD CKD-EPI 2021: 87 ML/MIN/1.73M*2
EGFRCR SERPLBLD CKD-EPI 2021: 87 ML/MIN/1.73M*2
ERYTHROCYTE [DISTWIDTH] IN BLOOD BY AUTOMATED COUNT: 14 % (ref 11.5–14.5)
GLUCOSE SERPL-MCNC: 162 MG/DL (ref 74–99)
GLUCOSE SERPL-MCNC: 96 MG/DL (ref 74–99)
GLUCOSE SERPL-MCNC: 96 MG/DL (ref 74–99)
HCT VFR BLD AUTO: 37.4 % (ref 36–46)
HGB BLD-MCNC: 13.3 G/DL (ref 12–16)
HOLD SPECIMEN: NORMAL
HOLD SPECIMEN: NORMAL
LIPASE SERPL-CCNC: 18 U/L (ref 9–82)
MAGNESIUM SERPL-MCNC: 2.25 MG/DL (ref 1.6–2.4)
MCH RBC QN AUTO: 33.3 PG (ref 26–34)
MCHC RBC AUTO-ENTMCNC: 35.6 G/DL (ref 32–36)
MCV RBC AUTO: 94 FL (ref 80–100)
NRBC BLD-RTO: 0 /100 WBCS (ref 0–0)
PHOSPHATE SERPL-MCNC: 2.1 MG/DL (ref 2.5–4.9)
PLATELET # BLD AUTO: 293 X10*3/UL (ref 150–450)
POTASSIUM SERPL-SCNC: 3.3 MMOL/L (ref 3.5–5.3)
POTASSIUM SERPL-SCNC: 3.6 MMOL/L (ref 3.5–5.3)
POTASSIUM SERPL-SCNC: 3.6 MMOL/L (ref 3.5–5.3)
PROT SERPL-MCNC: 6.6 G/DL (ref 6.4–8.2)
RBC # BLD AUTO: 4 X10*6/UL (ref 4–5.2)
SODIUM SERPL-SCNC: 132 MMOL/L (ref 136–145)
SODIUM SERPL-SCNC: 132 MMOL/L (ref 136–145)
SODIUM SERPL-SCNC: 133 MMOL/L (ref 136–145)
WBC # BLD AUTO: 8.3 X10*3/UL (ref 4.4–11.3)

## 2024-06-16 PROCEDURE — 94640 AIRWAY INHALATION TREATMENT: CPT | Mod: IPSPLIT

## 2024-06-16 PROCEDURE — 83735 ASSAY OF MAGNESIUM: CPT | Mod: IPSPLIT | Performed by: NURSE PRACTITIONER

## 2024-06-16 PROCEDURE — 94664 DEMO&/EVAL PT USE INHALER: CPT | Mod: IPSPLIT

## 2024-06-16 PROCEDURE — 83690 ASSAY OF LIPASE: CPT | Mod: IPSPLIT | Performed by: NURSE PRACTITIONER

## 2024-06-16 PROCEDURE — 2500000001 HC RX 250 WO HCPCS SELF ADMINISTERED DRUGS (ALT 637 FOR MEDICARE OP): Mod: IPSPLIT | Performed by: NURSE PRACTITIONER

## 2024-06-16 PROCEDURE — 85027 COMPLETE CBC AUTOMATED: CPT | Mod: IPSPLIT | Performed by: NURSE PRACTITIONER

## 2024-06-16 PROCEDURE — 82150 ASSAY OF AMYLASE: CPT | Mod: IPSPLIT | Performed by: NURSE PRACTITIONER

## 2024-06-16 PROCEDURE — 84100 ASSAY OF PHOSPHORUS: CPT | Mod: IPSPLIT | Performed by: NURSE PRACTITIONER

## 2024-06-16 PROCEDURE — 94640 AIRWAY INHALATION TREATMENT: CPT

## 2024-06-16 PROCEDURE — 2500000004 HC RX 250 GENERAL PHARMACY W/ HCPCS (ALT 636 FOR OP/ED): Mod: IPSPLIT

## 2024-06-16 PROCEDURE — 2500000004 HC RX 250 GENERAL PHARMACY W/ HCPCS (ALT 636 FOR OP/ED): Mod: IPSPLIT | Performed by: NURSE PRACTITIONER

## 2024-06-16 PROCEDURE — 36415 COLL VENOUS BLD VENIPUNCTURE: CPT | Mod: IPSPLIT | Performed by: NURSE PRACTITIONER

## 2024-06-16 PROCEDURE — 2500000004 HC RX 250 GENERAL PHARMACY W/ HCPCS (ALT 636 FOR OP/ED): Mod: IPSPLIT | Performed by: INTERNAL MEDICINE

## 2024-06-16 PROCEDURE — 2500000002 HC RX 250 W HCPCS SELF ADMINISTERED DRUGS (ALT 637 FOR MEDICARE OP, ALT 636 FOR OP/ED): Mod: IPSPLIT | Performed by: NURSE PRACTITIONER

## 2024-06-16 PROCEDURE — 94760 N-INVAS EAR/PLS OXIMETRY 1: CPT | Mod: IPSPLIT

## 2024-06-16 PROCEDURE — 80048 BASIC METABOLIC PNL TOTAL CA: CPT | Mod: CCI,IPSPLIT | Performed by: NURSE PRACTITIONER

## 2024-06-16 PROCEDURE — 1100000001 HC PRIVATE ROOM DAILY: Mod: IPSPLIT

## 2024-06-16 PROCEDURE — 84520 ASSAY OF UREA NITROGEN: CPT | Mod: IPSPLIT | Performed by: NURSE PRACTITIONER

## 2024-06-16 PROCEDURE — 9420000001 HC RT PATIENT EDUCATION 5 MIN: Mod: IPSPLIT

## 2024-06-16 RX ORDER — ONDANSETRON 4 MG/1
4 TABLET, FILM COATED ORAL EVERY 8 HOURS PRN
COMMUNITY

## 2024-06-16 RX ORDER — SUMATRIPTAN SUCCINATE 25 MG/1
25 TABLET ORAL ONCE
Status: COMPLETED | OUTPATIENT
Start: 2024-06-16 | End: 2024-06-16

## 2024-06-16 RX ORDER — ZOLPIDEM TARTRATE 5 MG/1
10 TABLET ORAL NIGHTLY PRN
Status: DISCONTINUED | OUTPATIENT
Start: 2024-06-16 | End: 2024-06-17 | Stop reason: HOSPADM

## 2024-06-16 RX ORDER — LEVETIRACETAM 500 MG/5ML
INJECTION, SOLUTION, CONCENTRATE INTRAVENOUS
Status: DISPENSED
Start: 2024-06-16 | End: 2024-06-16

## 2024-06-16 RX ORDER — ALBUTEROL SULFATE 90 UG/1
2 AEROSOL, METERED RESPIRATORY (INHALATION) 2 TIMES DAILY
Status: DISCONTINUED | OUTPATIENT
Start: 2024-06-16 | End: 2024-06-17 | Stop reason: HOSPADM

## 2024-06-16 RX ORDER — SUMATRIPTAN 50 MG/1
50 TABLET, FILM COATED ORAL ONCE AS NEEDED
COMMUNITY

## 2024-06-16 RX ORDER — LEVOTHYROXINE SODIUM 75 UG/1
150 TABLET ORAL DAILY
Status: DISCONTINUED | OUTPATIENT
Start: 2024-06-16 | End: 2024-06-17 | Stop reason: HOSPADM

## 2024-06-16 RX ORDER — DIPHENOXYLATE HYDROCHLORIDE AND ATROPINE SULFATE 2.5; .025 MG/1; MG/1
1 TABLET ORAL 3 TIMES DAILY PRN
Status: DISCONTINUED | OUTPATIENT
Start: 2024-06-16 | End: 2024-06-17 | Stop reason: HOSPADM

## 2024-06-16 RX ORDER — DESVENLAFAXINE 50 MG/1
50 TABLET, EXTENDED RELEASE ORAL DAILY
Status: DISCONTINUED | OUTPATIENT
Start: 2024-06-16 | End: 2024-06-17 | Stop reason: HOSPADM

## 2024-06-16 RX ORDER — CHOLECALCIFEROL (VITAMIN D3) 25 MCG
2000 TABLET ORAL
Status: DISCONTINUED | OUTPATIENT
Start: 2024-06-16 | End: 2024-06-17 | Stop reason: HOSPADM

## 2024-06-16 RX ORDER — FENOFIBRATE 160 MG/1
160 TABLET ORAL DAILY
Status: DISCONTINUED | OUTPATIENT
Start: 2024-06-16 | End: 2024-06-17 | Stop reason: HOSPADM

## 2024-06-16 RX ORDER — BENZTROPINE MESYLATE 1 MG/1
0.5 TABLET ORAL ONCE
Status: COMPLETED | OUTPATIENT
Start: 2024-06-16 | End: 2024-06-16

## 2024-06-16 RX ORDER — LORATADINE 10 MG/1
10 TABLET ORAL DAILY PRN
Status: DISCONTINUED | OUTPATIENT
Start: 2024-06-16 | End: 2024-06-17 | Stop reason: HOSPADM

## 2024-06-16 RX ORDER — MELOXICAM 7.5 MG/1
7.5 TABLET ORAL AS NEEDED
COMMUNITY

## 2024-06-16 RX ORDER — ATORVASTATIN CALCIUM 40 MG/1
80 TABLET, FILM COATED ORAL NIGHTLY
Status: DISCONTINUED | OUTPATIENT
Start: 2024-06-16 | End: 2024-06-17 | Stop reason: HOSPADM

## 2024-06-16 RX ORDER — LACOSAMIDE 50 MG/1
50 TABLET ORAL 2 TIMES DAILY
Status: ON HOLD | COMMUNITY
End: 2024-06-17

## 2024-06-16 RX ORDER — ZAVEGEPANT 10 MG/.1ML
10 SPRAY NASAL DAILY PRN
COMMUNITY

## 2024-06-16 RX ORDER — SODIUM CHLORIDE 9 MG/ML
10 INJECTION, SOLUTION INTRAVENOUS CONTINUOUS PRN
Status: DISCONTINUED | OUTPATIENT
Start: 2024-06-16 | End: 2024-06-17 | Stop reason: HOSPADM

## 2024-06-16 RX ORDER — LACOSAMIDE 100 MG/1
50 TABLET ORAL 2 TIMES DAILY
Status: DISCONTINUED | OUTPATIENT
Start: 2024-06-16 | End: 2024-06-17 | Stop reason: HOSPADM

## 2024-06-16 RX ORDER — GABAPENTIN 300 MG/1
600 CAPSULE ORAL 3 TIMES DAILY
Status: DISCONTINUED | OUTPATIENT
Start: 2024-06-16 | End: 2024-06-17 | Stop reason: HOSPADM

## 2024-06-16 RX ORDER — ALBUTEROL SULFATE 90 UG/1
2 AEROSOL, METERED RESPIRATORY (INHALATION) EVERY 2 HOUR PRN
Status: DISCONTINUED | OUTPATIENT
Start: 2024-06-16 | End: 2024-06-17 | Stop reason: HOSPADM

## 2024-06-16 RX ORDER — DESVENLAFAXINE 50 MG/1
50 TABLET, EXTENDED RELEASE ORAL DAILY
COMMUNITY

## 2024-06-16 RX ORDER — POTASSIUM CHLORIDE 20 MEQ/1
40 TABLET, EXTENDED RELEASE ORAL ONCE
Status: COMPLETED | OUTPATIENT
Start: 2024-06-16 | End: 2024-06-16

## 2024-06-16 RX ORDER — HALOPERIDOL DECANOATE 50 MG/ML
75 INJECTION INTRAMUSCULAR
Status: CANCELLED | OUTPATIENT
Start: 2024-06-16

## 2024-06-16 RX ORDER — FLUTICASONE FUROATE AND VILANTEROL 200; 25 UG/1; UG/1
1 POWDER RESPIRATORY (INHALATION)
Status: DISCONTINUED | OUTPATIENT
Start: 2024-06-16 | End: 2024-06-17 | Stop reason: HOSPADM

## 2024-06-16 RX ORDER — FLUTICASONE PROPIONATE 50 MCG
1 SPRAY, SUSPENSION (ML) NASAL DAILY
Status: DISCONTINUED | OUTPATIENT
Start: 2024-06-16 | End: 2024-06-17 | Stop reason: HOSPADM

## 2024-06-16 RX ORDER — DEXTROSE MONOHYDRATE 50 MG/ML
INJECTION, SOLUTION INTRAVENOUS
Status: COMPLETED
Start: 2024-06-16 | End: 2024-06-16

## 2024-06-16 RX ORDER — CHOLESTYRAMINE 4 G/4.8G
4 POWDER, FOR SUSPENSION ORAL 2 TIMES DAILY
Status: DISCONTINUED | OUTPATIENT
Start: 2024-06-16 | End: 2024-06-16

## 2024-06-16 RX ORDER — SUMATRIPTAN SUCCINATE 25 MG/1
50 TABLET ORAL ONCE AS NEEDED
Status: DISCONTINUED | OUTPATIENT
Start: 2024-06-16 | End: 2024-06-17 | Stop reason: HOSPADM

## 2024-06-16 RX ORDER — CHOLESTYRAMINE 4 G/9G
4 POWDER, FOR SUSPENSION ORAL
Status: DISCONTINUED | OUTPATIENT
Start: 2024-06-16 | End: 2024-06-17 | Stop reason: HOSPADM

## 2024-06-16 RX ORDER — SUMATRIPTAN SUCCINATE 25 MG/1
100 TABLET ORAL ONCE
Status: COMPLETED | OUTPATIENT
Start: 2024-06-16 | End: 2024-06-16

## 2024-06-16 RX ORDER — VILAZODONE HYDROCHLORIDE 20 MG/1
40 TABLET ORAL
Status: DISCONTINUED | OUTPATIENT
Start: 2024-06-16 | End: 2024-06-17 | Stop reason: HOSPADM

## 2024-06-16 RX ORDER — LAMOTRIGINE 25 MG/1
75 TABLET ORAL DAILY
Status: DISCONTINUED | OUTPATIENT
Start: 2024-06-16 | End: 2024-06-17 | Stop reason: HOSPADM

## 2024-06-16 RX ORDER — TRAZODONE HYDROCHLORIDE 100 MG/1
300 TABLET ORAL NIGHTLY
Status: DISCONTINUED | OUTPATIENT
Start: 2024-06-16 | End: 2024-06-17 | Stop reason: HOSPADM

## 2024-06-16 RX ORDER — LACOSAMIDE 100 MG/1
50 TABLET ORAL 2 TIMES DAILY
Status: DISCONTINUED | OUTPATIENT
Start: 2024-06-16 | End: 2024-06-16

## 2024-06-16 RX ORDER — MIRTAZAPINE 15 MG/1
15 TABLET, FILM COATED ORAL NIGHTLY
Status: DISCONTINUED | OUTPATIENT
Start: 2024-06-16 | End: 2024-06-17 | Stop reason: HOSPADM

## 2024-06-16 RX ORDER — SUCRALFATE 1 G/1
1 TABLET ORAL
COMMUNITY

## 2024-06-16 RX ORDER — LISINOPRIL 20 MG/1
40 TABLET ORAL DAILY
Status: DISCONTINUED | OUTPATIENT
Start: 2024-06-16 | End: 2024-06-17 | Stop reason: HOSPADM

## 2024-06-16 RX ORDER — PNV NO.95/FERROUS FUM/FOLIC AC 28MG-0.8MG
100 TABLET ORAL DAILY
Status: DISCONTINUED | OUTPATIENT
Start: 2024-06-16 | End: 2024-06-17 | Stop reason: HOSPADM

## 2024-06-16 RX ORDER — SUCRALFATE 1 G/1
1 TABLET ORAL
Status: DISCONTINUED | OUTPATIENT
Start: 2024-06-16 | End: 2024-06-17 | Stop reason: HOSPADM

## 2024-06-16 RX ORDER — PANTOPRAZOLE SODIUM 40 MG/1
40 TABLET, DELAYED RELEASE ORAL
Status: DISCONTINUED | OUTPATIENT
Start: 2024-06-16 | End: 2024-06-17 | Stop reason: HOSPADM

## 2024-06-16 ASSESSMENT — PAIN DESCRIPTION - LOCATION
LOCATION: HIP
LOCATION: HEAD

## 2024-06-16 ASSESSMENT — COGNITIVE AND FUNCTIONAL STATUS - GENERAL
MOBILITY SCORE: 21
CLIMB 3 TO 5 STEPS WITH RAILING: A LITTLE
DAILY ACTIVITIY SCORE: 24
WALKING IN HOSPITAL ROOM: A LITTLE
DAILY ACTIVITIY SCORE: 24
MOBILITY SCORE: 24
STANDING UP FROM CHAIR USING ARMS: A LITTLE

## 2024-06-16 ASSESSMENT — PAIN SCALES - GENERAL
PAINLEVEL_OUTOF10: 2
PAINLEVEL_OUTOF10: 3
PAINLEVEL_OUTOF10: 0 - NO PAIN
PAINLEVEL_OUTOF10: 0 - NO PAIN
PAINLEVEL_OUTOF10: 3
PAINLEVEL_OUTOF10: 3
PAINLEVEL_OUTOF10: 0 - NO PAIN
PAINLEVEL_OUTOF10: 3

## 2024-06-16 ASSESSMENT — PAIN - FUNCTIONAL ASSESSMENT
PAIN_FUNCTIONAL_ASSESSMENT: 0-10

## 2024-06-16 NOTE — PROGRESS NOTES
"Erica Machado is a 58 y.o. female on day 1 of admission presenting with Hyponatremia.      Subjective   \"I was vomiting last evening - not yet this am\" Pt appears alert and comfortable this am - denies pain        Objective     Last Recorded Vitals  BP (!) 163/94 (BP Location: Left arm, Patient Position: Lying)   Pulse 91   Temp 36.1 °C (97 °F) (Temporal)   Resp 16   Wt 85.8 kg (189 lb 2.5 oz)   SpO2 98%   Intake/Output last 3 Shifts:    Intake/Output Summary (Last 24 hours) at 6/16/2024 1150  Last data filed at 6/16/2024 0950  Gross per 24 hour   Intake 3267 ml   Output 5322 ml   Net -2055 ml       Admission Weight  Weight: 86.6 kg (191 lb) (06/15/24 1116)    Daily Weight  06/16/24 : 85.8 kg (189 lb 2.5 oz)    Image Results  CT abdomen pelvis w IV contrast  Narrative: Interpreted By:  Magdy Jules,   STUDY:  CT ABDOMEN PELVIS W IV CONTRAST;  6/15/2024 12:36 pm      INDICATION:  Signs/Symptoms:abdominal pain, nausea and vomiting.      COMPARISON:  09/16/2023      ACCESSION NUMBER(S):  [id number]      ORDERING CLINICIAN:  ROSALBA VILLA      TECHNIQUE:  Contiguous axial images were obtained through the abdomen and pelvis  following the intravenous administration of 75 cc of Omnipaque 350.  Coronal and sagittal reconstructions were performed.      FINDINGS:  LOWER CHEST:  Images through the lung bases  demonstrate mild areas of atelectasis  and/or scarring. Mild prominence of the walls of the visible portions  of the distal esophagus may relate to incomplete distention but  esophagitis is not excluded.      ABDOMEN AND PELVIS:      LIVER:  Decreased attenuation throughout the liver relative to the spleen  could relate to fatty infiltration or possibly artifactual from  incomplete IV contrast equilibration.      BILE DUCTS:  Not abnormally dilated.      GALLBLADDER:  No calcified stones. No wall thickening.      PANCREAS:  Appears unremarkable.      SPLEEN:  Appears unremarkable.      ADRENAL " GLANDS:  Appear unremarkable.      KIDNEYS, URETERS, AND BLADDER:  The kidneys enhance with contrast material symmetrically. There is no  evidence of hydronephrosis.  The urinary bladder appears grossly  unremarkable.      BOWEL:  Mild prominence of the walls of the colon as well as a few distal  ileal bowel loops, including the terminal ileum, may relate to  incomplete distention but entero colitis is also possible. There is  no evidence of a bowel obstruction.  Appendix is not confidently  identified. Although CT has limited sensitivity and specificity for  gastric pathology, the stomach appears grossly unremarkable.      RETROPERITONEUM, VESSELS:  There is no aneurysmal dilatation of the abdominal aorta. The IVC is  within normal limits.  There are atherosclerotic calcifications of  the aorta and its branches. No pathologically enlarged  retroperitoneal lymph nodes are noted.      PERITONEUM:  There is no evidence of pneumoperitoneum.  No ascites or loculated  fluid collection noted. Uterus is normal in size. No pathologically  enlarged mesenteric lymph nodes are identified.      ABDOMINAL WALL, SOFT TISSUES:  No acute process.      SKELETON:  Degenerative changes. No acute process.      Impression: Mild prominence of the walls of the colon as well as a few distal  small bowel loops may relate to incomplete distention but entero  colitis can not be excluded. Colonic diverticulosis without evidence  of diverticulitis.      MACRO:  None.      Signed by: Magdy Jules 6/15/2024 12:54 PM  Dictation workstation:   [id number]  CT head wo IV contrast, CT cervical spine wo IV contrast  Narrative: Interpreted By:  Michel Delaney,   STUDY:  CT HEAD WO IV CONTRAST; CT CERVICAL SPINE WO IV CONTRAST;  6/15/2024  12:28 pm      INDICATION:  Trauma. Signs/Symptoms:fall hit head; Signs/Symptoms:fall/head injury.      COMPARISON:  April 10, 2024 head CT. December 12, 2020 CT head and cervical spine  examinations.      ACCESSION  NUMBER(S):  [id number]; [id number]      ORDERING CLINICIAN:  ROSALBA VILLA      TECHNIQUE:  Axial noncontrast head and cervical spine CT      FINDINGS:  Head:      Parenchyma: The grey-white differentiation is intact. There is no  mass effect or midline shift.  There is no intracranial hemorrhage.      CSF Spaces: The ventricles, sulci and basal cisterns are within  normal limits for patient's age. There is no extraaxial fluid  collection.      Calvarium: No evidence of fracture was identified.      Paranasal sinuses and mastoids: Visualized paranasal sinuses and  mastoids are clear.      Cervical spine:      Alignment: Similar without interval traumatic subluxation.      Cranial cervical junction: Intact.      Fracture: No acute fracture.      Vertebra and intervertebral disc spaces: Similar prominent multilevel  facet arthrosis and mild discogenic degenerative changes without  evident significant canal narrowing.      Paravertebral soft tissues: No hematoma. Trace arterial vascular  calcifications.      Brain Injury (BIG) guidelines CT values:      Skull fracture: No  SDH (subdural hematoma): None detected  EDH (epidural hemtoma): None detected  IPH (intraparenchymal hemorrhage): None detected  SAH (subarachnoid hemorrhage): None detected  IVH (intraventricular hemorrhage): No      Reference:  Juanito HAYNES, Chelsea RS, Venkata M, et al. The BIG (brain injury  guidelines) project: defining the management of traumatic brain  injury by acute care surgeons. J Trauma Acute Care Surg.  2014;76:011a367.      Impression: Head:      No acute intracranial abnormality was identified.      No fracture.      Cervical spine: No fracture or subluxation.      Similar degenerative changes.      Trace arterial vascular calcifications.      MACRO:  None          Signed by: Michel Delaney 6/15/2024 12:41 PM  Dictation workstation:   [id number]      Physical Exam  Constitutional:       Appearance: Normal appearance.   HENT:       Head: Normocephalic.      Mouth/Throat:      Mouth: Mucous membranes are moist.   Eyes:      Extraocular Movements: Extraocular movements intact.   Cardiovascular:      Rate and Rhythm: Normal rate.      Pulses: Normal pulses.      Heart sounds: Normal heart sounds.   Pulmonary:      Effort: Pulmonary effort is normal.      Breath sounds: Normal breath sounds.   Abdominal:      General: Bowel sounds are normal.      Palpations: Abdomen is soft.   Musculoskeletal:         General: Normal range of motion.      Cervical back: Normal range of motion.   Skin:     General: Skin is warm and dry.      Capillary Refill: Capillary refill takes less than 2 seconds.   Neurological:      General: No focal deficit present.      Mental Status: She is alert and oriented to person, place, and time.   Psychiatric:         Mood and Affect: Mood normal.         Behavior: Behavior normal.         Relevant Results           Scheduled medications  albuterol, 2 puff, inhalation, BID  atorvastatin, 80 mg, oral, Nightly  busPIRone, 15 mg, oral, BID  cholecalciferol, 2,000 Units, oral, Daily  cholestyramine, 4 g, oral, BID  cyanocobalamin, 100 mcg, oral, Daily  fenofibrate, 160 mg, oral, Daily  fluticasone, 1 spray, Each Nostril, Daily  fluticasone furoate-vilanteroL, 1 puff, inhalation, Daily  gabapentin, 600 mg, oral, TID  lacosamide, 50 mg, oral, BID  lamoTRIgine, 75 mg, oral, Daily  levETIRAcetam, 500 mg, intravenous, q12h  levETIRAcetam, , ,   levothyroxine, 150 mcg, oral, Daily  lisinopril, 40 mg, oral, Daily  mirtazapine, 15 mg, oral, Nightly  pantoprazole, 40 mg, oral, Daily before breakfast  traZODone, 300 mg, oral, Nightly  vilazodone, 40 mg, oral, Daily with breakfast      Continuous medications  sodium chloride 0.9%, 10 mL/hr      PRN medications  PRN medications: acetaminophen, acetaminophen, albuterol, diphenoxylate-atropine, hydrALAZINE, levETIRAcetam, loratadine, LORazepam, ondansetron, oxygen, polyethylene glycol,  prochlorperazine **OR** prochlorperazine **OR** prochlorperazine, promethazine, sodium chloride 0.9%, zolpidem  Results for orders placed or performed during the hospital encounter of 06/15/24 (from the past 24 hour(s))   Urinalysis with Reflex Culture and Microscopic   Result Value Ref Range    Color, Urine Light-Yellow Light-Yellow, Yellow, Dark-Yellow    Appearance, Urine Clear Clear    Specific Gravity, Urine 1.022 1.005 - 1.035    pH, Urine 6.5 5.0, 5.5, 6.0, 6.5, 7.0, 7.5, 8.0    Protein, Urine 50 (1+) (A) NEGATIVE, 10 (TRACE), 20 (TRACE) mg/dL    Glucose, Urine Normal Normal mg/dL    Blood, Urine 0.06 (1+) (A) NEGATIVE    Ketones, Urine TRACE (A) NEGATIVE mg/dL    Bilirubin, Urine NEGATIVE NEGATIVE    Urobilinogen, Urine Normal Normal mg/dL    Nitrite, Urine NEGATIVE NEGATIVE    Leukocyte Esterase, Urine 25 Marianne/µL (A) NEGATIVE   Extra Urine Gray Tube   Result Value Ref Range    Extra Tube Hold for add-ons.    Microscopic Only, Urine   Result Value Ref Range    WBC, Urine 1-5 1-5, NONE /HPF    RBC, Urine 1-2 NONE, 1-2, 3-5 /HPF    Squamous Epithelial Cells, Urine 1-9 (SPARSE) Reference range not established. /HPF   Drug Screen, Urine   Result Value Ref Range    Amphetamine Screen, Urine Presumptive Negative Presumptive Negative    Barbiturate Screen, Urine Presumptive Positive (A) Presumptive Negative    Benzodiazepines Screen, Urine Presumptive Negative Presumptive Negative    Cannabinoid Screen, Urine Presumptive Negative Presumptive Negative    Cocaine Metabolite Screen, Urine Presumptive Negative Presumptive Negative    Fentanyl Screen, Urine Presumptive Negative Presumptive Negative    Opiate Screen, Urine Presumptive Positive (A) Presumptive Negative    Oxycodone Screen, Urine Presumptive Negative Presumptive Negative    PCP Screen, Urine Presumptive Negative Presumptive Negative    Methadone Screen, Urine Presumptive Negative Presumptive Negative   Urinalysis with Reflex Culture and Microscopic   Result  Value Ref Range    Color, Urine Colorless (N) Light-Yellow, Yellow, Dark-Yellow    Appearance, Urine Clear Clear    Specific Gravity, Urine 1.004 (N) 1.005 - 1.035    pH, Urine 6.5 5.0, 5.5, 6.0, 6.5, 7.0, 7.5, 8.0    Protein, Urine NEGATIVE NEGATIVE, 10 (TRACE), 20 (TRACE) mg/dL    Glucose, Urine Normal Normal mg/dL    Blood, Urine NEGATIVE NEGATIVE    Ketones, Urine NEGATIVE NEGATIVE mg/dL    Bilirubin, Urine NEGATIVE NEGATIVE    Urobilinogen, Urine Normal Normal mg/dL    Nitrite, Urine NEGATIVE NEGATIVE    Leukocyte Esterase, Urine NEGATIVE NEGATIVE   Extra Urine Gray Tube   Result Value Ref Range    Extra Tube Hold for add-ons.    Basic metabolic panel   Result Value Ref Range    Glucose 121 (H) 74 - 99 mg/dL    Sodium 125 (L) 136 - 145 mmol/L    Potassium 4.1 3.5 - 5.3 mmol/L    Chloride 93 (L) 98 - 107 mmol/L    Bicarbonate 22 21 - 32 mmol/L    Anion Gap 14 10 - 20 mmol/L    Urea Nitrogen 3 (L) 6 - 23 mg/dL    Creatinine 0.64 0.50 - 1.05 mg/dL    eGFR >90 >60 mL/min/1.73m*2    Calcium 9.2 8.6 - 10.3 mg/dL   SST TOP   Result Value Ref Range    Extra Tube Hold for add-ons.    CBC   Result Value Ref Range    WBC 8.3 4.4 - 11.3 x10*3/uL    nRBC 0.0 0.0 - 0.0 /100 WBCs    RBC 4.00 4.00 - 5.20 x10*6/uL    Hemoglobin 13.3 12.0 - 16.0 g/dL    Hematocrit 37.4 36.0 - 46.0 %    MCV 94 80 - 100 fL    MCH 33.3 26.0 - 34.0 pg    MCHC 35.6 32.0 - 36.0 g/dL    RDW 14.0 11.5 - 14.5 %    Platelets 293 150 - 450 x10*3/uL   Basic metabolic panel   Result Value Ref Range    Glucose 96 74 - 99 mg/dL    Sodium 132 (L) 136 - 145 mmol/L    Potassium 3.6 3.5 - 5.3 mmol/L    Chloride 101 98 - 107 mmol/L    Bicarbonate 23 21 - 32 mmol/L    Anion Gap 12 10 - 20 mmol/L    Urea Nitrogen 4 (L) 6 - 23 mg/dL    Creatinine 0.80 0.50 - 1.05 mg/dL    eGFR 87 >60 mL/min/1.73m*2    Calcium 9.2 8.6 - 10.3 mg/dL   Magnesium   Result Value Ref Range    Magnesium 2.25 1.60 - 2.40 mg/dL   Phosphorus   Result Value Ref Range    Phosphorus 2.1 (L) 2.5 -  "4.9 mg/dL   Comprehensive metabolic panel   Result Value Ref Range    Glucose 96 74 - 99 mg/dL    Sodium 132 (L) 136 - 145 mmol/L    Potassium 3.6 3.5 - 5.3 mmol/L    Chloride 101 98 - 107 mmol/L    Bicarbonate 23 21 - 32 mmol/L    Anion Gap 12 10 - 20 mmol/L    Urea Nitrogen 4 (L) 6 - 23 mg/dL    Creatinine 0.80 0.50 - 1.05 mg/dL    eGFR 87 >60 mL/min/1.73m*2    Calcium 9.2 8.6 - 10.3 mg/dL    Albumin 4.6 3.4 - 5.0 g/dL    Alkaline Phosphatase 45 33 - 110 U/L    Total Protein 6.6 6.4 - 8.2 g/dL    AST 31 9 - 39 U/L    Bilirubin, Total 0.6 0.0 - 1.2 mg/dL    ALT 21 7 - 45 U/L         Assessment/Plan        Principal Problem:    Hyponatremia    Hyponatremia  Hyperemesis  Diarrhea  - zofran, phenergan, compazine prn nausea  - protonix  - stool pathogen PCR and cdiff cx's  - UDS barbituates and opiates - received morphine in the ED - many psych meds ...  - UA with reflex to culture  - 1L NS in ED  NS @125cc's/hr  - additional 1L NS upon arrival to the ICU with ongoing vomiting  - Replete K+ to 4.0 and Mg+ to 2.0  - CT abd: Mild prominence of the walls of the colon as well as a few distal  small bowel loops may relate to incomplete distention but entero  colitis can not be excluded. Colonic diverticulosis without evidence  of diverticulitis  - leuks 11.7, 8.3 - afebrile   - liver enzymes, amylase, lipase within normal limits   - 6/16 able to tolerate po and meds restarted, IV fluids stopped      Seizures   - admitted 4/10/24 with seizures after running out of seizure medications at home   - by hx - pt has 2-3 seizures per usual wk and always loses consciousness and is incontinent - her warning is the smell of \"burnt rubber\"  - follows with Ronaldo Garcia regarding her seizures   - continue vimpat, lamictal - IV until po tolerated   - seizure precautions  - no observed seizure activity      HTN  - continue lisinopril if po intake sufficient  - hydralazine prn SBP > 160      COPD  - continue dulera     Hypothyroid  - " continue synthroid 150 mcg's - teach pt about compliance and instructions for taking   - TSH 54.6, Free Thyroxine 0.6 4/17/24 - follow up PCP for dose adjustments      GERD  - protonix     Schizoaffective disorder/major depressive disorder/BiPolar disorder  - continue cogentin, buspar, haldol, remeron, trazadone, viibryd     PIV, regular diet, lytes replaced and Full Code      Total accumulated time spent face to face and not face to face preparing to see the patient, obtaining and reviewing separately obtained history; performing a medically appropriate examination and/or evaluation; counseling and educating the patient, family; ordering medications, tests, or procedures; referring and communicating with other health care professionals; documenting clinical information in the patient's medical record; independently interpreting results and communicating the results to the patient, family; and care coordination was 45 minutes              MIKKI Saenz-CNP

## 2024-06-16 NOTE — CARE PLAN
Problem: Skin  Goal: Decreased wound size/increased tissue granulation at next dressing change  Outcome: Met  Goal: Participates in plan/prevention/treatment measures  Outcome: Met  Goal: Prevent/manage excess moisture  Outcome: Met  Goal: Prevent/minimize sheer/friction injuries  Outcome: Progressing  Goal: Promote/optimize nutrition  Outcome: Progressing  Goal: Promote skin healing  Outcome: Progressing     Problem: Pain  Goal: Takes deep breaths with improved pain control throughout the shift  Outcome: Progressing  Goal: Turns in bed with improved pain control throughout the shift  Outcome: Progressing  Goal: Walks with improved pain control throughout the shift  Outcome: Progressing  Goal: Performs ADL's with improved pain control throughout shift  Outcome: Progressing  Goal: Participates in PT with improved pain control throughout the shift  Outcome: Progressing  Goal: Free from opioid side effects throughout the shift  Outcome: Progressing  Goal: Free from acute confusion related to pain meds throughout the shift  Outcome: Progressing   The patient's goals for the shift include no vomiting    The clinical goals for the shift include No seizure activity this shift.    No seizure activity this shift. Sodium 132 this am & 133 this afternoon.

## 2024-06-16 NOTE — NURSING NOTE
"0730: JOSE Newell CNP at bedside, pt seen. Aware pt c/o migraine headache.     0910: Sister Virginia on the phone with the patient, patient crying & upset, said her sister is yelling at her. Virginia then spoke with this nurse. Virginia upset that pt is having seizures and is \"in the hospital all the time\". Wants her questions answered as to \"why the seizures keep happening\" and \"why the doctor can't figure this shit out\". Virginia then apologized for being upset and said she will be coming in later today. Virginia told that CNP will talk with her when she comes in for an update. Suggested that patient and sister take a break from talking to each other for a little bit as they have been calling back and forth multiple times this morning and upsetting each other. Pt & sister in agreement.     1520: JOSE Perdomo CNP at bedside to update pt & sister on plan of care.     1642: Report to LUZ Hardin on med/surg.     1656: Transferred to room 211 via bed. Pt called sister to let her know of room change.   "

## 2024-06-16 NOTE — NURSING NOTE
2050: Spoke with Dr. Wise regarding order for Hydralazine and received new order. Updated him about the Vimpat order and we do not have it.  stated to speak with whom ever placed order.

## 2024-06-16 NOTE — PROGRESS NOTES
CRITICAL CARE PROGRESS NOTE      Hospital Day # 0    Erica Machado  Primary Care Physician: Michael Gayle DO  Primary Pulmonologist:      Subjective/Last 24 Hour Update   Erica Machado is a 58 y.o. female presenting to the ED for abdominal pain, nausea and vomiting, high blood pressure, seizure, beginning 3 days ago.  The complaint has been intermittent, moderate in severity, and worsened by nothing.  Patient complains of epigastric and left upper quadrant abdominal discomfort associated with nausea vomiting and diarrhea over the last 3 days.  She has been able to keep her blood pressure medicine down (Lisinopril) and unable to keep her seizure medication down (VIMPAT) d/t vomiting.  States she has had 12 episodes of vomiting last 24 hours.  She does have a about 6-8 episodes of diarrhea.  No black tarry or bloody stools.  No hematemesis or hematochezia.  She states she think she might have had a seizure yesterday and she fell striking her head.  She has had no prior abdominal surgeries except had bilateral tubal ligation in the remote past.   She has had similar episodes like this in the past but not this severe.  No fever or chills.  No dysuria urgency or frequency.     Patient was loaded with keppra. Patient has drinked a lot of water and has N/V     Past history: reviewed as below    Past Medical History:   Diagnosis Date    Anxiety     COPD (chronic obstructive pulmonary disease) (Multi)     Diverticulosis     GERD (gastroesophageal reflux disease)     HLD (hyperlipidemia)     Hypertension     Hypothyroidism     Insomnia     Migraine     Osteoarthritis     Other specified anxiety disorders 01/03/2022    Depression with anxiety    Schizoaffective disorder (Multi)     Seizure (Multi)      Past Surgical History:   Procedure Laterality Date    ENDOMETRIAL ABLATION      FOOT SURGERY  10/13/2014    Foot Surgery    FOOT SURGERY       Social History     Socioeconomic History    Marital status:       Spouse name: None    Number of children: None    Years of education: None    Highest education level: None   Occupational History    None   Tobacco Use    Smoking status: Every Day     Current packs/day: 0.50     Types: Cigarettes     Passive exposure: Never    Smokeless tobacco: Never   Vaping Use    Vaping status: Never Used   Substance and Sexual Activity    Alcohol use: Never    Drug use: Never    Sexual activity: Not Currently   Other Topics Concern    None   Social History Narrative    None     Social Determinants of Health     Financial Resource Strain: High Risk (6/15/2024)    Overall Financial Resource Strain (CARDIA)     Difficulty of Paying Living Expenses: Very hard   Food Insecurity: No Food Insecurity (4/11/2024)    Hunger Vital Sign     Worried About Running Out of Food in the Last Year: Never true     Ran Out of Food in the Last Year: Never true   Transportation Needs: No Transportation Needs (6/15/2024)    PRAPARE - Transportation     Lack of Transportation (Medical): No     Lack of Transportation (Non-Medical): No   Physical Activity: Not on File (5/20/2021)    Received from [facility]     Physical Activity     Physical Activity: 0   Stress: At Risk (4/14/2022)    Received from [facility]     Stress     Stress: 2   Social Connections: Not at Risk (4/14/2022)    Received from [facility]     Social Connections     Social Connections and Isolation: 1   Intimate Partner Violence: Not on file   Housing Stability: Low Risk  (6/15/2024)    Housing Stability Vital Sign     Unable to Pay for Housing in the Last Year: No     Number of Places Lived in the Last Year: 1     Unstable Housing in the Last Year: No     Family History   Problem Relation Name Age of Onset    Diabetes Mother      Emphysema Mother      Emphysema Father             Objective         Vitals for last 24 hours Temperature Ins/Outs Weight   Temp:  [36 °C (96.8 °F)-36.6 °C (97.9 °F)] 36.5 °C (97.7 °F)  Heart Rate:  [] 89  Resp:  [14-24]  "24  BP: (125-194)/() 154/84  FiO2 (%):  [28 %] 28 % Temp (24hrs), Av.4 °C (97.5 °F), Min:36 °C (96.8 °F), Max:36.6 °C (97.9 °F)     Intake/Output Summary (Last 24 hours) at 6/15/2024 2330  Last data filed at 6/15/2024 2258  Gross per 24 hour   Intake 2427 ml   Output 3200 ml   Net -773 ml    Wt Readings from Last 7 Encounters:   06/15/24 85.2 kg (187 lb 13.3 oz)   24 86.6 kg (191 lb)   24 87.3 kg (192 lb 7.4 oz)   21 82.7 kg (182 lb 5.1 oz)   21 80.5 kg (177 lb 8 oz)   21 82.6 kg (182 lb)   10/27/18 (!) 33.5 kg (73 lb 13.7 oz)    Body mass index is 28.57 kg/m².   Sats Hemodynamics Cumulative I/O Vent Settings   SpO2 Readings from Last 3 Encounters:   06/15/24 97%   24 98%   22 98%          [unfilled]   @[facility]@     Exam:    Gen: NAD.   HEENT: NCAT  CV: RRR  Resp: Bilateral chest excursion  Abd: S, NT, ND  Ext: WWP, no significant peripheral edema noted.  Neuro: No focal deficits identified.      Drains External Urinary Catheter Female (Active)   Wall Suction (mmHg) 50 06/15/24 2015   External Catheter Status Changed 06/15/24 2015   Output (mL) 650 mL 06/15/24 2258   Number of days: 0          Medications           Scheduled Meds:[START ON 2024] lacosamide, 50 mg, intravenous, q12h  [Held by provider] lacosamide, 50 mg, oral, BID  levETIRAcetam, 500 mg, intravenous, q12h  levETIRAcetam, , ,   [START ON 2024] pantoprazole, 40 mg, oral, Daily before breakfast      Continuous Infusions:sodium chloride 0.9%, 100 mL/hr, Last Rate: 100 mL/hr (06/15/24 1759)          Laboratory Data     ID/Cultures:    Date Result Date  Result   Blood       Respiratory       Urine       C. Diff       Flu/RSV/COVID       Other    No results found for: \"HIV1X2\"  No results found for: \"AS9MNXHP\"       Hematology  Results from last 7 days   Lab Units 06/15/24  1138   WBC AUTO x10*3/uL 11.7*   RBC AUTO x10*6/uL 4.18   HEMOGLOBIN g/dL 13.8   HEMATOCRIT % 37.4   PLATELETS " "AUTO x10*3/uL 306       Chemistry         Results from last 7 days   Lab Units 06/15/24  1813 06/15/24  1138   SODIUM mmol/L 125* 117*   POTASSIUM mmol/L 4.1 2.9*   CHLORIDE mmol/L 93* 82*   CO2 mmol/L 22 23   BUN mg/dL 3* 3*   CREATININE mg/dL 0.64 0.56   CALCIUM mg/dL 9.2 9.2   ALBUMIN g/dL  --  4.7   PROTEIN TOTAL g/dL  --  7.2   BILIRUBIN TOTAL mg/dL  --  0.8   ALT U/L  --  22   AST U/L  --  33     Results from last 7 days   Lab Units 06/15/24  1138   LACTATE mmol/L 1.6     No lab exists for component: \"SCVO2\"      Blood Gases        No lab exists for component: \"ZLI4IPORB\", \"ZH9XWDLL\", \"BEDEFICIT\"       Assessment/Plan     Seizure disorder   Hyponatremia   Diarrhea   COPD   HTN   Schizoaffective disorder       - keppra ; neurology consulted for seizure   - currently hospital does  not carry vimpat   -given prn ativan   - sodium is 125 will not go higher than that   - will hold Nacl fluids    Will continue to manage and replete critical electrolytes. Will keep  Mg >2.0, K >4.0, and Phos greater than 3.0.  - Will actively target with vasopressors , if needed, to keep MAP > 65.  - Appropriately maintain saturations above 92%.  - Continue to monitor UOP carefully to ensure at least 0.5ml/kg/h.  - Maintain appropriate sleep/wake cycles to avoid ICU delirium.    DVT prophylaxis: scds  Code status:FULL        The entirety of this visit history, physical exam, and diagnostic interpretation was done via audiovisual telemedicine.     Patient is located in [place]  and I am located in [place].   Upon my own and separate evaluation, this patient had a high  probability of imminent of life-threatening deterioration due to seizure ,  which required my direct attention, intervention and personal  management.    I have personally provided 41 minutes of critical care time exclusive  of time spent on separately billable procedures. Time includes review  of laboratory data, radiology results, discussion with consultants,  and " monitoring of potential decompensation. Interventions were  performed as documented above.    Ramone Wise IV, MD  Pulmonary, Critical Care and Sleep Medicine  [facility]

## 2024-06-17 VITALS
HEART RATE: 88 BPM | SYSTOLIC BLOOD PRESSURE: 139 MMHG | TEMPERATURE: 98.1 F | WEIGHT: 189.15 LBS | RESPIRATION RATE: 17 BRPM | OXYGEN SATURATION: 95 % | HEIGHT: 68 IN | DIASTOLIC BLOOD PRESSURE: 88 MMHG | BODY MASS INDEX: 28.67 KG/M2

## 2024-06-17 VITALS
DIASTOLIC BLOOD PRESSURE: 76 MMHG | WEIGHT: 189.15 LBS | HEIGHT: 68 IN | TEMPERATURE: 97.7 F | OXYGEN SATURATION: 95 % | RESPIRATION RATE: 18 BRPM | BODY MASS INDEX: 28.67 KG/M2 | SYSTOLIC BLOOD PRESSURE: 120 MMHG | HEART RATE: 79 BPM

## 2024-06-17 LAB
ANION GAP SERPL CALC-SCNC: 12 MMOL/L (ref 10–20)
BACTERIA UR CULT: NORMAL
BASOPHILS # BLD AUTO: 0.07 X10*3/UL (ref 0–0.1)
BASOPHILS NFR BLD AUTO: 1.3 %
BUN SERPL-MCNC: 6 MG/DL (ref 6–23)
CALCIUM SERPL-MCNC: 9.3 MG/DL (ref 8.6–10.3)
CHLORIDE SERPL-SCNC: 103 MMOL/L (ref 98–107)
CO2 SERPL-SCNC: 20 MMOL/L (ref 21–32)
CREAT SERPL-MCNC: 0.8 MG/DL (ref 0.5–1.05)
EGFRCR SERPLBLD CKD-EPI 2021: 87 ML/MIN/1.73M*2
EOSINOPHIL # BLD AUTO: 0.14 X10*3/UL (ref 0–0.7)
EOSINOPHIL NFR BLD AUTO: 2.6 %
ERYTHROCYTE [DISTWIDTH] IN BLOOD BY AUTOMATED COUNT: 14.8 % (ref 11.5–14.5)
GLUCOSE SERPL-MCNC: 129 MG/DL (ref 74–99)
HCT VFR BLD AUTO: 35.1 % (ref 36–46)
HGB BLD-MCNC: 11.8 G/DL (ref 12–16)
IMM GRANULOCYTES # BLD AUTO: 0.02 X10*3/UL (ref 0–0.7)
IMM GRANULOCYTES NFR BLD AUTO: 0.4 % (ref 0–0.9)
LYMPHOCYTES # BLD AUTO: 1.66 X10*3/UL (ref 1.2–4.8)
LYMPHOCYTES NFR BLD AUTO: 31.1 %
MCH RBC QN AUTO: 33.1 PG (ref 26–34)
MCHC RBC AUTO-ENTMCNC: 33.6 G/DL (ref 32–36)
MCV RBC AUTO: 99 FL (ref 80–100)
MONOCYTES # BLD AUTO: 0.37 X10*3/UL (ref 0.1–1)
MONOCYTES NFR BLD AUTO: 6.9 %
NEUTROPHILS # BLD AUTO: 3.07 X10*3/UL (ref 1.2–7.7)
NEUTROPHILS NFR BLD AUTO: 57.7 %
NRBC BLD-RTO: 0 /100 WBCS (ref 0–0)
PLATELET # BLD AUTO: 262 X10*3/UL (ref 150–450)
POTASSIUM SERPL-SCNC: 3.5 MMOL/L (ref 3.5–5.3)
RBC # BLD AUTO: 3.56 X10*6/UL (ref 4–5.2)
SODIUM SERPL-SCNC: 131 MMOL/L (ref 136–145)
WBC # BLD AUTO: 5.3 X10*3/UL (ref 4.4–11.3)

## 2024-06-17 PROCEDURE — 2500000004 HC RX 250 GENERAL PHARMACY W/ HCPCS (ALT 636 FOR OP/ED): Mod: IPSPLIT | Performed by: NURSE PRACTITIONER

## 2024-06-17 PROCEDURE — 94760 N-INVAS EAR/PLS OXIMETRY 1: CPT | Mod: IPSPLIT

## 2024-06-17 PROCEDURE — 85025 COMPLETE CBC W/AUTO DIFF WBC: CPT | Mod: IPSPLIT | Performed by: NURSE PRACTITIONER

## 2024-06-17 PROCEDURE — 80048 BASIC METABOLIC PNL TOTAL CA: CPT | Mod: IPSPLIT | Performed by: NURSE PRACTITIONER

## 2024-06-17 PROCEDURE — 36415 COLL VENOUS BLD VENIPUNCTURE: CPT | Mod: IPSPLIT | Performed by: NURSE PRACTITIONER

## 2024-06-17 PROCEDURE — 2500000005 HC RX 250 GENERAL PHARMACY W/O HCPCS: Mod: IPSPLIT | Performed by: NURSE PRACTITIONER

## 2024-06-17 PROCEDURE — 94640 AIRWAY INHALATION TREATMENT: CPT

## 2024-06-17 PROCEDURE — 2500000001 HC RX 250 WO HCPCS SELF ADMINISTERED DRUGS (ALT 637 FOR MEDICARE OP): Mod: IPSPLIT | Performed by: NURSE PRACTITIONER

## 2024-06-17 RX ORDER — LAMOTRIGINE 25 MG/1
75 TABLET ORAL DAILY
Qty: 90 TABLET | Refills: 0 | Status: SHIPPED | OUTPATIENT
Start: 2024-06-17 | End: 2024-07-17

## 2024-06-17 RX ORDER — LACOSAMIDE 50 MG/1
50 TABLET ORAL 2 TIMES DAILY
Qty: 60 TABLET | Refills: 0 | Status: SHIPPED | OUTPATIENT
Start: 2024-06-17 | End: 2024-07-17

## 2024-06-17 ASSESSMENT — ACTIVITIES OF DAILY LIVING (ADL): LACK_OF_TRANSPORTATION: NO

## 2024-06-17 NOTE — DISCHARGE INSTR - OTHER ORDERS
Thank you for choosing [facility] for your Health Care needs.  Also, thank you for allowing us to take you and your families preferences into account when determining your discharge plan.  Stay well!    Your Care Transitions Team Member:Gisella Royal [phone]    For questions about your medications listed on your discharge instructions, please call the Nurses Station at [phone].    
car

## 2024-06-17 NOTE — PROGRESS NOTES
Physical Therapy                 Therapy Communication Note    Patient Name: Erica Machado  MRN: [id number]  Today's Date: 6/17/2024     Discipline: Physical Therapy    Missed Visit Reason: Missed Visit Reason:  (PT screen)    PT order received; chart reviewed. Pt. Demonstrated IND with bed mobility, transfers and amb. Good balance. Reports no concerns going home. No further acute PT needs at this time.     Comment: [phone]

## 2024-06-17 NOTE — PROGRESS NOTES
06/17/24 0948   Discharge Planning   Living Arrangements Family members  (Sister Virginia)   Support Systems Family members   Assistance Needed A & O x3; independent with ambulation  (DME:  Nebulizer, shower chair, grab bar)   Type of Residence Private residence   Number of Stairs to Enter Residence 4   Number of Stairs Within Residence 12   Do you have animals or pets at home? Yes  (Dog)   Type of Animals or Pets dog   Home or Post Acute Services None   Patient expects to be discharged to: Home   Does the patient need discharge transport arranged? No     TCC spoke with patient regarding discharge planning and home going needs. Patient lives  with her sister Virginia in a 2 story house with basement.  Patient says she is independent with ADLs and ambulation.  She doesn't drive.  Her sister does and will pick her up.  Confirmed with patient PCP is  Michael Gayle and Pharmacy is [facility].  Discharge Plan is for patient to return home.  TCC will continue to follow for changes in discharge planning needs.

## 2024-06-17 NOTE — NURSING NOTE
Pt/ sister +understanding discharge instr, aware once meds from home complete; refill will be available @  retail pharmacy.  Copy of AVS provided.

## 2024-06-17 NOTE — DISCHARGE SUMMARY
Discharge Diagnosis  Hyponatremia    Issues Requiring Follow-Up  Follow up with PCP, Neurology     Discharge Meds     Your medication list        CONTINUE taking these medications        Instructions Last Dose Given Next Dose Due   albuterol 108 (90 Base) MCG/ACT inhaler           benztropine 0.5 mg tablet  Commonly known as: Cogentin           busPIRone 15 mg tablet  Commonly known as: Buspar           desvenlafaxine 50 mg 24 hr tablet  Commonly known as: Pristiq           Dulera 200-5 mcg/actuation inhaler  Generic drug: mometasone-formoterol           fenofibrate 160 mg tablet  Commonly known as: Triglide           gabapentin 300 mg capsule  Commonly known as: Neurontin           lamoTRIgine 25 mg tablet  Commonly known as: LaMICtal      Take 3 tablets (75 mg) by mouth once daily.       levothyroxine 150 mcg tablet  Commonly known as: Synthroid, Levoxyl      Take 1 tablet (150 mcg) by mouth early in the morning..       lisinopril 40 mg tablet           meloxicam 7.5 mg tablet  Commonly known as: Mobic           mirtazapine 15 mg tablet  Commonly known as: Remeron           ondansetron 4 mg tablet  Commonly known as: Zofran           pantoprazole 40 mg EC tablet  Commonly known as: ProtoNix           prazosin 1 mg capsule  Commonly known as: Minipress           sucralfate 1 gram tablet  Commonly known as: Carafate           SUMAtriptan 50 mg tablet  Commonly known as: Imitrex           traZODone 300 mg tablet  Commonly known as: Desyrel           Ubrelvy 100 mg tablet tablet  Generic drug: ubrogepant           vilazodone 40 mg tablet  Commonly known as: Viibryd           lacosamide 50 mg tablet  Commonly known as: Vimpat      Take 1 tablet (50 mg) by mouth 2 times a day.       Zavzpret 10 mg/actuation spray,non-aerosol  Generic drug: zavegepant           zolpidem 10 mg tablet  Commonly known as: Ambien                  STOP taking these medications      cholestyramine light 4 gram packet  Commonly known as:  Prevalite        Clenpiq 10 mg-3.5 gram- 12 gram/175 mL solution  Generic drug: sod picosulf-mag ox-citric ac                  Where to Get Your Medications        These medications were sent to [facility]  [address] [place] [id number]      Hours: 9 AM to 5:30 PM Mon-Fri Phone: [phone]   lacosamide 50 mg tablet  lamoTRIgine 25 mg tablet         Test Results Pending At Discharge  Pending Labs       No current pending labs.            Hospital Course   Erica Machado is a 58 y.o. female with a past medical history of seizures, HTN, anxiety, COPD, GERD, HLD, hypothyroid, OA, schizoaffective disorder presenting with vomiting (12 episodes past 24 hrs) and LUQ abd pain, diarrhea (6-8x's)  x 3 days. She also reports a seizure 6/14/24 during which she fell and struck her head on a glass table. Pt states she averages 2-3 seizures/wk normally and she always loses consciousness and is incontinent. Pt denies fever or chills, change in medication regimen, congestion or sore throat, chest pain or sob, blood in stool or emesis. She denies neck pain or syncope apart from her seizures and she denies changes to her mental health.     Hospital Course:  Pt was admitted for dehydration and electrolyte imbalances. Electrolytes were replaced and IVFs given. Pt has been tolerating oral intake with no nausea, vomiting, or diarrhea. No seizure activity noted during hospital stay. Will continue on home seizure medications and follow up with Neurologist.     Dispo: Pt stable to discharge home. Tolerating oral intake. Pt requesting prescriptions for her seizure medications. Discussed with attending, Dr. Blackburn, who recommends prescribing 30 days worth then she will need to see her neurologist.     Total cumulative time spent in preparation of this discharge including documentation review, coordination of care with the medical team including PT/SW/care coordinators and treating consultants, discussion with patient  and pertinent family members and finalization of prescriptions, followup appointments and this discharge summary was approximately  45 minutes. Over 50% of the time was spent face-to-face counseling the patient on diagnosis, prescriptions, and follow up appointments.     Pertinent Physical Exam At Time of Discharge  Physical Exam  Vitals reviewed.   HENT:      Head: Normocephalic and atraumatic.      Right Ear: External ear normal.      Left Ear: External ear normal.      Nose: Nose normal.      Mouth/Throat:      Pharynx: Oropharynx is clear.   Eyes:      Conjunctiva/sclera: Conjunctivae normal.   Cardiovascular:      Rate and Rhythm: Normal rate and regular rhythm.      Pulses: Normal pulses.      Heart sounds: Normal heart sounds.   Pulmonary:      Effort: Pulmonary effort is normal.      Breath sounds: Normal breath sounds.   Abdominal:      General: Bowel sounds are normal.      Palpations: Abdomen is soft.   Musculoskeletal:         General: Normal range of motion.      Cervical back: Normal range of motion and neck supple.   Skin:     General: Skin is dry.   Neurological:      General: No focal deficit present.      Mental Status: She is alert and oriented to person, place, and time.   Psychiatric:         Mood and Affect: Mood normal.         Behavior: Behavior normal.         Outpatient Follow-Up  Future Appointments   Date Time Provider Department Center   7/2/2024 10:00 AM GEN [facility] 1 [facility]   7/2/2024  1:00 PM GEN [facility] 1 [facility]   9/12/2024 11:00 AM GEN PAT [facility]   10/3/2024  8:45 AM Destin Pinzon MD [facility]1 [facility]         Angela Wolff, APRN-CNP

## 2024-06-17 NOTE — NURSING NOTE
PT noted with no changes In condition. PT showing no s.s of pain,distress,or discomfort. PT noted with no seizures activity this shift. Pt safety measures in place, seizure precautions remain in place. PT safety measures in place. Will endorse to oncoming nurse to continue to monitor.

## 2024-06-17 NOTE — PROGRESS NOTES
Occupational Therapy  OT Screen/Therapy Communication Note    Patient Name: Erica Machado  MRN: [id number]  Today's Date: 6/17/2024     Discipline: Occupational Therapy    Missed Visit Reason: Missed Visit Reason: Other (Comment) (OT screen completed. Pt. dispalyed independence with bed mobility, transfers, balance, ambulation, LB dressing and toileting. Pt. displays MMT/ROM WFL. Pt. declines need for OT at this time. Pt. does not display OT needs. RN notified.)    Comment: OT screen [id number]

## 2024-06-20 LAB
ATRIAL RATE: 79 BPM
P AXIS: 56 DEGREES
P OFFSET: 182 MS
P ONSET: 134 MS
PR INTERVAL: 182 MS
Q ONSET: 225 MS
QRS COUNT: 13 BEATS
QRS DURATION: 94 MS
QT INTERVAL: 420 MS
QTC CALCULATION(BAZETT): 481 MS
QTC FREDERICIA: 460 MS
R AXIS: 7 DEGREES
T AXIS: 37 DEGREES
T OFFSET: 435 MS
VENTRICULAR RATE: 79 BPM

## 2024-06-28 ENCOUNTER — PHARMACY VISIT (OUTPATIENT)
Dept: PHARMACY | Facility: CLINIC | Age: 59
End: 2024-06-28
Payer: MEDICAID

## 2024-06-28 PROCEDURE — RXMED WILLOW AMBULATORY MEDICATION CHARGE

## 2024-07-02 ENCOUNTER — HOSPITAL ENCOUNTER (OUTPATIENT)
Dept: RADIOLOGY | Facility: HOSPITAL | Age: 59
Discharge: HOME | End: 2024-07-02
Payer: MEDICAID

## 2024-07-02 DIAGNOSIS — M17.12 UNILATERAL PRIMARY OSTEOARTHRITIS, LEFT KNEE: ICD-10-CM

## 2024-07-02 PROCEDURE — 78315 BONE IMAGING 3 PHASE: CPT

## 2024-07-02 PROCEDURE — 78315 BONE IMAGING 3 PHASE: CPT | Performed by: STUDENT IN AN ORGANIZED HEALTH CARE EDUCATION/TRAINING PROGRAM

## 2024-07-02 PROCEDURE — A9503 TC99M MEDRONATE: HCPCS | Mod: SE | Performed by: ORTHOPAEDIC SURGERY

## 2024-07-02 PROCEDURE — 3430000001 HC RX 343 DIAGNOSTIC RADIOPHARMACEUTICALS: Mod: SE | Performed by: ORTHOPAEDIC SURGERY

## 2024-07-05 PROCEDURE — RXMED WILLOW AMBULATORY MEDICATION CHARGE

## 2024-07-25 ENCOUNTER — PHARMACY VISIT (OUTPATIENT)
Dept: PHARMACY | Facility: CLINIC | Age: 59
End: 2024-07-25
Payer: MEDICAID

## 2024-07-26 ENCOUNTER — PHARMACY VISIT (OUTPATIENT)
Dept: PHARMACY | Facility: CLINIC | Age: 59
End: 2024-07-26
Payer: MEDICAID

## 2024-07-26 PROCEDURE — RXMED WILLOW AMBULATORY MEDICATION CHARGE

## 2024-09-04 ENCOUNTER — PHARMACY VISIT (OUTPATIENT)
Dept: PHARMACY | Facility: CLINIC | Age: 59
End: 2024-09-04
Payer: MEDICAID

## 2024-09-04 DIAGNOSIS — Z12.11 SCREEN FOR COLON CANCER: ICD-10-CM

## 2024-09-04 PROCEDURE — RXMED WILLOW AMBULATORY MEDICATION CHARGE

## 2024-09-04 RX ORDER — SODIUM PICOSULFATE, MAGNESIUM OXIDE, AND ANHYDROUS CITRIC ACID 12; 3.5; 1 G/175ML; G/175ML; MG/175ML
2 LIQUID ORAL AS NEEDED
Qty: 350 ML | Refills: 0 | Status: SHIPPED | OUTPATIENT
Start: 2024-09-04

## 2024-09-12 ENCOUNTER — ANESTHESIA EVENT (OUTPATIENT)
Dept: GASTROENTEROLOGY | Facility: HOSPITAL | Age: 59
End: 2024-09-12

## 2024-09-12 ENCOUNTER — PRE-ADMISSION TESTING (OUTPATIENT)
Dept: PREADMISSION TESTING | Facility: HOSPITAL | Age: 59
End: 2024-09-12
Payer: MEDICAID

## 2024-09-12 NOTE — ANESTHESIA PREPROCEDURE EVALUATION
Patient: Erica Machado    Procedure Information       Date/Time: 10/03/24 0900    Scheduled providers: Destin Pinzon MD    Procedure: COLONOSCOPY    Location: [facility]            Relevant Problems   Anesthesia (within normal limits)      Cardiac (within normal limits)   (+) HLD (hyperlipidemia)   (+) Hypertension      Pulmonary (within normal limits)   (+) COPD (chronic obstructive pulmonary disease) (Multi)      Neuro   (+) Anxiety   (+) Migraine   (+) Seizure (Multi)      GI (within normal limits)   (+) GERD (gastroesophageal reflux disease)      /Renal   (+) Hyponatremia      Liver (within normal limits)      Endocrine (within normal limits)   (+) Hypothyroidism      Hematology (within normal limits)      Musculoskeletal (within normal limits)   (+) Osteoarthritis      HEENT (within normal limits)      ID (within normal limits)      Skin (within normal limits)      GYN (within normal limits)      Digestive   (+) Diverticulosis   (+) Functional diarrhea      Mental Health   (+) Schizoaffective disorder (Multi)     There were no vitals filed for this visit.    Past Surgical History:   Procedure Laterality Date    ENDOMETRIAL ABLATION      FOOT SURGERY  10/13/2014    Foot Surgery    FOOT SURGERY       Past Medical History:   Diagnosis Date    Anxiety     COPD (chronic obstructive pulmonary disease) (Multi)     Diverticulosis     GERD (gastroesophageal reflux disease)     HLD (hyperlipidemia)     Hypertension     Hypothyroidism     Insomnia     Migraine     Osteoarthritis     Other specified anxiety disorders 01/03/2022    Depression with anxiety    Schizoaffective disorder (Multi)     Seizure (Multi)        Current Outpatient Medications:     albuterol 108 (90 Base) MCG/ACT inhaler, 2 puffs 2 times a day., Disp: , Rfl:     benztropine (Cogentin) 0.5 mg tablet, Take 1 tablet (0.5 mg) by mouth twice a day., Disp: , Rfl:     busPIRone (Buspar) 15 mg tablet, Take 1 tablet (15 mg) by mouth 2  times a day., Disp: , Rfl:     Clenpiq 10 mg-3.5 gram- 12 gram/175 mL solution, Take 2 bottles by mouth if needed (Kit prep take one bottle at 6pm night before procedure and take second bottle at 4 am day of procedure)., Disp: 350 mL, Rfl: 0    desvenlafaxine 50 mg 24 hr tablet, Take 1 tablet (50 mg) by mouth once daily. Do not crush, chew, or split., Disp: , Rfl:     fenofibrate (Triglide) 160 mg tablet, Take 1 tablet (160 mg) by mouth once daily., Disp: , Rfl:     gabapentin (Neurontin) 300 mg capsule, Take 2 capsules (600 mg) by mouth 3 times a day., Disp: , Rfl:     lacosamide (Vimpat) 50 mg tablet, Take 1 tablet (50 mg) by mouth 2 times a day., Disp: 60 tablet, Rfl: 5    lamoTRIgine (LaMICtal) 25 mg tablet, Take 3 tablets (75 mg) by mouth once daily., Disp: 90 tablet, Rfl: 0    levothyroxine (Synthroid, Levoxyl) 150 mcg tablet, Take 1 tablet (150 mcg) by mouth early in the morning.., Disp: 30 tablet, Rfl: 0    lisinopril 40 mg tablet, Take 1 tablet (40 mg) by mouth once daily., Disp: , Rfl:     meloxicam (Mobic) 7.5 mg tablet, Take 1 tablet (7.5 mg) by mouth if needed., Disp: , Rfl:     mirtazapine (Remeron) 15 mg tablet, Take 1 tablet (15 mg) by mouth once daily at bedtime., Disp: , Rfl:     mometasone-formoterol (Dulera) 200-5 mcg/actuation inhaler, Inhale 2 puffs 2 times a day., Disp: , Rfl:     ondansetron (Zofran) 4 mg tablet, Take 1 tablet (4 mg) by mouth every 8 hours if needed for nausea or vomiting., Disp: , Rfl:     pantoprazole (ProtoNix) 40 mg EC tablet, Take 1 tablet (40 mg) by mouth once daily in the morning. Take before meals., Disp: , Rfl:     prazosin (Minipress) 1 mg capsule, Take 1 capsule (1 mg) by mouth as needed at bedtime (PTSD, flashbacks)., Disp: , Rfl:     sucralfate (Carafate) 1 gram tablet, Take 1 tablet (1 g) by mouth 2 times daily (morning and late afternoon)., Disp: , Rfl:     SUMAtriptan (Imitrex) 50 mg tablet, Take 1 tablet (50 mg) by mouth 1 time if needed for migraine. May  repeat dose once in 2 hours if no relief.  Do not exceed 2 doses in 24 hours., Disp: , Rfl:     traZODone (Desyrel) 300 mg tablet, Take 1 tablet (300 mg) by mouth once daily at bedtime., Disp: , Rfl:     ubrogepant (Ubrelvy) 100 mg tablet tablet, Take 1 tablet (100 mg) by mouth if needed (migraine)., Disp: , Rfl:     vilazodone (Viibryd) 40 mg tablet, Take 1 tablet (40 mg) by mouth once daily with breakfast., Disp: , Rfl:     zavegepant (Zavzpret) 10 mg/actuation spray,non-aerosol, Administer 10 mg into affected nostril(s) once daily as needed (as needed)., Disp: , Rfl:     zolpidem (Ambien) 10 mg tablet, Take 1 tablet (10 mg) by mouth as needed at bedtime for sleep., Disp: , Rfl:   Prior to Admission medications    Medication Sig Start Date End Date Taking? Authorizing Provider   albuterol 108 (90 Base) MCG/ACT inhaler 2 puffs 2 times a day.    Historical Provider, MD   benztropine (Cogentin) 0.5 mg tablet Take 1 tablet (0.5 mg) by mouth twice a day. 4/4/23   Historical Provider, MD   busPIRone (Buspar) 15 mg tablet Take 1 tablet (15 mg) by mouth 2 times a day. 12/12/22   Historical Provider, MD   Clenpiq 10 mg-3.5 gram- 12 gram/175 mL solution Take 2 bottles by mouth if needed (Kit prep take one bottle at 6pm night before procedure and take second bottle at 4 am day of procedure). 9/4/24   Destin Pinzon MD   desvenlafaxine 50 mg 24 hr tablet Take 1 tablet (50 mg) by mouth once daily. Do not crush, chew, or split.    Historical Provider, MD   fenofibrate (Triglide) 160 mg tablet Take 1 tablet (160 mg) by mouth once daily. 12/14/23   Historical Provider, MD   gabapentin (Neurontin) 300 mg capsule Take 2 capsules (600 mg) by mouth 3 times a day.    Historical Provider, MD   lacosamide (Vimpat) 50 mg tablet Take 1 tablet (50 mg) by mouth 2 times a day. 5/31/24      lamoTRIgine (LaMICtal) 25 mg tablet Take 3 tablets (75 mg) by mouth once daily. 6/17/24 8/24/24  MIKKI Murillo-CNP   levothyroxine (Synthroid,  Levoxyl) 150 mcg tablet Take 1 tablet (150 mcg) by mouth early in the morning.. 4/14/24 5/14/24  Osiris Suarez PA-C   lisinopril 40 mg tablet Take 1 tablet (40 mg) by mouth once daily. 3/11/24   Historical Provider, MD   meloxicam (Mobic) 7.5 mg tablet Take 1 tablet (7.5 mg) by mouth if needed.    Historical Provider, MD   mirtazapine (Remeron) 15 mg tablet Take 1 tablet (15 mg) by mouth once daily at bedtime. 11/14/23   Historical Provider, MD   mometasone-formoterol (Dulera) 200-5 mcg/actuation inhaler Inhale 2 puffs 2 times a day. 11/8/23   Historical Provider, MD   ondansetron (Zofran) 4 mg tablet Take 1 tablet (4 mg) by mouth every 8 hours if needed for nausea or vomiting.    Historical Provider, MD   pantoprazole (ProtoNix) 40 mg EC tablet Take 1 tablet (40 mg) by mouth once daily in the morning. Take before meals. 11/15/23   Historical Provider, MD   prazosin (Minipress) 1 mg capsule Take 1 capsule (1 mg) by mouth as needed at bedtime (PTSD, flashbacks). 9/7/23   Historical Provider, MD   sucralfate (Carafate) 1 gram tablet Take 1 tablet (1 g) by mouth 2 times daily (morning and late afternoon).    Historical Provider, MD   SUMAtriptan (Imitrex) 50 mg tablet Take 1 tablet (50 mg) by mouth 1 time if needed for migraine. May repeat dose once in 2 hours if no relief.  Do not exceed 2 doses in 24 hours.    Historical Provider, MD   traZODone (Desyrel) 300 mg tablet Take 1 tablet (300 mg) by mouth once daily at bedtime. 3/12/24   Historical Provider, MD   ubrogepant (Ubrelvy) 100 mg tablet tablet Take 1 tablet (100 mg) by mouth if needed (migraine). 10/9/23   Historical Provider, MD   vilazodone (Viibryd) 40 mg tablet Take 1 tablet (40 mg) by mouth once daily with breakfast. 7/5/23   Historical Provider, MD   zavegepant (Zavzpret) 10 mg/actuation spray,non-aerosol Administer 10 mg into affected nostril(s) once daily as needed (as needed).    Historical Provider, MD   zolpidem (Ambien) 10 mg tablet Take 1 tablet  (10 mg) by mouth as needed at bedtime for sleep. 4/1/24   Historical Provider, MD     Allergies   Allergen Reactions    Penicillins     Sulfa (Sulfonamide Antibiotics)      Social History     Tobacco Use    Smoking status: Every Day     Current packs/day: 0.50     Types: Cigarettes     Passive exposure: Never    Smokeless tobacco: Never   Substance Use Topics    Alcohol use: Never         Chemistry    Lab Results   Component Value Date/Time     (L) 06/17/2024 0537    K 3.5 06/17/2024 0537     06/17/2024 0537    CO2 20 (L) 06/17/2024 0537    BUN 6 06/17/2024 0537    CREATININE 0.80 06/17/2024 0537    Lab Results   Component Value Date/Time    CALCIUM 9.3 06/17/2024 0537    ALKPHOS 45 06/16/2024 0516    AST 31 06/16/2024 0516    ALT 21 06/16/2024 0516    BILITOT 0.6 06/16/2024 0516          Lab Results   Component Value Date/Time    WBC 5.3 06/17/2024 0537    HGB 11.8 (L) 06/17/2024 0537    HCT 35.1 (L) 06/17/2024 0537     06/17/2024 0537     Lab Results   Component Value Date/Time    PROTIME 10.7 10/26/2018 1503    INR 1.0 10/26/2018 1503     Encounter Date: 06/15/24   ECG 12 lead   Result Value    Ventricular Rate 79    Atrial Rate 79    AR Interval 182    QRS Duration 94    QT Interval 420    QTC Calculation(Bazett) 481    P Axis 56    R Axis 7    T Axis 37    QRS Count 13    Q Onset 225    P Onset 134    P Offset 182    T Offset 435    QTC Fredericia 460    Narrative    Normal sinus rhythm  Possible Left atrial enlargement  Incomplete right bundle branch block  Borderline ECG  When compared with ECG of 10-APR-2024 21:12,  AR interval has decreased  Vent. rate has increased BY  35 BPM  Incomplete right bundle branch block is now Present  See ED provider note for full interpretation and clinical correlation  Confirmed by Mela Dooley ([id number]) on 6/20/2024 6:23:46 PM     No results found for this or any previous visit from the past 1095 days.    Clinical information reviewed:                  Chart reviewed.  No clearances ordered.    NPO Detail:  No data recorded     PHYSICAL EXAM    Anesthesia Plan

## 2024-09-19 ENCOUNTER — HOSPITAL ENCOUNTER (EMERGENCY)
Facility: HOSPITAL | Age: 59
Discharge: ANOTHER HOSPITAL | End: 2024-09-19
Attending: EMERGENCY MEDICINE
Payer: MEDICAID

## 2024-09-19 ENCOUNTER — HOSPITAL ENCOUNTER (INPATIENT)
Facility: HOSPITAL | Age: 59
End: 2024-09-19
Attending: INTERNAL MEDICINE | Admitting: HOSPITALIST
Payer: MEDICAID

## 2024-09-19 ENCOUNTER — APPOINTMENT (OUTPATIENT)
Dept: CARDIOLOGY | Facility: HOSPITAL | Age: 59
End: 2024-09-19
Payer: MEDICAID

## 2024-09-19 ENCOUNTER — APPOINTMENT (OUTPATIENT)
Dept: RADIOLOGY | Facility: HOSPITAL | Age: 59
End: 2024-09-19
Payer: MEDICAID

## 2024-09-19 ENCOUNTER — TELEPHONE (OUTPATIENT)
Dept: EMERGENCY MEDICINE | Facility: HOSPITAL | Age: 59
End: 2024-09-19

## 2024-09-19 VITALS
TEMPERATURE: 98.6 F | BODY MASS INDEX: 28.79 KG/M2 | RESPIRATION RATE: 15 BRPM | OXYGEN SATURATION: 100 % | WEIGHT: 190 LBS | HEIGHT: 68 IN | DIASTOLIC BLOOD PRESSURE: 72 MMHG | SYSTOLIC BLOOD PRESSURE: 147 MMHG | HEART RATE: 74 BPM

## 2024-09-19 DIAGNOSIS — T39.1X1A TOXIC EFFECT OF ACETAMINOPHEN, ACCIDENTAL OR UNINTENTIONAL, INITIAL ENCOUNTER: Primary | ICD-10-CM

## 2024-09-19 DIAGNOSIS — F31.9: ICD-10-CM

## 2024-09-19 DIAGNOSIS — I15.9 SECONDARY HYPERTENSION: ICD-10-CM

## 2024-09-19 DIAGNOSIS — R56.9 SEIZURE (MULTI): Primary | ICD-10-CM

## 2024-09-19 DIAGNOSIS — G43.919 INTRACTABLE MIGRAINE WITHOUT STATUS MIGRAINOSUS, UNSPECIFIED MIGRAINE TYPE: ICD-10-CM

## 2024-09-19 DIAGNOSIS — G40.919 BREAKTHROUGH SEIZURE (MULTI): ICD-10-CM

## 2024-09-19 DIAGNOSIS — R45.851 SUICIDAL IDEATION: ICD-10-CM

## 2024-09-19 DIAGNOSIS — R51.9 NONINTRACTABLE HEADACHE, UNSPECIFIED CHRONICITY PATTERN, UNSPECIFIED HEADACHE TYPE: ICD-10-CM

## 2024-09-19 LAB
ALBUMIN SERPL BCP-MCNC: 4.6 G/DL (ref 3.4–5)
ALP SERPL-CCNC: 72 U/L (ref 33–110)
ALT SERPL W P-5'-P-CCNC: 21 U/L (ref 7–45)
AMPHETAMINES UR QL SCN: ABNORMAL
ANION GAP SERPL CALC-SCNC: 14 MMOL/L (ref 10–20)
APAP SERPL-MCNC: 14.7 UG/ML
APAP SERPL-MCNC: 43.1 UG/ML
APPEARANCE UR: CLEAR
AST SERPL W P-5'-P-CCNC: 11 U/L (ref 9–39)
ATRIAL RATE: 69 BPM
B-HCG SERPL-ACNC: 3 MIU/ML
BARBITURATES UR QL SCN: ABNORMAL
BASOPHILS # BLD AUTO: 0.08 X10*3/UL (ref 0–0.1)
BASOPHILS NFR BLD AUTO: 1.1 %
BENZODIAZ UR QL SCN: ABNORMAL
BILIRUB SERPL-MCNC: 0.3 MG/DL (ref 0–1.2)
BILIRUB UR STRIP.AUTO-MCNC: NEGATIVE MG/DL
BUN SERPL-MCNC: 12 MG/DL (ref 6–23)
BZE UR QL SCN: ABNORMAL
CALCIUM SERPL-MCNC: 9.8 MG/DL (ref 8.6–10.3)
CANNABINOIDS UR QL SCN: ABNORMAL
CARDIAC TROPONIN I PNL SERPL HS: <3 NG/L (ref 0–13)
CHLORIDE SERPL-SCNC: 101 MMOL/L (ref 98–107)
CK SERPL-CCNC: 90 U/L (ref 0–215)
CO2 SERPL-SCNC: 22 MMOL/L (ref 21–32)
COLOR UR: NORMAL
CREAT SERPL-MCNC: 0.89 MG/DL (ref 0.5–1.05)
EGFRCR SERPLBLD CKD-EPI 2021: 76 ML/MIN/1.73M*2
EOSINOPHIL # BLD AUTO: 0.3 X10*3/UL (ref 0–0.7)
EOSINOPHIL NFR BLD AUTO: 4 %
ERYTHROCYTE [DISTWIDTH] IN BLOOD BY AUTOMATED COUNT: 13.5 % (ref 11.5–14.5)
ETHANOL SERPL-MCNC: <10 MG/DL
FENTANYL+NORFENTANYL UR QL SCN: ABNORMAL
GLUCOSE SERPL-MCNC: 93 MG/DL (ref 74–99)
GLUCOSE UR STRIP.AUTO-MCNC: NORMAL MG/DL
HCT VFR BLD AUTO: 38.3 % (ref 36–46)
HGB BLD-MCNC: 13.2 G/DL (ref 12–16)
IMM GRANULOCYTES # BLD AUTO: 0.04 X10*3/UL (ref 0–0.7)
IMM GRANULOCYTES NFR BLD AUTO: 0.5 % (ref 0–0.9)
INR PPP: 1 (ref 0.9–1.1)
KETONES UR STRIP.AUTO-MCNC: NEGATIVE MG/DL
LEUKOCYTE ESTERASE UR QL STRIP.AUTO: NEGATIVE
LYMPHOCYTES # BLD AUTO: 1.64 X10*3/UL (ref 1.2–4.8)
LYMPHOCYTES NFR BLD AUTO: 21.6 %
MAGNESIUM SERPL-MCNC: 1.88 MG/DL (ref 1.6–2.4)
MCH RBC QN AUTO: 32.8 PG (ref 26–34)
MCHC RBC AUTO-ENTMCNC: 34.5 G/DL (ref 32–36)
MCV RBC AUTO: 95 FL (ref 80–100)
METHADONE UR QL SCN: ABNORMAL
MONOCYTES # BLD AUTO: 0.63 X10*3/UL (ref 0.1–1)
MONOCYTES NFR BLD AUTO: 8.3 %
NEUTROPHILS # BLD AUTO: 4.9 X10*3/UL (ref 1.2–7.7)
NEUTROPHILS NFR BLD AUTO: 64.5 %
NITRITE UR QL STRIP.AUTO: NEGATIVE
NRBC BLD-RTO: 0 /100 WBCS (ref 0–0)
OPIATES UR QL SCN: ABNORMAL
OXYCODONE+OXYMORPHONE UR QL SCN: ABNORMAL
P AXIS: 59 DEGREES
P OFFSET: 189 MS
P ONSET: 130 MS
PCP UR QL SCN: ABNORMAL
PH UR STRIP.AUTO: 6.5 [PH]
PLATELET # BLD AUTO: 338 X10*3/UL (ref 150–450)
POTASSIUM SERPL-SCNC: 4.3 MMOL/L (ref 3.5–5.3)
PR INTERVAL: 188 MS
PROLACTIN SERPL-MCNC: 9.7 UG/L (ref 3–20)
PROT SERPL-MCNC: 7.2 G/DL (ref 6.4–8.2)
PROT UR STRIP.AUTO-MCNC: NEGATIVE MG/DL
PROTHROMBIN TIME: 10.9 SECONDS (ref 9.8–12.8)
Q ONSET: 224 MS
QRS COUNT: 12 BEATS
QRS DURATION: 88 MS
QT INTERVAL: 388 MS
QTC CALCULATION(BAZETT): 415 MS
QTC FREDERICIA: 406 MS
R AXIS: 18 DEGREES
RBC # BLD AUTO: 4.02 X10*6/UL (ref 4–5.2)
RBC # UR STRIP.AUTO: NEGATIVE /UL
SALICYLATES SERPL-MCNC: <3 MG/DL
SODIUM SERPL-SCNC: 133 MMOL/L (ref 136–145)
SP GR UR STRIP.AUTO: 1.01
T AXIS: 41 DEGREES
T OFFSET: 418 MS
UROBILINOGEN UR STRIP.AUTO-MCNC: NORMAL MG/DL
VENTRICULAR RATE: 69 BPM
WBC # BLD AUTO: 7.6 X10*3/UL (ref 4.4–11.3)

## 2024-09-19 PROCEDURE — 82550 ASSAY OF CK (CPK): CPT | Performed by: NURSE PRACTITIONER

## 2024-09-19 PROCEDURE — 2500000002 HC RX 250 W HCPCS SELF ADMINISTERED DRUGS (ALT 637 FOR MEDICARE OP, ALT 636 FOR OP/ED): Performed by: REGISTERED NURSE

## 2024-09-19 PROCEDURE — 80175 DRUG SCREEN QUAN LAMOTRIGINE: CPT | Mod: GENLAB | Performed by: STUDENT IN AN ORGANIZED HEALTH CARE EDUCATION/TRAINING PROGRAM

## 2024-09-19 PROCEDURE — 71250 CT THORAX DX C-: CPT | Performed by: RADIOLOGY

## 2024-09-19 PROCEDURE — 2060000001 HC INTERMEDIATE ICU ROOM DAILY

## 2024-09-19 PROCEDURE — 84702 CHORIONIC GONADOTROPIN TEST: CPT | Performed by: EMERGENCY MEDICINE

## 2024-09-19 PROCEDURE — 36415 COLL VENOUS BLD VENIPUNCTURE: CPT | Performed by: EMERGENCY MEDICINE

## 2024-09-19 PROCEDURE — 85610 PROTHROMBIN TIME: CPT | Performed by: EMERGENCY MEDICINE

## 2024-09-19 PROCEDURE — 93005 ELECTROCARDIOGRAM TRACING: CPT

## 2024-09-19 PROCEDURE — 81003 URINALYSIS AUTO W/O SCOPE: CPT | Performed by: EMERGENCY MEDICINE

## 2024-09-19 PROCEDURE — 85025 COMPLETE CBC W/AUTO DIFF WBC: CPT | Performed by: EMERGENCY MEDICINE

## 2024-09-19 PROCEDURE — 96361 HYDRATE IV INFUSION ADD-ON: CPT

## 2024-09-19 PROCEDURE — 80053 COMPREHEN METABOLIC PANEL: CPT | Performed by: EMERGENCY MEDICINE

## 2024-09-19 PROCEDURE — 2500000001 HC RX 250 WO HCPCS SELF ADMINISTERED DRUGS (ALT 637 FOR MEDICARE OP): Performed by: NURSE PRACTITIONER

## 2024-09-19 PROCEDURE — 96365 THER/PROPH/DIAG IV INF INIT: CPT

## 2024-09-19 PROCEDURE — 80307 DRUG TEST PRSMV CHEM ANLYZR: CPT | Performed by: EMERGENCY MEDICINE

## 2024-09-19 PROCEDURE — 80183 DRUG SCRN QUANT OXCARBAZEPIN: CPT | Performed by: STUDENT IN AN ORGANIZED HEALTH CARE EDUCATION/TRAINING PROGRAM

## 2024-09-19 PROCEDURE — 80235 DRUG ASSAY LACOSAMIDE: CPT | Performed by: STUDENT IN AN ORGANIZED HEALTH CARE EDUCATION/TRAINING PROGRAM

## 2024-09-19 PROCEDURE — 84484 ASSAY OF TROPONIN QUANT: CPT | Performed by: EMERGENCY MEDICINE

## 2024-09-19 PROCEDURE — 99285 EMERGENCY DEPT VISIT HI MDM: CPT | Mod: 25

## 2024-09-19 PROCEDURE — 84146 ASSAY OF PROLACTIN: CPT | Mod: GENLAB | Performed by: EMERGENCY MEDICINE

## 2024-09-19 PROCEDURE — 2500000004 HC RX 250 GENERAL PHARMACY W/ HCPCS (ALT 636 FOR OP/ED): Mod: SE | Performed by: EMERGENCY MEDICINE

## 2024-09-19 PROCEDURE — 96375 TX/PRO/DX INJ NEW DRUG ADDON: CPT

## 2024-09-19 PROCEDURE — 70450 CT HEAD/BRAIN W/O DYE: CPT

## 2024-09-19 PROCEDURE — 80143 DRUG ASSAY ACETAMINOPHEN: CPT | Performed by: EMERGENCY MEDICINE

## 2024-09-19 PROCEDURE — 70450 CT HEAD/BRAIN W/O DYE: CPT | Performed by: RADIOLOGY

## 2024-09-19 PROCEDURE — 83735 ASSAY OF MAGNESIUM: CPT | Performed by: EMERGENCY MEDICINE

## 2024-09-19 PROCEDURE — 2500000001 HC RX 250 WO HCPCS SELF ADMINISTERED DRUGS (ALT 637 FOR MEDICARE OP): Performed by: STUDENT IN AN ORGANIZED HEALTH CARE EDUCATION/TRAINING PROGRAM

## 2024-09-19 PROCEDURE — 71250 CT THORAX DX C-: CPT

## 2024-09-19 PROCEDURE — 2500000004 HC RX 250 GENERAL PHARMACY W/ HCPCS (ALT 636 FOR OP/ED): Performed by: NURSE PRACTITIONER

## 2024-09-19 RX ORDER — KETOROLAC TROMETHAMINE 15 MG/ML
15 INJECTION, SOLUTION INTRAMUSCULAR; INTRAVENOUS ONCE
Status: COMPLETED | OUTPATIENT
Start: 2024-09-19 | End: 2024-09-19

## 2024-09-19 RX ORDER — HYDROXYZINE PAMOATE 25 MG/1
25 CAPSULE ORAL EVERY 6 HOURS PRN
Status: DISCONTINUED | OUTPATIENT
Start: 2024-09-19 | End: 2024-09-27 | Stop reason: HOSPADM

## 2024-09-19 RX ORDER — ACETAMINOPHEN 650 MG/1
650 SUPPOSITORY RECTAL EVERY 4 HOURS PRN
Status: DISCONTINUED | OUTPATIENT
Start: 2024-09-19 | End: 2024-09-19

## 2024-09-19 RX ORDER — ONDANSETRON 4 MG/1
4 TABLET, FILM COATED ORAL EVERY 8 HOURS PRN
Status: DISCONTINUED | OUTPATIENT
Start: 2024-09-19 | End: 2024-09-27 | Stop reason: HOSPADM

## 2024-09-19 RX ORDER — GABAPENTIN 300 MG/1
600 CAPSULE ORAL 3 TIMES DAILY
Status: DISCONTINUED | OUTPATIENT
Start: 2024-09-19 | End: 2024-09-27 | Stop reason: HOSPADM

## 2024-09-19 RX ORDER — LAMOTRIGINE 25 MG/1
75 TABLET ORAL DAILY
Status: DISCONTINUED | OUTPATIENT
Start: 2024-09-19 | End: 2024-09-22

## 2024-09-19 RX ORDER — BISACODYL 5 MG
10 TABLET, DELAYED RELEASE (ENTERIC COATED) ORAL DAILY PRN
Status: DISCONTINUED | OUTPATIENT
Start: 2024-09-19 | End: 2024-09-27 | Stop reason: HOSPADM

## 2024-09-19 RX ORDER — POLYETHYLENE GLYCOL 3350 17 G/17G
17 POWDER, FOR SOLUTION ORAL DAILY
Status: DISCONTINUED | OUTPATIENT
Start: 2024-09-19 | End: 2024-09-27 | Stop reason: HOSPADM

## 2024-09-19 RX ORDER — ACETAMINOPHEN 160 MG/5ML
650 SOLUTION ORAL EVERY 4 HOURS PRN
Status: DISCONTINUED | OUTPATIENT
Start: 2024-09-19 | End: 2024-09-19

## 2024-09-19 RX ORDER — OXCARBAZEPINE 300 MG/1
300 TABLET, FILM COATED ORAL 2 TIMES DAILY
Status: DISCONTINUED | OUTPATIENT
Start: 2024-09-19 | End: 2024-09-23

## 2024-09-19 RX ORDER — LISINOPRIL 40 MG/1
40 TABLET ORAL DAILY
Status: DISCONTINUED | OUTPATIENT
Start: 2024-09-19 | End: 2024-09-27 | Stop reason: HOSPADM

## 2024-09-19 RX ORDER — LEVOTHYROXINE SODIUM 150 UG/1
150 TABLET ORAL DAILY
Status: DISCONTINUED | OUTPATIENT
Start: 2024-09-20 | End: 2024-09-27 | Stop reason: HOSPADM

## 2024-09-19 RX ORDER — LACOSAMIDE 50 MG/1
50 TABLET ORAL 2 TIMES DAILY
Status: DISCONTINUED | OUTPATIENT
Start: 2024-09-19 | End: 2024-09-20

## 2024-09-19 RX ORDER — ACETAMINOPHEN 325 MG/1
650 TABLET ORAL EVERY 4 HOURS PRN
Status: DISCONTINUED | OUTPATIENT
Start: 2024-09-19 | End: 2024-09-19

## 2024-09-19 RX ORDER — ONDANSETRON HYDROCHLORIDE 2 MG/ML
4 INJECTION, SOLUTION INTRAVENOUS EVERY 8 HOURS PRN
Status: DISCONTINUED | OUTPATIENT
Start: 2024-09-19 | End: 2024-09-27 | Stop reason: HOSPADM

## 2024-09-19 RX ADMIN — ACETYLCYSTEINE 13 G: 6 INJECTION, SOLUTION INTRAVENOUS at 15:26

## 2024-09-19 RX ADMIN — KETOROLAC TROMETHAMINE 15 MG: 15 INJECTION INTRAMUSCULAR; INTRAVENOUS at 11:09

## 2024-09-19 RX ADMIN — SODIUM CHLORIDE 1000 ML: 9 INJECTION, SOLUTION INTRAVENOUS at 09:35

## 2024-09-19 SDOH — HEALTH STABILITY: MENTAL HEALTH: BEHAVIORAL HEALTH(WDL): EXCEPTIONS TO WDL

## 2024-09-19 SDOH — SOCIAL STABILITY: SOCIAL INSECURITY: DOES ANYONE TRY TO KEEP YOU FROM HAVING/CONTACTING OTHER FRIENDS OR DOING THINGS OUTSIDE YOUR HOME?: YES

## 2024-09-19 SDOH — SOCIAL STABILITY: SOCIAL INSECURITY: HAVE YOU HAD ANY THOUGHTS OF HARMING ANYONE ELSE?: YES

## 2024-09-19 SDOH — HEALTH STABILITY: MENTAL HEALTH: SUICIDE ASSESSMENT: ADULT (C-SSRS)

## 2024-09-19 SDOH — SOCIAL STABILITY: SOCIAL INSECURITY: ABUSE: ADULT

## 2024-09-19 SDOH — SOCIAL STABILITY: SOCIAL INSECURITY: ARE THERE ANY APPARENT SIGNS OF INJURIES/BEHAVIORS THAT COULD BE RELATED TO ABUSE/NEGLECT?: NO

## 2024-09-19 SDOH — HEALTH STABILITY: MENTAL HEALTH: HAVE YOU BEEN THINKING ABOUT HOW YOU MIGHT DO THIS?: YES

## 2024-09-19 SDOH — SOCIAL STABILITY: SOCIAL INSECURITY: WERE YOU ABLE TO COMPLETE ALL THE BEHAVIORAL HEALTH SCREENINGS?: YES

## 2024-09-19 SDOH — HEALTH STABILITY: MENTAL HEALTH: WAS THIS WITHIN THE PAST THREE MONTHS?: NO

## 2024-09-19 SDOH — HEALTH STABILITY: MENTAL HEALTH: BEHAVIORS/MOOD: CALM;COOPERATIVE

## 2024-09-19 SDOH — HEALTH STABILITY: MENTAL HEALTH
HAVE YOU STARTED TO WORK OUT OR WORKED OUT THE DETAILS OF HOW TO KILL YOURSELF? DO YOU INTENT TO CARRY OUT THIS PLAN?: YES

## 2024-09-19 SDOH — HEALTH STABILITY: MENTAL HEALTH: BEHAVIORS/MOOD: ANXIOUS;TEARFUL;CRYING

## 2024-09-19 SDOH — SOCIAL STABILITY: SOCIAL INSECURITY: HAVE YOU HAD THOUGHTS OF HARMING ANYONE ELSE?: NO

## 2024-09-19 SDOH — SOCIAL STABILITY: SOCIAL INSECURITY: POSSIBLE ABUSE REPORTED TO:: SOCIAL SERVICES

## 2024-09-19 SDOH — HEALTH STABILITY: MENTAL HEALTH: HAVE YOU EVER DONE ANYTHING, STARTED TO DO ANYTHING, OR PREPARED TO DO ANYTHING TO END YOUR LIFE?: YES

## 2024-09-19 SDOH — SOCIAL STABILITY: SOCIAL INSECURITY: DO YOU FEEL UNSAFE GOING BACK TO THE PLACE WHERE YOU ARE LIVING?: YES

## 2024-09-19 SDOH — HEALTH STABILITY: MENTAL HEALTH: BEHAVIORS/MOOD: ANXIOUS

## 2024-09-19 SDOH — SOCIAL STABILITY: SOCIAL INSECURITY: ARE YOU OR HAVE YOU BEEN THREATENED OR ABUSED PHYSICALLY, EMOTIONALLY, OR SEXUALLY BY ANYONE?: YES

## 2024-09-19 SDOH — HEALTH STABILITY: MENTAL HEALTH: HAVE YOU HAD THESE THOUGHTS AND HAD SOME INTENTION OF ACTING ON THEM?: YES

## 2024-09-19 SDOH — SOCIAL STABILITY: SOCIAL INSECURITY: DO YOU FEEL ANYONE HAS EXPLOITED OR TAKEN ADVANTAGE OF YOU FINANCIALLY OR OF YOUR PERSONAL PROPERTY?: NO

## 2024-09-19 SDOH — HEALTH STABILITY: MENTAL HEALTH: HAVE YOU WISHED YOU WERE DEAD OR WISHED YOU COULD GO TO SLEEP AND NOT WAKE UP?: YES

## 2024-09-19 SDOH — SOCIAL STABILITY: SOCIAL INSECURITY: HAS ANYONE EVER THREATENED TO HURT YOUR FAMILY OR YOUR PETS?: YES

## 2024-09-19 SDOH — HEALTH STABILITY: MENTAL HEALTH: HAVE YOU ACTUALLY HAD ANY THOUGHTS OF KILLING YOURSELF?: YES

## 2024-09-19 ASSESSMENT — ACTIVITIES OF DAILY LIVING (ADL)
DRESSING YOURSELF: NEEDS ASSISTANCE
WALKS IN HOME: INDEPENDENT
LACK_OF_TRANSPORTATION: PATIENT DECLINED
JUDGMENT_ADEQUATE_SAFELY_COMPLETE_DAILY_ACTIVITIES: YES
PATIENT'S MEMORY ADEQUATE TO SAFELY COMPLETE DAILY ACTIVITIES?: YES
BATHING: NEEDS ASSISTANCE
HEARING - LEFT EAR: FUNCTIONAL
ADEQUATE_TO_COMPLETE_ADL: YES
HEARING - RIGHT EAR: FUNCTIONAL
GROOMING: NEEDS ASSISTANCE
TOILETING: NEEDS ASSISTANCE
FEEDING YOURSELF: INDEPENDENT

## 2024-09-19 ASSESSMENT — PATIENT HEALTH QUESTIONNAIRE - PHQ9
2. FEELING DOWN, DEPRESSED OR HOPELESS: MORE THAN HALF THE DAYS
SUM OF ALL RESPONSES TO PHQ9 QUESTIONS 1 & 2: 4
1. LITTLE INTEREST OR PLEASURE IN DOING THINGS: MORE THAN HALF THE DAYS

## 2024-09-19 ASSESSMENT — ENCOUNTER SYMPTOMS
DYSURIA: 0
COUGH: 0
VOMITING: 0
RHINORRHEA: 0
PALPITATIONS: 0
NAUSEA: 0
DIARRHEA: 0
NUMBNESS: 0
CONSTIPATION: 0
APPETITE CHANGE: 0
HEADACHES: 1
LIGHT-HEADEDNESS: 0
FATIGUE: 0
SHORTNESS OF BREATH: 0
ABDOMINAL DISTENTION: 0
SEIZURES: 1
FEVER: 0
CHEST TIGHTNESS: 0
DIZZINESS: 0
ABDOMINAL PAIN: 0

## 2024-09-19 ASSESSMENT — PAIN SCALES - GENERAL
PAINLEVEL_OUTOF10: 5 - MODERATE PAIN
PAINLEVEL_OUTOF10: 10 - WORST POSSIBLE PAIN
PAINLEVEL_OUTOF10: 5 - MODERATE PAIN
PAINLEVEL_OUTOF10: 6

## 2024-09-19 ASSESSMENT — COGNITIVE AND FUNCTIONAL STATUS - GENERAL
MOVING FROM LYING ON BACK TO SITTING ON SIDE OF FLAT BED WITH BEDRAILS: A LITTLE
HELP NEEDED FOR BATHING: A LITTLE
WALKING IN HOSPITAL ROOM: A LITTLE
DRESSING REGULAR LOWER BODY CLOTHING: A LITTLE
STANDING UP FROM CHAIR USING ARMS: A LITTLE
STANDING UP FROM CHAIR USING ARMS: A LITTLE
MOBILITY SCORE: 18
DAILY ACTIVITIY SCORE: 18
MOBILITY SCORE: 18
PERSONAL GROOMING: A LITTLE
EATING MEALS: A LITTLE
TOILETING: A LITTLE
PATIENT BASELINE BEDBOUND: NO
HELP NEEDED FOR BATHING: A LITTLE
MOVING TO AND FROM BED TO CHAIR: A LITTLE
CLIMB 3 TO 5 STEPS WITH RAILING: A LITTLE
DRESSING REGULAR UPPER BODY CLOTHING: A LITTLE
WALKING IN HOSPITAL ROOM: A LITTLE
EATING MEALS: A LITTLE
TURNING FROM BACK TO SIDE WHILE IN FLAT BAD: A LITTLE
DRESSING REGULAR UPPER BODY CLOTHING: A LITTLE
CLIMB 3 TO 5 STEPS WITH RAILING: A LITTLE
DRESSING REGULAR LOWER BODY CLOTHING: A LITTLE
DAILY ACTIVITIY SCORE: 18
TURNING FROM BACK TO SIDE WHILE IN FLAT BAD: A LITTLE
MOVING TO AND FROM BED TO CHAIR: A LITTLE
PERSONAL GROOMING: A LITTLE
TOILETING: A LITTLE
MOVING FROM LYING ON BACK TO SITTING ON SIDE OF FLAT BED WITH BEDRAILS: A LITTLE

## 2024-09-19 ASSESSMENT — PAIN DESCRIPTION - LOCATION: LOCATION: HEAD

## 2024-09-19 ASSESSMENT — COLUMBIA-SUICIDE SEVERITY RATING SCALE - C-SSRS
2. HAVE YOU ACTUALLY HAD ANY THOUGHTS OF KILLING YOURSELF?: NO
2. HAVE YOU ACTUALLY HAD ANY THOUGHTS OF KILLING YOURSELF?: YES
1. IN THE PAST MONTH, HAVE YOU WISHED YOU WERE DEAD OR WISHED YOU COULD GO TO SLEEP AND NOT WAKE UP?: YES
1. IN THE PAST MONTH, HAVE YOU WISHED YOU WERE DEAD OR WISHED YOU COULD GO TO SLEEP AND NOT WAKE UP?: NO
5. HAVE YOU STARTED TO WORK OUT OR WORKED OUT THE DETAILS OF HOW TO KILL YOURSELF? DO YOU INTEND TO CARRY OUT THIS PLAN?: YES
6. HAVE YOU EVER DONE ANYTHING, STARTED TO DO ANYTHING, OR PREPARED TO DO ANYTHING TO END YOUR LIFE?: YES
4. HAVE YOU HAD THESE THOUGHTS AND HAD SOME INTENTION OF ACTING ON THEM?: YES
6. HAVE YOU EVER DONE ANYTHING, STARTED TO DO ANYTHING, OR PREPARED TO DO ANYTHING TO END YOUR LIFE?: NO
1. IN THE PAST MONTH, HAVE YOU WISHED YOU WERE DEAD OR WISHED YOU COULD GO TO SLEEP AND NOT WAKE UP?: YES
2. HAVE YOU ACTUALLY HAD ANY THOUGHTS OF KILLING YOURSELF?: YES
5. HAVE YOU STARTED TO WORK OUT OR WORKED OUT THE DETAILS OF HOW TO KILL YOURSELF? DO YOU INTEND TO CARRY OUT THIS PLAN?: YES
6. HAVE YOU EVER DONE ANYTHING, STARTED TO DO ANYTHING, OR PREPARED TO DO ANYTHING TO END YOUR LIFE?: NO
4. HAVE YOU HAD THESE THOUGHTS AND HAD SOME INTENTION OF ACTING ON THEM?: YES
6. HAVE YOU EVER DONE ANYTHING, STARTED TO DO ANYTHING, OR PREPARED TO DO ANYTHING TO END YOUR LIFE?: YES
6. HAVE YOU EVER DONE ANYTHING, STARTED TO DO ANYTHING, OR PREPARED TO DO ANYTHING TO END YOUR LIFE?: NO

## 2024-09-19 ASSESSMENT — LIFESTYLE VARIABLES
AUDIT-C TOTAL SCORE: 1
SKIP TO QUESTIONS 9-10: 1
AUDIT-C TOTAL SCORE: 1
SUBSTANCE_ABUSE_PAST_12_MONTHS: NO
PRESCIPTION_ABUSE_PAST_12_MONTHS: YES
HOW OFTEN DO YOU HAVE A DRINK CONTAINING ALCOHOL: MONTHLY OR LESS
HOW MANY STANDARD DRINKS CONTAINING ALCOHOL DO YOU HAVE ON A TYPICAL DAY: 1 OR 2
HOW OFTEN DO YOU HAVE 6 OR MORE DRINKS ON ONE OCCASION: NEVER

## 2024-09-19 ASSESSMENT — PAIN - FUNCTIONAL ASSESSMENT
PAIN_FUNCTIONAL_ASSESSMENT: 0-10

## 2024-09-19 ASSESSMENT — PAIN DESCRIPTION - DESCRIPTORS: DESCRIPTORS: ACHING;HEAVINESS

## 2024-09-19 NOTE — ED NOTES
[facility]. States she does reccomend medicating pt with full course of NAC and repeating tylenol level and liver function tests 2 hours prior to finishing the 16 hour maintenance dose. MD made aware      Jessie Burleson RN  09/19/24 0181

## 2024-09-19 NOTE — ED TRIAGE NOTES
Pt states having a seizure after awakening this AM, fell from her bed to the ground. Pt states having a severe headache, but states the headache started before the seizure. Pt takes 50mg vimpat 3 times daily. Pt is very anxious upon arrival and tearful when asked any questions
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
## 2024-09-19 NOTE — ED PROVIDER NOTES
HPI   Chief Complaint   Patient presents with    Seizures    Headache     Pt states having a seizure after awakening this AM, fell from her bed to the ground. Pt states having a severe headache, but states the headache started before the seizure. Pt takes 50mg vimpat 3 times daily. Pt is very anxious upon arrival and tearful when asked any questions       HPI  Patient is a 58-year-old female with history of seizures for which she takes Vimpat, as well as history of bipolar and schizoaffective disorder, brought to the ED today via EMS for a breakthrough seizure.  Patient explains that upon waking up this morning, she had a headache.  She notes that she has had intermittent ongoing headache for the past 2 weeks after which she has been taking her home Fioricet and Tylenol.  Shortly after waking up, she had a generalized tonic-clonic seizure witnessed by her sister at home.  Patient states that the seizure lasted less than 1 minute before self resolving.  Patient did not require any intervention to terminate the seizure.  Sister then called EMS to bring patient here to the ED for further evaluation.  At this time, patient complains of a continued headache.  She otherwise denies any focal numbness, weakness, or tingling.  She denies any recent chest pain or shortness of breath.  She has no additional concerns.      Patient History   Past Medical History:   Diagnosis Date    Anxiety     COPD (chronic obstructive pulmonary disease) (Multi)     Diverticulosis     GERD (gastroesophageal reflux disease)     HLD (hyperlipidemia)     Hypertension     Hypothyroidism     Insomnia     Migraine     Osteoarthritis     Other specified anxiety disorders 01/03/2022    Depression with anxiety    Schizoaffective disorder (Multi)     Seizure (Multi)      Past Surgical History:   Procedure Laterality Date    ENDOMETRIAL ABLATION      FOOT SURGERY  10/13/2014    Foot Surgery    FOOT SURGERY       Family History   Problem Relation Name Age  of Onset    Diabetes Mother      Emphysema Mother      Emphysema Father       Social History     Tobacco Use    Smoking status: Every Day     Current packs/day: 0.50     Types: Cigarettes     Passive exposure: Never    Smokeless tobacco: Never   Vaping Use    Vaping status: Never Used   Substance Use Topics    Alcohol use: Never    Drug use: Never       Physical Exam   ED Triage Vitals [09/19/24 0858]   Temperature Heart Rate Respirations BP   37 °C (98.6 °F) 72 (!) 22 (!) 150/111      Pulse Ox Temp Source Heart Rate Source Patient Position   99 % Temporal -- --      BP Location FiO2 (%)     -- --       Physical Exam  Vitals and nursing note reviewed.   Constitutional:       General: She is not in acute distress.     Appearance: She is not toxic-appearing.   HENT:      Head: Normocephalic and atraumatic.      Mouth/Throat:      Mouth: Mucous membranes are moist.   Eyes:      Extraocular Movements: Extraocular movements intact.      Conjunctiva/sclera: Conjunctivae normal.   Cardiovascular:      Rate and Rhythm: Normal rate and regular rhythm.   Pulmonary:      Effort: Pulmonary effort is normal. No respiratory distress.      Breath sounds: Normal breath sounds. No wheezing.   Abdominal:      General: There is no distension.      Palpations: Abdomen is soft.      Tenderness: There is no abdominal tenderness.   Musculoskeletal:         General: No swelling.      Cervical back: Neck supple.   Skin:     General: Skin is warm and dry.      Capillary Refill: Capillary refill takes less than 2 seconds.   Neurological:      General: No focal deficit present.      Mental Status: She is alert. Mental status is at baseline.      Comments: This patient is awake, alert and oriented to person, place and time. Speech is clear and fluent. Cranial nerves II-XII are grossly intact. Strength and sensation are intact in all extremities. No pronator drift.            ED Course & MDM   ED Course as of 09/19/24 1542   u Sep 19, 2024    0935 EKG obtained at 858, interpreted by myself.  Normal sinus rhythm with a ventricular rate of 69, no axis deviation, normal intervals, with no acute ischemic changes [VT]      ED Course User Index  [VT] Reema ALVAREZ MD         Diagnoses as of 09/19/24 1542   Toxic effect of acetaminophen, accidental or unintentional, initial encounter   Breakthrough seizure (Multi)   Suicidal ideation   Nonintractable headache, unspecified chronicity pattern, unspecified headache type                 No data recorded     [place] Coma Scale Score: 15 (09/19/24 0901 : Jessie Burleson RN)                           Medical Decision Making  Patient was seen and evaluated for headache and breakthrough seizure.  On arrival, vital signs are unremarkable.  Patient has no focal neurological deficits on exam.  Patient is not postictal, and is back to her baseline mental status.  Patient is placed in seizure precautions.  Additional lab work and imaging are ordered for further evaluation of the patient's symptoms.  While here, patient also noted that she has felt suicidal for the past 2 days.  Her plan would be to either overdose or walk in front of a car.  She otherwise denies any intentional suicide attempts over the past 2 days.  Peripheral IV is established and patient was started on a 1 L bolus of normal saline.  She is also administered Toradol for her headache.    CBC is unremarkable.  CMP shows mild hyponatremia with a sodium of 133, otherwise unremarkable.  Magnesium is normal at 1.8.  High-sensitivity troponin is negative.  Acute tox panel shows elevated acetaminophen level of 43.  When asked more specifically about patient's Tylenol consumption, she admits to taking both Fioricet and Tylenol over the past couple of weeks for her headache.  She denies any intention to hurt herself, but is unable to recall how much of each medication she is taking.  We did discuss the case with poison control, who recommended that patient be  initiated on acetylcysteine given the unclear history and amount of intake.  Patient otherwise does not have any abdominal pain.  LFTs are unremarkable.  Coags are unremarkable.    CT head wo IV contrast   Final Result   No evidence of acute cortical infarct or intracranial hemorrhage.                  MACRO:   None             Signed by: Jeremie Bush 9/19/2024 10:59 AM   Dictation workstation:   [id number]      CT chest wo IV contrast   Final Result   4 mm pulmonary nodule at the base of right lower lobe unchanged since   the prior study.        Old fracture of the manubrium with healing subacute fractures of the   left 3rd through 6th ribs.        MACRO:   Incidental Finding:  A non-calcified pulmonary nodule/multiple   non-calcified pulmonary nodules measuring less than 6 mm, likely   benign.  (**-YCF-**)        Instructions:  No further follow-up is required, however, if the   patient has high risk factors for primary lung malignancy, follow-up   noncontrast CT scan chest in 12 months may be obtained. (Doyle Tellez et al., Guidelines for management of incidental pulmonary   nodules detected on CT images: From the Fleischner Society 2017,   Radiology. 2017 Jul;284 (1):228-243.) [email or web address]        Signed by: Megan Norris 9/19/2024 11:25 AM   Dictation workstation:   [id number]        Patient was informed of their lab and imaging results, and all questions and concerns were answered.  Given breakthrough seizure as well as acetaminophen toxicity, transfer planning for further management was discussed at this time, to which the patient was agreeable.  Case was initially discussed with Dr. Lewis, neurology at [facility], [place] [place] by [facility]. Dr. Lewis recommends transfer to [facility] [place] [place] [facility] for further management and neurology evaluation.  I then discussed the case with Dr. Boyer, hospitalist at [facility], who accepts patient for transfer.    Procedure  Procedures     Reema ALVAREZ MD  09/19/24 6125
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
## 2024-09-19 NOTE — DISCHARGE INSTRUCTIONS
CT chest showed 4mm pulmonary nodule stable from prior imaging. Follow up with primary care physician outpatient, recommend repeat CT in 12 months if patient has further risk factors.     For Mental Health needs use safety plan, 911, and local mental health crisis lines

## 2024-09-20 ENCOUNTER — APPOINTMENT (OUTPATIENT)
Dept: NEUROLOGY | Facility: HOSPITAL | Age: 59
End: 2024-09-20
Payer: MEDICAID

## 2024-09-20 ENCOUNTER — APPOINTMENT (OUTPATIENT)
Dept: CARDIOLOGY | Facility: HOSPITAL | Age: 59
End: 2024-09-20
Payer: MEDICAID

## 2024-09-20 PROBLEM — F31.9: Status: ACTIVE | Noted: 2024-09-20

## 2024-09-20 PROBLEM — R45.851 SUICIDAL IDEATION: Status: ACTIVE | Noted: 2024-09-20

## 2024-09-20 PROBLEM — T14.91XA SUICIDE ATTEMPT (MULTI): Status: ACTIVE | Noted: 2024-09-20

## 2024-09-20 PROBLEM — T39.1X1A TYLENOL OVERDOSE: Status: ACTIVE | Noted: 2024-09-20

## 2024-09-20 PROBLEM — E03.9 HYPOTHYROID: Status: ACTIVE | Noted: 2024-09-20

## 2024-09-20 PROBLEM — Z60.8: Status: ACTIVE | Noted: 2024-09-20

## 2024-09-20 LAB
ALBUMIN SERPL BCP-MCNC: 4.2 G/DL (ref 3.4–5)
ALP SERPL-CCNC: 61 U/L (ref 33–110)
ALT SERPL W P-5'-P-CCNC: 16 U/L (ref 7–45)
ANION GAP SERPL CALCULATED.3IONS-SCNC: 14 MMOL/L (ref 10–20)
APAP SERPL-MCNC: <10 UG/ML
AST SERPL W P-5'-P-CCNC: 9 U/L (ref 9–39)
BILIRUB DIRECT SERPL-MCNC: 0.1 MG/DL (ref 0–0.3)
BILIRUB SERPL-MCNC: 0.3 MG/DL (ref 0–1.2)
BUN SERPL-MCNC: 11 MG/DL (ref 6–23)
CALCIUM SERPL-MCNC: 8.6 MG/DL (ref 8.6–10.3)
CHLORIDE SERPL-SCNC: 101 MMOL/L (ref 98–107)
CO2 SERPL-SCNC: 20 MMOL/L (ref 21–32)
CREAT SERPL-MCNC: 0.88 MG/DL (ref 0.5–1.05)
EGFRCR SERPLBLD CKD-EPI 2021: 77 ML/MIN/1.73M*2
ERYTHROCYTE [DISTWIDTH] IN BLOOD BY AUTOMATED COUNT: 13.8 % (ref 11.5–14.5)
GLUCOSE SERPL-MCNC: 113 MG/DL (ref 74–99)
HCT VFR BLD AUTO: 37.2 % (ref 36–46)
HGB BLD-MCNC: 12.5 G/DL (ref 12–16)
LAMOTRIGINE SERPL-MCNC: <1 UG/ML (ref 2.5–15)
MCH RBC QN AUTO: 32.1 PG (ref 26–34)
MCHC RBC AUTO-ENTMCNC: 33.6 G/DL (ref 32–36)
MCV RBC AUTO: 96 FL (ref 80–100)
NRBC BLD-RTO: 0 /100 WBCS (ref 0–0)
PLATELET # BLD AUTO: 273 X10*3/UL (ref 150–450)
POTASSIUM SERPL-SCNC: 3.6 MMOL/L (ref 3.5–5.3)
PROT SERPL-MCNC: 6.7 G/DL (ref 6.4–8.2)
RBC # BLD AUTO: 3.89 X10*6/UL (ref 4–5.2)
SODIUM SERPL-SCNC: 131 MMOL/L (ref 136–145)
WBC # BLD AUTO: 8 X10*3/UL (ref 4.4–11.3)

## 2024-09-20 PROCEDURE — 85027 COMPLETE CBC AUTOMATED: CPT | Performed by: NURSE PRACTITIONER

## 2024-09-20 PROCEDURE — 80048 BASIC METABOLIC PNL TOTAL CA: CPT | Performed by: NURSE PRACTITIONER

## 2024-09-20 PROCEDURE — 80143 DRUG ASSAY ACETAMINOPHEN: CPT | Performed by: NURSE PRACTITIONER

## 2024-09-20 PROCEDURE — 82248 BILIRUBIN DIRECT: CPT | Performed by: NURSE PRACTITIONER

## 2024-09-20 PROCEDURE — 95819 EEG AWAKE AND ASLEEP: CPT | Performed by: PSYCHIATRY & NEUROLOGY

## 2024-09-20 PROCEDURE — 2060000001 HC INTERMEDIATE ICU ROOM DAILY

## 2024-09-20 PROCEDURE — 2500000001 HC RX 250 WO HCPCS SELF ADMINISTERED DRUGS (ALT 637 FOR MEDICARE OP): Performed by: STUDENT IN AN ORGANIZED HEALTH CARE EDUCATION/TRAINING PROGRAM

## 2024-09-20 PROCEDURE — 36415 COLL VENOUS BLD VENIPUNCTURE: CPT | Performed by: NURSE PRACTITIONER

## 2024-09-20 PROCEDURE — 2500000004 HC RX 250 GENERAL PHARMACY W/ HCPCS (ALT 636 FOR OP/ED): Performed by: STUDENT IN AN ORGANIZED HEALTH CARE EDUCATION/TRAINING PROGRAM

## 2024-09-20 PROCEDURE — 99223 1ST HOSP IP/OBS HIGH 75: CPT

## 2024-09-20 PROCEDURE — 95819 EEG AWAKE AND ASLEEP: CPT

## 2024-09-20 PROCEDURE — 2500000002 HC RX 250 W HCPCS SELF ADMINISTERED DRUGS (ALT 637 FOR MEDICARE OP, ALT 636 FOR OP/ED): Performed by: REGISTERED NURSE

## 2024-09-20 PROCEDURE — 99255 IP/OBS CONSLTJ NEW/EST HI 80: CPT | Performed by: STUDENT IN AN ORGANIZED HEALTH CARE EDUCATION/TRAINING PROGRAM

## 2024-09-20 PROCEDURE — 2500000001 HC RX 250 WO HCPCS SELF ADMINISTERED DRUGS (ALT 637 FOR MEDICARE OP)

## 2024-09-20 PROCEDURE — 2500000001 HC RX 250 WO HCPCS SELF ADMINISTERED DRUGS (ALT 637 FOR MEDICARE OP): Performed by: NURSE PRACTITIONER

## 2024-09-20 PROCEDURE — 93005 ELECTROCARDIOGRAM TRACING: CPT

## 2024-09-20 RX ORDER — DULOXETIN HYDROCHLORIDE 30 MG/1
30 CAPSULE, DELAYED RELEASE ORAL DAILY
Status: DISCONTINUED | OUTPATIENT
Start: 2024-09-20 | End: 2024-09-27 | Stop reason: HOSPADM

## 2024-09-20 RX ORDER — TRAZODONE HYDROCHLORIDE 100 MG/1
300 TABLET ORAL NIGHTLY
Status: DISCONTINUED | OUTPATIENT
Start: 2024-09-20 | End: 2024-09-27 | Stop reason: HOSPADM

## 2024-09-20 RX ORDER — LABETALOL HYDROCHLORIDE 5 MG/ML
10 INJECTION, SOLUTION INTRAVENOUS ONCE
Status: COMPLETED | OUTPATIENT
Start: 2024-09-20 | End: 2024-09-20

## 2024-09-20 RX ORDER — LACOSAMIDE 100 MG/1
100 TABLET ORAL 2 TIMES DAILY
Status: DISCONTINUED | OUTPATIENT
Start: 2024-09-20 | End: 2024-09-23

## 2024-09-20 RX ORDER — MIRTAZAPINE 15 MG/1
15 TABLET, FILM COATED ORAL NIGHTLY
Status: DISCONTINUED | OUTPATIENT
Start: 2024-09-20 | End: 2024-09-27 | Stop reason: HOSPADM

## 2024-09-20 RX ORDER — KETOROLAC TROMETHAMINE 30 MG/ML
30 INJECTION, SOLUTION INTRAMUSCULAR; INTRAVENOUS ONCE
Status: COMPLETED | OUTPATIENT
Start: 2024-09-20 | End: 2024-09-20

## 2024-09-20 RX ORDER — BUSPIRONE HYDROCHLORIDE 15 MG/1
15 TABLET ORAL 2 TIMES DAILY
Status: DISCONTINUED | OUTPATIENT
Start: 2024-09-20 | End: 2024-09-27 | Stop reason: HOSPADM

## 2024-09-20 RX ORDER — DIPHENHYDRAMINE HYDROCHLORIDE 50 MG/ML
25 INJECTION INTRAMUSCULAR; INTRAVENOUS ONCE
Status: COMPLETED | OUTPATIENT
Start: 2024-09-20 | End: 2024-09-20

## 2024-09-20 RX ORDER — MAGNESIUM SULFATE 1 G/100ML
1 INJECTION INTRAVENOUS ONCE
Status: COMPLETED | OUTPATIENT
Start: 2024-09-20 | End: 2024-09-20

## 2024-09-20 ASSESSMENT — COGNITIVE AND FUNCTIONAL STATUS - GENERAL
STANDING UP FROM CHAIR USING ARMS: A LITTLE
TOILETING: A LITTLE
MOVING FROM LYING ON BACK TO SITTING ON SIDE OF FLAT BED WITH BEDRAILS: A LITTLE
STANDING UP FROM CHAIR USING ARMS: A LITTLE
MOVING TO AND FROM BED TO CHAIR: A LITTLE
TURNING FROM BACK TO SIDE WHILE IN FLAT BAD: A LITTLE
DAILY ACTIVITIY SCORE: 20
CLIMB 3 TO 5 STEPS WITH RAILING: A LITTLE
MOVING FROM LYING ON BACK TO SITTING ON SIDE OF FLAT BED WITH BEDRAILS: A LITTLE
DAILY ACTIVITIY SCORE: 19
DRESSING REGULAR UPPER BODY CLOTHING: A LITTLE
HELP NEEDED FOR BATHING: A LITTLE
WALKING IN HOSPITAL ROOM: A LITTLE
TOILETING: A LITTLE
MOVING TO AND FROM BED TO CHAIR: A LITTLE
DRESSING REGULAR LOWER BODY CLOTHING: A LITTLE
PERSONAL GROOMING: A LITTLE
HELP NEEDED FOR BATHING: A LITTLE
DRESSING REGULAR LOWER BODY CLOTHING: A LITTLE
PERSONAL GROOMING: A LITTLE
WALKING IN HOSPITAL ROOM: A LITTLE
MOBILITY SCORE: 18
CLIMB 3 TO 5 STEPS WITH RAILING: A LITTLE
TURNING FROM BACK TO SIDE WHILE IN FLAT BAD: A LITTLE
MOBILITY SCORE: 18

## 2024-09-20 ASSESSMENT — PAIN SCALES - GENERAL
PAINLEVEL_OUTOF10: 2
PAINLEVEL_OUTOF10: 3
PAINLEVEL_OUTOF10: 7

## 2024-09-20 ASSESSMENT — PAIN DESCRIPTION - DESCRIPTORS
DESCRIPTORS: ACHING

## 2024-09-20 ASSESSMENT — PAIN - FUNCTIONAL ASSESSMENT
PAIN_FUNCTIONAL_ASSESSMENT: 0-10

## 2024-09-20 NOTE — ASSESSMENT & PLAN NOTE
Patient took an unknown amount of tylenol at an attempt at suicide  Tylenol level 43.1, 14.7, now undetectable  Acetadote three bags completed  LFTs normal

## 2024-09-20 NOTE — ASSESSMENT & PLAN NOTE
Hx of Bipolar/schizoaffective disorder/anxiety/depression  Psych medications resumed  Psych consult, patient will need inpatient psych  Lacks capacity per psych to sign out AMA

## 2024-09-20 NOTE — ASSESSMENT & PLAN NOTE
Noted tonic clonic seizure at home lasting about 1 minute  States that her last seizure was four months ago  Does report a head injury four days ago  CT head showed no acute process  Continue anti seizure medications per neurology recs  Seizure precautions  Monitor on tele  EEG pending results

## 2024-09-20 NOTE — NURSING NOTE
0700 received bedside report.  Pt awake, sitter at bedside.   0830 administered morning meds. Pt swallowed whole with water.  Performed assessment and documented per flow sheet.  Pt transported with sitter for EEG.  1015 pt back from EEG. Reconnected to IV.  1025 pt tearful/crying asking if we are going to help her get into a shelter or if we are going to kick her out on the street.  Assured pt that we will do what we can to help her.    1340 pts sister called and stated that pt is not welcome in her home anymore.  The sister said that she is incapable of taking care of her anymore and Erica would need to find other arrangements.

## 2024-09-20 NOTE — NURSING NOTE
Patient adamant that the only people to be contacted about her care are Magdy rice and Morgan who are listed in her emergency contacts
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
## 2024-09-20 NOTE — PROGRESS NOTES
09/20/24 1403   Discharge Planning   Expected Discharge Disposition Psych     Plan is for inpatient psych placement once medically clear by EPAT     No needs from care transitions at this time
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
## 2024-09-20 NOTE — NURSING NOTE
Assumed care of patient  Bedside report with Kerri ESCOBAR  Sitter at bedside  Seizure precautions and suicide precautions  Bed low and locked  Bed alarm on
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
## 2024-09-21 LAB
ALBUMIN SERPL BCP-MCNC: 3.9 G/DL (ref 3.4–5)
ALP SERPL-CCNC: 53 U/L (ref 33–110)
ALT SERPL W P-5'-P-CCNC: 14 U/L (ref 7–45)
ANION GAP SERPL CALCULATED.3IONS-SCNC: 13 MMOL/L (ref 10–20)
AST SERPL W P-5'-P-CCNC: 10 U/L (ref 9–39)
BILIRUB SERPL-MCNC: 0.3 MG/DL (ref 0–1.2)
BUN SERPL-MCNC: 12 MG/DL (ref 6–23)
CALCIUM SERPL-MCNC: 8.7 MG/DL (ref 8.6–10.3)
CHLORIDE SERPL-SCNC: 103 MMOL/L (ref 98–107)
CO2 SERPL-SCNC: 20 MMOL/L (ref 21–32)
CREAT SERPL-MCNC: 0.78 MG/DL (ref 0.5–1.05)
EGFRCR SERPLBLD CKD-EPI 2021: 89 ML/MIN/1.73M*2
ERYTHROCYTE [DISTWIDTH] IN BLOOD BY AUTOMATED COUNT: 13.9 % (ref 11.5–14.5)
GLUCOSE SERPL-MCNC: 104 MG/DL (ref 74–99)
HCT VFR BLD AUTO: 33.9 % (ref 36–46)
HGB BLD-MCNC: 11.1 G/DL (ref 12–16)
MCH RBC QN AUTO: 31.8 PG (ref 26–34)
MCHC RBC AUTO-ENTMCNC: 32.7 G/DL (ref 32–36)
MCV RBC AUTO: 97 FL (ref 80–100)
NRBC BLD-RTO: 0 /100 WBCS (ref 0–0)
PLATELET # BLD AUTO: 266 X10*3/UL (ref 150–450)
POTASSIUM SERPL-SCNC: 4.5 MMOL/L (ref 3.5–5.3)
PROT SERPL-MCNC: 6.1 G/DL (ref 6.4–8.2)
RBC # BLD AUTO: 3.49 X10*6/UL (ref 4–5.2)
SODIUM SERPL-SCNC: 131 MMOL/L (ref 136–145)
WBC # BLD AUTO: 6.3 X10*3/UL (ref 4.4–11.3)

## 2024-09-21 PROCEDURE — 2500000004 HC RX 250 GENERAL PHARMACY W/ HCPCS (ALT 636 FOR OP/ED): Performed by: STUDENT IN AN ORGANIZED HEALTH CARE EDUCATION/TRAINING PROGRAM

## 2024-09-21 PROCEDURE — 2500000001 HC RX 250 WO HCPCS SELF ADMINISTERED DRUGS (ALT 637 FOR MEDICARE OP): Performed by: NURSE PRACTITIONER

## 2024-09-21 PROCEDURE — 36415 COLL VENOUS BLD VENIPUNCTURE: CPT | Performed by: NURSE PRACTITIONER

## 2024-09-21 PROCEDURE — 2060000001 HC INTERMEDIATE ICU ROOM DAILY

## 2024-09-21 PROCEDURE — 2500000004 HC RX 250 GENERAL PHARMACY W/ HCPCS (ALT 636 FOR OP/ED): Performed by: NURSE PRACTITIONER

## 2024-09-21 PROCEDURE — 2500000005 HC RX 250 GENERAL PHARMACY W/O HCPCS: Performed by: NURSE PRACTITIONER

## 2024-09-21 PROCEDURE — 2500000002 HC RX 250 W HCPCS SELF ADMINISTERED DRUGS (ALT 637 FOR MEDICARE OP, ALT 636 FOR OP/ED): Performed by: REGISTERED NURSE

## 2024-09-21 PROCEDURE — 80053 COMPREHEN METABOLIC PANEL: CPT | Performed by: NURSE PRACTITIONER

## 2024-09-21 PROCEDURE — 2500000001 HC RX 250 WO HCPCS SELF ADMINISTERED DRUGS (ALT 637 FOR MEDICARE OP)

## 2024-09-21 PROCEDURE — 85027 COMPLETE CBC AUTOMATED: CPT | Performed by: NURSE PRACTITIONER

## 2024-09-21 PROCEDURE — 2500000001 HC RX 250 WO HCPCS SELF ADMINISTERED DRUGS (ALT 637 FOR MEDICARE OP): Performed by: STUDENT IN AN ORGANIZED HEALTH CARE EDUCATION/TRAINING PROGRAM

## 2024-09-21 PROCEDURE — 99232 SBSQ HOSP IP/OBS MODERATE 35: CPT | Performed by: STUDENT IN AN ORGANIZED HEALTH CARE EDUCATION/TRAINING PROGRAM

## 2024-09-21 RX ORDER — DIPHENHYDRAMINE HYDROCHLORIDE 50 MG/ML
50 INJECTION INTRAMUSCULAR; INTRAVENOUS ONCE
Status: COMPLETED | OUTPATIENT
Start: 2024-09-21 | End: 2024-09-21

## 2024-09-21 RX ORDER — DIPHENHYDRAMINE HYDROCHLORIDE 50 MG/ML
25 INJECTION INTRAMUSCULAR; INTRAVENOUS ONCE
Status: COMPLETED | OUTPATIENT
Start: 2024-09-21 | End: 2024-09-21

## 2024-09-21 RX ORDER — KETOROLAC TROMETHAMINE 30 MG/ML
30 INJECTION, SOLUTION INTRAMUSCULAR; INTRAVENOUS ONCE
Status: COMPLETED | OUTPATIENT
Start: 2024-09-21 | End: 2024-09-21

## 2024-09-21 RX ORDER — KETOROLAC TROMETHAMINE 30 MG/ML
15 INJECTION, SOLUTION INTRAMUSCULAR; INTRAVENOUS ONCE
Status: COMPLETED | OUTPATIENT
Start: 2024-09-21 | End: 2024-09-21

## 2024-09-21 ASSESSMENT — COGNITIVE AND FUNCTIONAL STATUS - GENERAL
MOBILITY SCORE: 18
DRESSING REGULAR UPPER BODY CLOTHING: A LITTLE
TURNING FROM BACK TO SIDE WHILE IN FLAT BAD: A LITTLE
DRESSING REGULAR LOWER BODY CLOTHING: A LITTLE
DAILY ACTIVITIY SCORE: 19
MOVING TO AND FROM BED TO CHAIR: A LITTLE
CLIMB 3 TO 5 STEPS WITH RAILING: A LITTLE
STANDING UP FROM CHAIR USING ARMS: A LITTLE
TOILETING: A LITTLE
PERSONAL GROOMING: A LITTLE
HELP NEEDED FOR BATHING: A LITTLE
WALKING IN HOSPITAL ROOM: A LITTLE
MOVING FROM LYING ON BACK TO SITTING ON SIDE OF FLAT BED WITH BEDRAILS: A LITTLE

## 2024-09-21 ASSESSMENT — PAIN DESCRIPTION - DESCRIPTORS: DESCRIPTORS: ACHING

## 2024-09-21 ASSESSMENT — PAIN SCALES - GENERAL
PAINLEVEL_OUTOF10: 8
PAINLEVEL_OUTOF10: 0 - NO PAIN
PAINLEVEL_OUTOF10: 9
PAINLEVEL_OUTOF10: 0 - NO PAIN

## 2024-09-21 ASSESSMENT — PAIN DESCRIPTION - LOCATION
LOCATION: HEAD
LOCATION: HEAD

## 2024-09-21 ASSESSMENT — COLUMBIA-SUICIDE SEVERITY RATING SCALE - C-SSRS
1. SINCE LAST CONTACT, HAVE YOU WISHED YOU WERE DEAD OR WISHED YOU COULD GO TO SLEEP AND NOT WAKE UP?: NO
5. HAVE YOU STARTED TO WORK OUT OR WORKED OUT THE DETAILS OF HOW TO KILL YOURSELF? DO YOU INTEND TO CARRY OUT THIS PLAN?: NO
6. HAVE YOU EVER DONE ANYTHING, STARTED TO DO ANYTHING, OR PREPARED TO DO ANYTHING TO END YOUR LIFE?: NO
6. HAVE YOU EVER DONE ANYTHING, STARTED TO DO ANYTHING, OR PREPARED TO DO ANYTHING TO END YOUR LIFE?: NO
2. HAVE YOU ACTUALLY HAD ANY THOUGHTS OF KILLING YOURSELF?: YES

## 2024-09-21 ASSESSMENT — PAIN DESCRIPTION - ORIENTATION
ORIENTATION: MID
ORIENTATION: MID

## 2024-09-21 ASSESSMENT — PAIN - FUNCTIONAL ASSESSMENT
PAIN_FUNCTIONAL_ASSESSMENT: 0-10
PAIN_FUNCTIONAL_ASSESSMENT: 0-10

## 2024-09-21 ASSESSMENT — PAIN SCALES - WONG BAKER: WONGBAKER_NUMERICALRESPONSE: HURTS LITTLE BIT

## 2024-09-21 NOTE — PROGRESS NOTES
Spiritual Care Visit    Clinical Encounter Type  Visited With: Patient  Routine Visit: Introduction  Continue Visiting: No         Values/Beliefs  Spiritual Requests During Hospitalization: Erica only wanted a blessing today    Sacramental Encounters  Communion: Does not want communion  Communion Given Indicator: No  Sacrament of Sick-Anointing: Patient declined anointing     Danny Alfred

## 2024-09-21 NOTE — CARE PLAN
The patient's goals for the shift include      The clinical goals for the shift include patient to remain safe andfree from seizure    Over the shift, the patient did not make progress toward the following goals. Barriers to progression include anxiety. Recommendations to address these barriers include therapy.

## 2024-09-21 NOTE — NURSING NOTE
Assumed care of patient  Sitter at bedside  Bed low and locked  Bed alarm on  Seizure pads on x4  Objects removed from room per suicide precautions  Patient A&Ox4  Laying in bed watching tv, quiet, respirations even

## 2024-09-21 NOTE — PROGRESS NOTES
"Erica Machado is a 58 y.o. female on day 2 of admission presenting with Seizure (Multi).      Subjective   Pt seen this am. Reports doing well. Her headache has improved by 75%.   Discussed undetectable lamotrigine level -- today she states she has not been taking medications for a long time. Emphasized that it is very important pt do not miss lamotrigine doses as resumption after a long pause can put pt at risk of deadly rashes.        Objective     Last Recorded Vitals  Blood pressure (!) 160/97, pulse 75, temperature 36.7 °C (98.1 °F), temperature source Temporal, resp. rate 14, height 1.727 m (5' 8\"), weight 92 kg (202 lb 13.2 oz), SpO2 98%.    Physical Exam  Neurological Exam  Awake, alert, sitting up in bed, in NAD  Language normal for expression, naming, comprehension  VF full   Face symmetric  Strength 5/5 x4  Sensation intact to light touch x4     Relevant Results                    [place] Coma Scale  Best Eye Response: Spontaneous  Best Verbal Response: Oriented  Best Motor Response: Follows commands  [place] Coma Scale Score: 15                             Assessment/Plan      Assessment & Plan  Seizure (Multi)    Anxiety    Suicidal ideation    Bipolar I disorder with mood-congruent psychotic features (Multi)    Hypertension    Tylenol overdose    Suicide attempt (Multi)    Hypothyroid    Problem related to nonsupportive living environment    Erica Machado is a 58 y.o. female with hx of migraines, seizures, bipolar disorder / schizoaffective disorder with anxiety presenting with headaches and seizures. Pt reports increased frequency of seizures in the setting of unsafe home environment and significant level of stress / anxiety from it. [facility]PS has been reached already from [place] [facility].     9/21: EEG done and was negative for seizures. Pt remains seizure free with home meds. Today pt reports pt was in fact non-complaint with meds -- but she is also tolerating vimpat increased dose well, would " continue at this dose for now      Dx:   Hx of seizures  Hx of migraines      Recs:  - continue lacosamide dose at 100mg BID given reports of increased frequency of seizures while we wait for lacosamide level. Today pt admits to not taking meds for a long time and breakthrough seizures may be more related to medication non-compliance before   - routine EEG was negative for seizures   - I would HOLD lamotrigine given undetectable level, pt likely needs slow titration again to avoid skin rash and SJS/TEN -- given pt is on oxcarb can resume at 50mg daily tomorrow 9/22, increase back to 75mg daily in 7 days   - continue oxcarb 300mg BID -- although per pt's outpt neurologist's note this was for psychiatric reason, given it's antiseizure properties would avoid interruptions of therapy   - repeat migraine cocktail as needed          I spent 35 minutes in the professional and overall care of this patient.      Rita Lewis MD
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
## 2024-09-21 NOTE — CARE PLAN
The patient's goals for the shift include      The clinical goals for the shift include patient less upset today    Over the shift, the patient did not make progress toward the following goals. Barriers to progression include SI ideation. Recommendations to address these barriers include therapy.

## 2024-09-21 NOTE — NURSING NOTE
Assumed care of patient  A&Ox4  Seizure and suicide precautions in place  Sitter  at  bedside  Patient having supervised phone call with family  Bed low and locked  Side rails padded  Bed alarm on
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
## 2024-09-21 NOTE — ASSESSMENT & PLAN NOTE
Noted tonic clonic seizure at home lasting about 1 minute  States that her last seizure was four months ago  Does report a head injury four days ago  CT head showed no acute process  Continue anti seizure medications per neurology recs  Seizure precautions  Monitor on tele  EEG showed no seizure activity

## 2024-09-22 VITALS
BODY MASS INDEX: 30.74 KG/M2 | OXYGEN SATURATION: 93 % | HEART RATE: 79 BPM | RESPIRATION RATE: 16 BRPM | HEIGHT: 68 IN | TEMPERATURE: 98.6 F | DIASTOLIC BLOOD PRESSURE: 81 MMHG | WEIGHT: 202.82 LBS | SYSTOLIC BLOOD PRESSURE: 161 MMHG

## 2024-09-22 LAB
ANION GAP SERPL CALCULATED.3IONS-SCNC: 13 MMOL/L (ref 10–20)
BUN SERPL-MCNC: 14 MG/DL (ref 6–23)
CALCIUM SERPL-MCNC: 8.6 MG/DL (ref 8.6–10.3)
CHLORIDE SERPL-SCNC: 98 MMOL/L (ref 98–107)
CO2 SERPL-SCNC: 20 MMOL/L (ref 21–32)
CREAT SERPL-MCNC: 0.72 MG/DL (ref 0.5–1.05)
CREAT UR-MCNC: 46.9 MG/DL (ref 20–320)
EGFRCR SERPLBLD CKD-EPI 2021: >90 ML/MIN/1.73M*2
ERYTHROCYTE [DISTWIDTH] IN BLOOD BY AUTOMATED COUNT: 14.1 % (ref 11.5–14.5)
GLUCOSE SERPL-MCNC: 95 MG/DL (ref 74–99)
HCT VFR BLD AUTO: 33.8 % (ref 36–46)
HGB BLD-MCNC: 11.1 G/DL (ref 12–16)
MCH RBC QN AUTO: 32 PG (ref 26–34)
MCHC RBC AUTO-ENTMCNC: 32.8 G/DL (ref 32–36)
MCV RBC AUTO: 97 FL (ref 80–100)
NRBC BLD-RTO: 0 /100 WBCS (ref 0–0)
OSMOLALITY SERPL: 265 MOSM/KG (ref 280–300)
PLATELET # BLD AUTO: 238 X10*3/UL (ref 150–450)
POTASSIUM SERPL-SCNC: 4.9 MMOL/L (ref 3.5–5.3)
RBC # BLD AUTO: 3.47 X10*6/UL (ref 4–5.2)
SODIUM SERPL-SCNC: 126 MMOL/L (ref 136–145)
SODIUM UR-SCNC: 128 MMOL/L
SODIUM/CREAT UR-RTO: 273 MMOL/G CREAT
WBC # BLD AUTO: 9.6 X10*3/UL (ref 4.4–11.3)

## 2024-09-22 PROCEDURE — 2500000001 HC RX 250 WO HCPCS SELF ADMINISTERED DRUGS (ALT 637 FOR MEDICARE OP)

## 2024-09-22 PROCEDURE — 82570 ASSAY OF URINE CREATININE: CPT | Performed by: NURSE PRACTITIONER

## 2024-09-22 PROCEDURE — 2500000004 HC RX 250 GENERAL PHARMACY W/ HCPCS (ALT 636 FOR OP/ED): Performed by: NURSE PRACTITIONER

## 2024-09-22 PROCEDURE — 2500000001 HC RX 250 WO HCPCS SELF ADMINISTERED DRUGS (ALT 637 FOR MEDICARE OP): Performed by: INTERNAL MEDICINE

## 2024-09-22 PROCEDURE — 2500000002 HC RX 250 W HCPCS SELF ADMINISTERED DRUGS (ALT 637 FOR MEDICARE OP, ALT 636 FOR OP/ED): Performed by: REGISTERED NURSE

## 2024-09-22 PROCEDURE — 2500000001 HC RX 250 WO HCPCS SELF ADMINISTERED DRUGS (ALT 637 FOR MEDICARE OP): Performed by: NURSE PRACTITIONER

## 2024-09-22 PROCEDURE — 83930 ASSAY OF BLOOD OSMOLALITY: CPT | Mod: WESLAB | Performed by: INTERNAL MEDICINE

## 2024-09-22 PROCEDURE — 36415 COLL VENOUS BLD VENIPUNCTURE: CPT | Performed by: NURSE PRACTITIONER

## 2024-09-22 PROCEDURE — 85027 COMPLETE CBC AUTOMATED: CPT | Performed by: NURSE PRACTITIONER

## 2024-09-22 PROCEDURE — 2500000001 HC RX 250 WO HCPCS SELF ADMINISTERED DRUGS (ALT 637 FOR MEDICARE OP): Performed by: STUDENT IN AN ORGANIZED HEALTH CARE EDUCATION/TRAINING PROGRAM

## 2024-09-22 PROCEDURE — 80048 BASIC METABOLIC PNL TOTAL CA: CPT | Performed by: NURSE PRACTITIONER

## 2024-09-22 PROCEDURE — 2060000001 HC INTERMEDIATE ICU ROOM DAILY

## 2024-09-22 PROCEDURE — 99232 SBSQ HOSP IP/OBS MODERATE 35: CPT | Performed by: STUDENT IN AN ORGANIZED HEALTH CARE EDUCATION/TRAINING PROGRAM

## 2024-09-22 PROCEDURE — 83935 ASSAY OF URINE OSMOLALITY: CPT | Mod: WESLAB | Performed by: NURSE PRACTITIONER

## 2024-09-22 RX ORDER — AMLODIPINE BESYLATE 5 MG/1
5 TABLET ORAL DAILY
Status: DISCONTINUED | OUTPATIENT
Start: 2024-09-22 | End: 2024-09-27 | Stop reason: HOSPADM

## 2024-09-22 RX ORDER — DIPHENHYDRAMINE HYDROCHLORIDE 50 MG/ML
25 INJECTION INTRAMUSCULAR; INTRAVENOUS EVERY 8 HOURS PRN
Status: DISCONTINUED | OUTPATIENT
Start: 2024-09-22 | End: 2024-09-27 | Stop reason: HOSPADM

## 2024-09-22 RX ORDER — KETOROLAC TROMETHAMINE 30 MG/ML
15 INJECTION, SOLUTION INTRAMUSCULAR; INTRAVENOUS EVERY 8 HOURS PRN
Status: DISCONTINUED | OUTPATIENT
Start: 2024-09-22 | End: 2024-09-23

## 2024-09-22 RX ORDER — KETOROLAC TROMETHAMINE 30 MG/ML
15 INJECTION, SOLUTION INTRAMUSCULAR; INTRAVENOUS ONCE
Status: COMPLETED | OUTPATIENT
Start: 2024-09-22 | End: 2024-09-22

## 2024-09-22 ASSESSMENT — ENCOUNTER SYMPTOMS
COUGH: 0
CHEST TIGHTNESS: 0
NAUSEA: 0
CHILLS: 0
POLYPHAGIA: 0
DIZZINESS: 0
FEVER: 0
SINUS PAIN: 0
SHORTNESS OF BREATH: 0
SEIZURES: 1
MYALGIAS: 0
POLYDIPSIA: 0
DIARRHEA: 0
VOMITING: 0
HEMATURIA: 0
ARTHRALGIAS: 0
COLOR CHANGE: 0
DYSURIA: 0

## 2024-09-22 ASSESSMENT — COGNITIVE AND FUNCTIONAL STATUS - GENERAL
HELP NEEDED FOR BATHING: A LITTLE
CLIMB 3 TO 5 STEPS WITH RAILING: A LITTLE
DRESSING REGULAR LOWER BODY CLOTHING: A LITTLE
MOBILITY SCORE: 23
DAILY ACTIVITIY SCORE: 22

## 2024-09-22 ASSESSMENT — COLUMBIA-SUICIDE SEVERITY RATING SCALE - C-SSRS
5. HAVE YOU STARTED TO WORK OUT OR WORKED OUT THE DETAILS OF HOW TO KILL YOURSELF? DO YOU INTEND TO CARRY OUT THIS PLAN?: NO
6. HAVE YOU EVER DONE ANYTHING, STARTED TO DO ANYTHING, OR PREPARED TO DO ANYTHING TO END YOUR LIFE?: NO
1. SINCE LAST CONTACT, HAVE YOU WISHED YOU WERE DEAD OR WISHED YOU COULD GO TO SLEEP AND NOT WAKE UP?: NO
2. HAVE YOU ACTUALLY HAD ANY THOUGHTS OF KILLING YOURSELF?: YES

## 2024-09-22 ASSESSMENT — PAIN SCALES - GENERAL
PAINLEVEL_OUTOF10: 2
PAINLEVEL_OUTOF10: 7

## 2024-09-22 ASSESSMENT — PAIN DESCRIPTION - DESCRIPTORS: DESCRIPTORS: ACHING

## 2024-09-22 ASSESSMENT — PAIN - FUNCTIONAL ASSESSMENT: PAIN_FUNCTIONAL_ASSESSMENT: 0-10
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
## 2024-09-22 NOTE — ASSESSMENT & PLAN NOTE
Hx of Bipolar/schizoaffective disorder/anxiety/depression  Psych medications resumed, hold Cymbalta due to hyponatremia  Psych consult, patient will need inpatient psych  Lacks capacity per psych to sign out AMA

## 2024-09-22 NOTE — ASSESSMENT & PLAN NOTE
Sodium 126, check urine studies  Recently resume multiple psych/antiseizure medications  Hold Cymbalta  Discussed with neurology, will continue trileptal for now due to recent seizure  Fluid restriction  Nephrology consulted, appreciate recs
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
## 2024-09-22 NOTE — CONSULTS
Consults    Reason For Consult  Hyponatremia    History Of Present Illness  Erica Machado is a 58 y.o. female with a past medical history of seizure disorder, anxiety, hypertension, bipolar, schizoaffective disorder who presented to the hospital with a seizure, headache and suicidal attempt with Tylenol.  Patient denies any other symptoms.  Says she is no longer suicidal.  We are consulted as her sodium declined to 126 today.  Notably she is on multiple agents as an outpatient that could possibly affect the sodium including desvenlafaxine, mirtazapine, meloxicam, vilazodone.  Here in the hospital she is on Trileptal and also was on Cymbalta which is being held.  She does have a history of hyponatremia but says she has never seen a nephrologist before for this.  We are consulted for management of hyponatremia.     Past Medical History  She has a past medical history of Anxiety, COPD (chronic obstructive pulmonary disease) (Multi), Diverticulosis, GERD (gastroesophageal reflux disease), HLD (hyperlipidemia), Hypertension, Hypothyroidism, Insomnia, Migraine, Osteoarthritis, Other specified anxiety disorders (01/03/2022), Schizoaffective disorder (Multi), and Seizure (Multi).    Surgical History  She has a past surgical history that includes Foot surgery (10/13/2014); Endometrial ablation; and Foot surgery.     Social History  She reports that she has been smoking cigarettes. She has never been exposed to tobacco smoke. She has never used smokeless tobacco. She reports that she does not drink alcohol and does not use drugs.    Family History  Family History   Problem Relation Name Age of Onset    Diabetes Mother      Emphysema Mother      Emphysema Father          Allergies  Codeine, Dulaglutide, Bee pollen, Ferrous sulfate, Oxycodone-acetaminophen, Penicillins, and Sulfa (sulfonamide antibiotics)    Review of Systems   Constitutional:  Negative for chills and fever.   HENT:  Negative for congestion and sinus  "pain.    Respiratory:  Negative for cough, chest tightness and shortness of breath.    Cardiovascular:  Negative for chest pain and leg swelling.   Gastrointestinal:  Negative for diarrhea, nausea and vomiting.   Endocrine: Negative for polydipsia, polyphagia and polyuria.   Genitourinary:  Negative for dysuria and hematuria.   Musculoskeletal:  Negative for arthralgias and myalgias.   Skin:  Negative for color change and rash.   Neurological:  Positive for seizures. Negative for dizziness and syncope.   Psychiatric/Behavioral:  Positive for suicidal ideas.         Depression          Physical Exam  Constitutional:       General: She is awake. She is not in acute distress.     Appearance: She is not toxic-appearing.   HENT:      Head: Normocephalic and atraumatic.      Mouth/Throat:      Mouth: Mucous membranes are moist.   Eyes:      General: No scleral icterus.     Comments: Conjunctiva clear   Neck:      Vascular: No JVD.   Cardiovascular:      Rate and Rhythm: Regular rhythm.      Heart sounds:      No friction rub.   Pulmonary:      Effort: Pulmonary effort is normal.      Breath sounds: Normal breath sounds.   Abdominal:      General: Bowel sounds are normal.      Palpations: Abdomen is soft.      Tenderness: There is no guarding or rebound.   Musculoskeletal:      Cervical back: Neck supple.      Right lower leg: No edema.      Left lower leg: No edema.   Skin:     General: Skin is warm and dry.   Neurological:      Mental Status: She is alert.      Comments: Cooperative with exam   Psychiatric:         Behavior: Behavior is not agitated.      Comments: Slightly depressed affect            Last Recorded Vitals  Blood pressure (!) 172/101, pulse 79, temperature 36.3 °C (97.3 °F), temperature source Temporal, resp. rate 18, height 1.727 m (5' 8\"), weight 92 kg (202 lb 13.2 oz), SpO2 96%.    Relevant Results  Results for orders placed or performed during the hospital encounter of 09/19/24 (from the past 24 " hour(s))   CBC   Result Value Ref Range    WBC 9.6 4.4 - 11.3 x10*3/uL    nRBC 0.0 0.0 - 0.0 /100 WBCs    RBC 3.47 (L) 4.00 - 5.20 x10*6/uL    Hemoglobin 11.1 (L) 12.0 - 16.0 g/dL    Hematocrit 33.8 (L) 36.0 - 46.0 %    MCV 97 80 - 100 fL    MCH 32.0 26.0 - 34.0 pg    MCHC 32.8 32.0 - 36.0 g/dL    RDW 14.1 11.5 - 14.5 %    Platelets 238 150 - 450 x10*3/uL   Basic Metabolic Panel   Result Value Ref Range    Glucose 95 74 - 99 mg/dL    Sodium 126 (L) 136 - 145 mmol/L    Potassium 4.9 3.5 - 5.3 mmol/L    Chloride 98 98 - 107 mmol/L    Bicarbonate 20 (L) 21 - 32 mmol/L    Anion Gap 13 10 - 20 mmol/L    Urea Nitrogen 14 6 - 23 mg/dL    Creatinine 0.72 0.50 - 1.05 mg/dL    eGFR >90 >60 mL/min/1.73m*2    Calcium 8.6 8.6 - 10.3 mg/dL   Sodium, Urine Random   Result Value Ref Range    Sodium, Urine Random 128 mmol/L    Creatinine, Urine Random 46.9 20.0 - 320.0 mg/dL    Sodium/Creatinine Ratio 273 Not established. mmol/g Creat          Assessment/Plan   Hyponatremia, this may be SIADH medication related however the workup is pending.  Possible offending agents are Trileptal and SSRI  Hypertension  Depression with suicide attempt  Bipolar disorder  Seizure disorder    Plan: Pending workup including the serum osmolality, urine sodium and urine osmolality.  Initiate fluid restriction 1.4 L daily.  Start Norvasc 5 mg daily for elevated blood pressure.  Ensure low-sodium diet.  Neurology on board.  If the workup is suggestive of SIADH and the sodium does not improve or declines despite fluid restriction and holding the SSRI then I would recommend switching off of Trileptal in that case to an alternate agent, and this will be deferred to neurology.  May also need to add urea powder if SIADH and not improving with fluid restriction.  Await pending workup.  Thank you for your consultation.    Jose Doss MD

## 2024-09-22 NOTE — NURSING NOTE
Assumed care of patient   A&ox4  Sitting upright in bed  Siderails padded   Seizure and suicide precautions   Bed low and locked  Bed alarm on   Sitter at bedside
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
## 2024-09-22 NOTE — CARE PLAN
The patient's goals for the shift include      The clinical goals for the shift include patient to get rest    Over the shift, the patient did not make progress toward the following goals. Barriers to progression include weakness. Recommendations to address these barriers include pt/ot.

## 2024-09-23 LAB
ALBUMIN SERPL BCP-MCNC: 3.7 G/DL (ref 3.4–5)
ANION GAP SERPL CALCULATED.3IONS-SCNC: 12 MMOL/L (ref 10–20)
ATRIAL RATE: 69 BPM
BUN SERPL-MCNC: 12 MG/DL (ref 6–23)
BUN SERPL-MCNC: 19 MG/DL (ref 6–23)
BUN SERPL-MCNC: 9 MG/DL (ref 6–23)
CALCIUM SERPL-MCNC: 8.3 MG/DL (ref 8.6–10.3)
CALCIUM SERPL-MCNC: 8.6 MG/DL (ref 8.6–10.3)
CALCIUM SERPL-MCNC: 9 MG/DL (ref 8.6–10.3)
CHLORIDE SERPL-SCNC: 88 MMOL/L (ref 98–107)
CHLORIDE SERPL-SCNC: 89 MMOL/L (ref 98–107)
CHLORIDE SERPL-SCNC: 91 MMOL/L (ref 98–107)
CO2 SERPL-SCNC: 22 MMOL/L (ref 21–32)
CO2 SERPL-SCNC: 23 MMOL/L (ref 21–32)
CO2 SERPL-SCNC: 24 MMOL/L (ref 21–32)
CREAT SERPL-MCNC: 0.72 MG/DL (ref 0.5–1.05)
CREAT SERPL-MCNC: 0.74 MG/DL (ref 0.5–1.05)
CREAT SERPL-MCNC: 0.77 MG/DL (ref 0.5–1.05)
EGFRCR SERPLBLD CKD-EPI 2021: >90 ML/MIN/1.73M*2
ERYTHROCYTE [DISTWIDTH] IN BLOOD BY AUTOMATED COUNT: 13.4 % (ref 11.5–14.5)
GLUCOSE SERPL-MCNC: 104 MG/DL (ref 74–99)
GLUCOSE SERPL-MCNC: 89 MG/DL (ref 74–99)
GLUCOSE SERPL-MCNC: 96 MG/DL (ref 74–99)
HCT VFR BLD AUTO: 32.3 % (ref 36–46)
HGB BLD-MCNC: 11 G/DL (ref 12–16)
LACOSAMIDE SERPL-MCNC: <0.5 UG/ML (ref 1–10)
MCH RBC QN AUTO: 32.2 PG (ref 26–34)
MCHC RBC AUTO-ENTMCNC: 34.1 G/DL (ref 32–36)
MCV RBC AUTO: 94 FL (ref 80–100)
NRBC BLD-RTO: 0 /100 WBCS (ref 0–0)
OSMOLALITY UR: 502 MOSM/KG (ref 200–1200)
P AXIS: 59 DEGREES
P OFFSET: 189 MS
P ONSET: 130 MS
PHOSPHATE SERPL-MCNC: 4.4 MG/DL (ref 2.5–4.9)
PLATELET # BLD AUTO: 224 X10*3/UL (ref 150–450)
POTASSIUM SERPL-SCNC: 3.9 MMOL/L (ref 3.5–5.3)
POTASSIUM SERPL-SCNC: 4.1 MMOL/L (ref 3.5–5.3)
POTASSIUM SERPL-SCNC: 4.8 MMOL/L (ref 3.5–5.3)
PR INTERVAL: 188 MS
Q ONSET: 224 MS
QRS COUNT: 12 BEATS
QRS DURATION: 88 MS
QT INTERVAL: 388 MS
QTC CALCULATION(BAZETT): 415 MS
QTC FREDERICIA: 406 MS
R AXIS: 18 DEGREES
RBC # BLD AUTO: 3.42 X10*6/UL (ref 4–5.2)
SODIUM SERPL-SCNC: 119 MMOL/L (ref 136–145)
SODIUM SERPL-SCNC: 120 MMOL/L (ref 136–145)
SODIUM SERPL-SCNC: 121 MMOL/L (ref 136–145)
T AXIS: 41 DEGREES
T OFFSET: 418 MS
VENTRICULAR RATE: 69 BPM
WBC # BLD AUTO: 8 X10*3/UL (ref 4.4–11.3)

## 2024-09-23 PROCEDURE — 36415 COLL VENOUS BLD VENIPUNCTURE: CPT | Performed by: INTERNAL MEDICINE

## 2024-09-23 PROCEDURE — 2500000001 HC RX 250 WO HCPCS SELF ADMINISTERED DRUGS (ALT 637 FOR MEDICARE OP)

## 2024-09-23 PROCEDURE — 85027 COMPLETE CBC AUTOMATED: CPT | Performed by: NURSE PRACTITIONER

## 2024-09-23 PROCEDURE — 2500000004 HC RX 250 GENERAL PHARMACY W/ HCPCS (ALT 636 FOR OP/ED): Performed by: STUDENT IN AN ORGANIZED HEALTH CARE EDUCATION/TRAINING PROGRAM

## 2024-09-23 PROCEDURE — 99232 SBSQ HOSP IP/OBS MODERATE 35: CPT

## 2024-09-23 PROCEDURE — 2500000002 HC RX 250 W HCPCS SELF ADMINISTERED DRUGS (ALT 637 FOR MEDICARE OP, ALT 636 FOR OP/ED): Performed by: STUDENT IN AN ORGANIZED HEALTH CARE EDUCATION/TRAINING PROGRAM

## 2024-09-23 PROCEDURE — 99232 SBSQ HOSP IP/OBS MODERATE 35: CPT | Performed by: STUDENT IN AN ORGANIZED HEALTH CARE EDUCATION/TRAINING PROGRAM

## 2024-09-23 PROCEDURE — 2500000001 HC RX 250 WO HCPCS SELF ADMINISTERED DRUGS (ALT 637 FOR MEDICARE OP): Performed by: NURSE PRACTITIONER

## 2024-09-23 PROCEDURE — 80048 BASIC METABOLIC PNL TOTAL CA: CPT | Mod: CCI | Performed by: INTERNAL MEDICINE

## 2024-09-23 PROCEDURE — 2500000001 HC RX 250 WO HCPCS SELF ADMINISTERED DRUGS (ALT 637 FOR MEDICARE OP): Performed by: STUDENT IN AN ORGANIZED HEALTH CARE EDUCATION/TRAINING PROGRAM

## 2024-09-23 PROCEDURE — 2060000001 HC INTERMEDIATE ICU ROOM DAILY

## 2024-09-23 PROCEDURE — 84100 ASSAY OF PHOSPHORUS: CPT | Performed by: INTERNAL MEDICINE

## 2024-09-23 PROCEDURE — 2500000004 HC RX 250 GENERAL PHARMACY W/ HCPCS (ALT 636 FOR OP/ED): Performed by: HOSPITALIST

## 2024-09-23 PROCEDURE — 2500000001 HC RX 250 WO HCPCS SELF ADMINISTERED DRUGS (ALT 637 FOR MEDICARE OP): Performed by: INTERNAL MEDICINE

## 2024-09-23 PROCEDURE — 2500000004 HC RX 250 GENERAL PHARMACY W/ HCPCS (ALT 636 FOR OP/ED): Performed by: NURSE PRACTITIONER

## 2024-09-23 PROCEDURE — 2500000002 HC RX 250 W HCPCS SELF ADMINISTERED DRUGS (ALT 637 FOR MEDICARE OP, ALT 636 FOR OP/ED): Performed by: REGISTERED NURSE

## 2024-09-23 RX ORDER — MAGNESIUM SULFATE 1 G/100ML
1 INJECTION INTRAVENOUS ONCE
Status: COMPLETED | OUTPATIENT
Start: 2024-09-23 | End: 2024-09-23

## 2024-09-23 RX ORDER — LACOSAMIDE 50 MG/1
150 TABLET ORAL ONCE
Status: DISCONTINUED | OUTPATIENT
Start: 2024-09-23 | End: 2024-09-24

## 2024-09-23 RX ORDER — DEXAMETHASONE SODIUM PHOSPHATE 10 MG/ML
4 INJECTION INTRAMUSCULAR; INTRAVENOUS ONCE
Status: DISCONTINUED | OUTPATIENT
Start: 2024-09-23 | End: 2024-09-23

## 2024-09-23 RX ORDER — LACOSAMIDE 100 MG/1
200 TABLET ORAL 2 TIMES DAILY
Status: DISCONTINUED | OUTPATIENT
Start: 2024-09-24 | End: 2024-09-24

## 2024-09-23 RX ORDER — OXCARBAZEPINE 150 MG/1
150 TABLET, FILM COATED ORAL 2 TIMES DAILY
Status: COMPLETED | OUTPATIENT
Start: 2024-09-23 | End: 2024-09-23

## 2024-09-23 RX ORDER — OXCARBAZEPINE 150 MG/1
150 TABLET, FILM COATED ORAL 2 TIMES DAILY
Status: DISCONTINUED | OUTPATIENT
Start: 2024-09-23 | End: 2024-09-23

## 2024-09-23 RX ORDER — LAMOTRIGINE 25 MG/1
25 TABLET ORAL DAILY
Status: DISCONTINUED | OUTPATIENT
Start: 2024-09-23 | End: 2024-09-27 | Stop reason: HOSPADM

## 2024-09-23 ASSESSMENT — PAIN - FUNCTIONAL ASSESSMENT
PAIN_FUNCTIONAL_ASSESSMENT: 0-10

## 2024-09-23 ASSESSMENT — ENCOUNTER SYMPTOMS
MYALGIAS: 1
WEAKNESS: 1
CONFUSION: 0
ARTHRALGIAS: 1
DYSPHORIC MOOD: 1
AGITATION: 0
FATIGUE: 1
HALLUCINATIONS: 1
SLEEP DISTURBANCE: 1
HYPERACTIVE: 0
ACTIVITY CHANGE: 1
DECREASED CONCENTRATION: 1
NERVOUS/ANXIOUS: 1

## 2024-09-23 ASSESSMENT — PAIN DESCRIPTION - ORIENTATION: ORIENTATION: MID

## 2024-09-23 ASSESSMENT — COGNITIVE AND FUNCTIONAL STATUS - GENERAL
DAILY ACTIVITIY SCORE: 24
MOBILITY SCORE: 23
CLIMB 3 TO 5 STEPS WITH RAILING: A LITTLE

## 2024-09-23 ASSESSMENT — PAIN SCALES - GENERAL
PAINLEVEL_OUTOF10: 5 - MODERATE PAIN
PAINLEVEL_OUTOF10: 8
PAINLEVEL_OUTOF10: 0 - NO PAIN
PAINLEVEL_OUTOF10: 7

## 2024-09-23 ASSESSMENT — PAIN DESCRIPTION - DESCRIPTORS
DESCRIPTORS: ACHING
DESCRIPTORS: ACHING

## 2024-09-23 ASSESSMENT — PAIN DESCRIPTION - LOCATION: LOCATION: HEAD

## 2024-09-23 NOTE — ASSESSMENT & PLAN NOTE
Does report a head injury four days ago  CT head showed no acute process  Continue anti seizure medications per neurology recs  Seizure precautions  Monitor on tele  EEG showed no seizure activity
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
## 2024-09-23 NOTE — PROGRESS NOTES
Erica Machado is a 58 y.o. female on day 4 of admission presenting with Seizure (Multi).      Subjective   No complaints however her sodium is down to 121. Says she is following the fluid restriction.        Objective          Vitals 24HR  Heart Rate:  [65-73]   Temp:  [35.9 °C (96.6 °F)-37 °C (98.6 °F)]   Resp:  [16-18]   BP: (124-169)/(81-96)   Weight:  [93.7 kg (206 lb 9.6 oz)]   SpO2:  [92 %-100 %]       Intake/Output last 3 Shifts:    Intake/Output Summary (Last 24 hours) at 9/23/2024 1351  Last data filed at 9/23/2024 1144  Gross per 24 hour   Intake 690 ml   Output --   Net 690 ml       Physical Exam  Constitutional:       General: She is awake. She is not in acute distress.  Cardiovascular:      Rate and Rhythm: Regular rhythm.      Heart sounds:      No friction rub.   Pulmonary:      Effort: Pulmonary effort is normal.      Breath sounds: Normal breath sounds.   Abdominal:      General: Bowel sounds are normal.      Palpations: Abdomen is soft.      Tenderness: There is no guarding or rebound.   Musculoskeletal:      Right lower leg: No edema.      Left lower leg: No edema.   Neurological:      Mental Status: She is alert.         Relevant Results  Results for orders placed or performed during the hospital encounter of 09/19/24 (from the past 24 hour(s))   Renal Function Panel   Result Value Ref Range    Glucose 96 74 - 99 mg/dL    Sodium 121 (L) 136 - 145 mmol/L    Potassium 4.1 3.5 - 5.3 mmol/L    Chloride 91 (L) 98 - 107 mmol/L    Bicarbonate 22 21 - 32 mmol/L    Anion Gap 12 10 - 20 mmol/L    Urea Nitrogen 12 6 - 23 mg/dL    Creatinine 0.72 0.50 - 1.05 mg/dL    eGFR >90 >60 mL/min/1.73m*2    Calcium 8.3 (L) 8.6 - 10.3 mg/dL    Phosphorus 4.4 2.5 - 4.9 mg/dL    Albumin 3.7 3.4 - 5.0 g/dL   CBC   Result Value Ref Range    WBC 8.0 4.4 - 11.3 x10*3/uL    nRBC 0.0 0.0 - 0.0 /100 WBCs    RBC 3.42 (L) 4.00 - 5.20 x10*6/uL    Hemoglobin 11.0 (L) 12.0 - 16.0 g/dL    Hematocrit 32.3 (L) 36.0 - 46.0 %     MCV 94 80 - 100 fL    MCH 32.2 26.0 - 34.0 pg    MCHC 34.1 32.0 - 36.0 g/dL    RDW 13.4 11.5 - 14.5 %    Platelets 224 150 - 450 x10*3/uL   Basic metabolic panel   Result Value Ref Range    Glucose 89 74 - 99 mg/dL    Sodium 119 (LL) 136 - 145 mmol/L    Potassium 4.8 3.5 - 5.3 mmol/L    Chloride 88 (L) 98 - 107 mmol/L    Bicarbonate 24 21 - 32 mmol/L    Anion Gap 12 10 - 20 mmol/L    Urea Nitrogen 9 6 - 23 mg/dL    Creatinine 0.77 0.50 - 1.05 mg/dL    eGFR >90 >60 mL/min/1.73m*2    Calcium 8.6 8.6 - 10.3 mg/dL            Assessment/Plan   Hyponatremia secondary to SIADH most likely from Trileptal, worsening  Hypertension  Depression with suicide attempt  Bipolar disorder  Seizure disorder     Plan: Continue fluid restriction adjusted to 1.2 L daily.  Add urea powder 15 g daily. Noted Neurology is weaning down and off of Trileptal, agree with transitioning off of Trileptal as this appears to be the etoiology. Avoid NSAIDs, discussed with primary team. Avoid IV fluids especially fluid bolus, discussed with Neurology, as patient was getting bolus per Neurology which I discontinued when I saw the order. Monitor sodium levels closely. Continue Norvasc 5 mg daily for elevated blood pressure.  Ensure low-sodium diet.  Neurology on board.     Jose Doss MD
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
## 2024-09-23 NOTE — ASSESSMENT & PLAN NOTE
Sodium 121,   Recently resume multiple psych/antiseizure medications  Hold Cymbalta  Neurology titrating down Trileptal  Fluid restriction  Nephrology following

## 2024-09-23 NOTE — NURSING NOTE
"Assumed care of  patient  A&Ox4  Up in chair  \"Chair is way more comfortable than the bed\"  Suicide sitter at bedside  "

## 2024-09-23 NOTE — PROGRESS NOTES
09/23/24 0809   Discharge Planning   Expected Discharge Disposition Psych     Patient to go to inpatient psych once medically clear    Your test for COVID-19, also known as novel coronavirus, came back negative. No virus was detected from the sample collected. Testing is not 100%. Until your symptoms are fully resolved, you may still be contagious. We recommend that you remain isolated for 7 days minimum or 72 hours after your symptoms have completely resolved, whichever is longer. If you were exposed to a known positive COVID-19 patient, then you must remain isolated for 14 days. If you were tested for a pre-op, then you remain in isolated until your procedure. Continually monitor symptoms. Contact a medical provider if symptoms are worsening. If you have any additional questions, contact your PCP.     For additional information, please visit the Centers for Disease Control and Prevention [email or web address]
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
## 2024-09-24 LAB
ANION GAP SERPL CALCULATED.3IONS-SCNC: 12 MMOL/L (ref 10–20)
BUN SERPL-MCNC: 13 MG/DL (ref 6–23)
BUN SERPL-MCNC: 15 MG/DL (ref 6–23)
CALCIUM SERPL-MCNC: 9 MG/DL (ref 8.6–10.3)
CALCIUM SERPL-MCNC: 9.3 MG/DL (ref 8.6–10.3)
CALCIUM SERPL-MCNC: 9.4 MG/DL (ref 8.6–10.3)
CALCIUM SERPL-MCNC: 9.8 MG/DL (ref 8.6–10.3)
CHLORIDE SERPL-SCNC: 100 MMOL/L (ref 98–107)
CHLORIDE SERPL-SCNC: 98 MMOL/L (ref 98–107)
CHLORIDE SERPL-SCNC: 98 MMOL/L (ref 98–107)
CHLORIDE SERPL-SCNC: 99 MMOL/L (ref 98–107)
CO2 SERPL-SCNC: 22 MMOL/L (ref 21–32)
CO2 SERPL-SCNC: 23 MMOL/L (ref 21–32)
CO2 SERPL-SCNC: 23 MMOL/L (ref 21–32)
CO2 SERPL-SCNC: 24 MMOL/L (ref 21–32)
CREAT SERPL-MCNC: 0.62 MG/DL (ref 0.5–1.05)
CREAT SERPL-MCNC: 0.65 MG/DL (ref 0.5–1.05)
CREAT SERPL-MCNC: 0.67 MG/DL (ref 0.5–1.05)
CREAT SERPL-MCNC: 0.78 MG/DL (ref 0.5–1.05)
EGFRCR SERPLBLD CKD-EPI 2021: 89 ML/MIN/1.73M*2
EGFRCR SERPLBLD CKD-EPI 2021: >90 ML/MIN/1.73M*2
GLUCOSE SERPL-MCNC: 100 MG/DL (ref 74–99)
GLUCOSE SERPL-MCNC: 116 MG/DL (ref 74–99)
GLUCOSE SERPL-MCNC: 160 MG/DL (ref 74–99)
GLUCOSE SERPL-MCNC: 95 MG/DL (ref 74–99)
HOLD SPECIMEN: NORMAL
POTASSIUM SERPL-SCNC: 4.1 MMOL/L (ref 3.5–5.3)
POTASSIUM SERPL-SCNC: 4.2 MMOL/L (ref 3.5–5.3)
POTASSIUM SERPL-SCNC: 4.4 MMOL/L (ref 3.5–5.3)
POTASSIUM SERPL-SCNC: 4.5 MMOL/L (ref 3.5–5.3)
SODIUM SERPL-SCNC: 128 MMOL/L (ref 136–145)
SODIUM SERPL-SCNC: 129 MMOL/L (ref 136–145)
SODIUM SERPL-SCNC: 130 MMOL/L (ref 136–145)
SODIUM SERPL-SCNC: 131 MMOL/L (ref 136–145)

## 2024-09-24 PROCEDURE — 80048 BASIC METABOLIC PNL TOTAL CA: CPT | Performed by: INTERNAL MEDICINE

## 2024-09-24 PROCEDURE — 2500000001 HC RX 250 WO HCPCS SELF ADMINISTERED DRUGS (ALT 637 FOR MEDICARE OP): Performed by: STUDENT IN AN ORGANIZED HEALTH CARE EDUCATION/TRAINING PROGRAM

## 2024-09-24 PROCEDURE — 82374 ASSAY BLOOD CARBON DIOXIDE: CPT | Performed by: INTERNAL MEDICINE

## 2024-09-24 PROCEDURE — 36415 COLL VENOUS BLD VENIPUNCTURE: CPT | Performed by: INTERNAL MEDICINE

## 2024-09-24 PROCEDURE — 2500000001 HC RX 250 WO HCPCS SELF ADMINISTERED DRUGS (ALT 637 FOR MEDICARE OP)

## 2024-09-24 PROCEDURE — 2060000001 HC INTERMEDIATE ICU ROOM DAILY

## 2024-09-24 PROCEDURE — 2500000001 HC RX 250 WO HCPCS SELF ADMINISTERED DRUGS (ALT 637 FOR MEDICARE OP): Performed by: NURSE PRACTITIONER

## 2024-09-24 PROCEDURE — 2500000001 HC RX 250 WO HCPCS SELF ADMINISTERED DRUGS (ALT 637 FOR MEDICARE OP): Performed by: INTERNAL MEDICINE

## 2024-09-24 PROCEDURE — 99232 SBSQ HOSP IP/OBS MODERATE 35: CPT | Performed by: STUDENT IN AN ORGANIZED HEALTH CARE EDUCATION/TRAINING PROGRAM

## 2024-09-24 ASSESSMENT — COGNITIVE AND FUNCTIONAL STATUS - GENERAL
DAILY ACTIVITIY SCORE: 24
CLIMB 3 TO 5 STEPS WITH RAILING: A LITTLE
MOBILITY SCORE: 23

## 2024-09-24 ASSESSMENT — PAIN SCALES - GENERAL
PAINLEVEL_OUTOF10: 3
PAINLEVEL_OUTOF10: 0 - NO PAIN

## 2024-09-24 ASSESSMENT — PAIN - FUNCTIONAL ASSESSMENT
PAIN_FUNCTIONAL_ASSESSMENT: 0-10
PAIN_FUNCTIONAL_ASSESSMENT: 0-10

## 2024-09-24 ASSESSMENT — PAIN DESCRIPTION - DESCRIPTORS: DESCRIPTORS: ACHING;DULL;DISCOMFORT

## 2024-09-24 NOTE — ASSESSMENT & PLAN NOTE
Hx of Bipolar/schizoaffective disorder/anxiety/depression  Psych medications resumed, hold Cymbalta due to hyponatremia  Psych consult, patient will need inpatient psych
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
## 2024-09-24 NOTE — CARE PLAN
Problem: Risk for Suicide  Goal: Makes needs known through verbalization or behaviors this shift  Outcome: Progressing     Problem: Risk for Suicide  Goal: No self harm this shift  Outcome: Progressing   The patient's goals for the shift include get ready for discharge     The clinical goals for the shift include safety via constant observation

## 2024-09-24 NOTE — ASSESSMENT & PLAN NOTE
Suicide Attempt  Suicide precautions  Sitter at bedside  Consult psych, patient meets inpatient psych criteria

## 2024-09-24 NOTE — NURSING NOTE
Assumed care of patient  A&Ox3  Patient sitting up in bed  Bed low and locked   Call light and belongings within reach  Bed alarm on  Siderails up x3  Seizure and suicide precautions

## 2024-09-24 NOTE — ASSESSMENT & PLAN NOTE
Patient took an unknown amount of tylenol at an attempt at suicide  Acetadote three bags completed  LFTs normal

## 2024-09-24 NOTE — PROGRESS NOTES
Erica Machado is a 58 y.o. female on day 5 of admission presenting with Seizure (Multi).      Subjective   The patient has no complaints, her sodium is up to 129 which is at an appropriate level.       Objective          Vitals 24HR  Heart Rate:  [70-79]   Temp:  [35.9 °C (96.6 °F)-36.4 °C (97.5 °F)]   Resp:  [16-19]   BP: (116-159)/(63-96)   Weight:  [94.3 kg (207 lb 12.8 oz)]   SpO2:  [95 %-100 %]       Intake/Output last 3 Shifts:    Intake/Output Summary (Last 24 hours) at 9/24/2024 1120  Last data filed at 9/24/2024 0900  Gross per 24 hour   Intake 975 ml   Output --   Net 975 ml       Physical Exam  Constitutional:       General: She is awake. She is not in acute distress.  Cardiovascular:      Rate and Rhythm: Regular rhythm.      Heart sounds:      No friction rub.   Pulmonary:      Effort: Pulmonary effort is normal.      Breath sounds: Normal breath sounds.   Abdominal:      General: Bowel sounds are normal.      Palpations: Abdomen is soft.      Tenderness: There is no guarding or rebound.   Musculoskeletal:      Right lower leg: No edema.      Left lower leg: No edema.     Relevant Results  Results for orders placed or performed during the hospital encounter of 09/19/24 (from the past 24 hour(s))   Basic metabolic panel   Result Value Ref Range    Glucose 89 74 - 99 mg/dL    Sodium 119 (LL) 136 - 145 mmol/L    Potassium 4.8 3.5 - 5.3 mmol/L    Chloride 88 (L) 98 - 107 mmol/L    Bicarbonate 24 21 - 32 mmol/L    Anion Gap 12 10 - 20 mmol/L    Urea Nitrogen 9 6 - 23 mg/dL    Creatinine 0.77 0.50 - 1.05 mg/dL    eGFR >90 >60 mL/min/1.73m*2    Calcium 8.6 8.6 - 10.3 mg/dL   Basic metabolic panel   Result Value Ref Range    Glucose 104 (H) 74 - 99 mg/dL    Sodium 120 (LL) 136 - 145 mmol/L    Potassium 3.9 3.5 - 5.3 mmol/L    Chloride 89 (L) 98 - 107 mmol/L    Bicarbonate 23 21 - 32 mmol/L    Anion Gap 12 10 - 20 mmol/L    Urea Nitrogen 19 6 - 23 mg/dL    Creatinine 0.74 0.50 - 1.05 mg/dL    eGFR >90  >60 mL/min/1.73m*2    Calcium 9.0 8.6 - 10.3 mg/dL   Lavender Top   Result Value Ref Range    Extra Tube Hold for add-ons.    Basic metabolic panel   Result Value Ref Range    Glucose 95 74 - 99 mg/dL    Sodium 128 (L) 136 - 145 mmol/L    Potassium 4.2 3.5 - 5.3 mmol/L    Chloride 98 98 - 107 mmol/L    Bicarbonate 22 21 - 32 mmol/L    Anion Gap 12 10 - 20 mmol/L    Urea Nitrogen 15 6 - 23 mg/dL    Creatinine 0.65 0.50 - 1.05 mg/dL    eGFR >90 >60 mL/min/1.73m*2    Calcium 9.0 8.6 - 10.3 mg/dL   Basic metabolic panel   Result Value Ref Range    Glucose 100 (H) 74 - 99 mg/dL    Sodium 130 (L) 136 - 145 mmol/L    Potassium 4.4 3.5 - 5.3 mmol/L    Chloride 99 98 - 107 mmol/L    Bicarbonate 23 21 - 32 mmol/L    Anion Gap 12 10 - 20 mmol/L    Urea Nitrogen 13 6 - 23 mg/dL    Creatinine 0.62 0.50 - 1.05 mg/dL    eGFR >90 >60 mL/min/1.73m*2    Calcium 9.8 8.6 - 10.3 mg/dL   Basic metabolic panel   Result Value Ref Range    Glucose 160 (H) 74 - 99 mg/dL    Sodium 129 (L) 136 - 145 mmol/L    Potassium 4.1 3.5 - 5.3 mmol/L    Chloride 98 98 - 107 mmol/L    Bicarbonate 23 21 - 32 mmol/L    Anion Gap 12 10 - 20 mmol/L    Urea Nitrogen 13 6 - 23 mg/dL    Creatinine 0.67 0.50 - 1.05 mg/dL    eGFR >90 >60 mL/min/1.73m*2    Calcium 9.3 8.6 - 10.3 mg/dL            Assessment/Plan   Hyponatremia secondary to SIADH most likely from Trileptal, improved  Hypertension  Depression with suicide attempt  Bipolar disorder  Seizure disorder     Plan: Continue fluid restriction 1.2 L daily however will hold the urea powder, avoid correcting sodium by more than 6 to 8 mmol/L in 24 hours.  Check an updated basic metabolic panel this afternoon. Neurology is weaning off of Trileptal, agree with transitioning off of Trileptal as this appears to be the etoiology. Avoid NSAIDs, discussed with primary team. Avoid IV fluids especially fluid bolus, discussed with neurology yesterday. Continue Norvasc 5 mg daily for elevated blood pressure.  Ensure  low-sodium diet.  After discharge will need a basic metabolic panel in 3 days and follow-up with us in the office in 2 weeks.    Jose Doss MD

## 2024-09-24 NOTE — ASSESSMENT & PLAN NOTE
Improving  Most recent value 129  Recently resume multiple psych/antiseizure medications  Hold Cymbalta  Neurology titrating down Trileptal  Fluid restriction  Nephrology following   pt came to the ER today with c/o chest pain pt denies SOB. pt states pain happened at night. pt also here for diabetic foot ulcer.
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
## 2024-09-24 NOTE — ASSESSMENT & PLAN NOTE
Does report a head injury prior to admission  CT head showed no acute process  Continue anti seizure medications per neurology recs  Seizure precautions  Monitor on tele  EEG showed no seizure activity--repeat routine EEG ordered for tomorrow

## 2024-09-25 ENCOUNTER — APPOINTMENT (OUTPATIENT)
Dept: NEUROLOGY | Facility: HOSPITAL | Age: 59
End: 2024-09-25
Payer: MEDICAID

## 2024-09-25 LAB
ANION GAP SERPL CALCULATED.3IONS-SCNC: 12 MMOL/L (ref 10–20)
BASOPHILS # BLD AUTO: 0.04 X10*3/UL (ref 0–0.1)
BASOPHILS NFR BLD AUTO: 0.7 %
BUN SERPL-MCNC: 12 MG/DL (ref 6–23)
CALCIUM SERPL-MCNC: 10.2 MG/DL (ref 8.6–10.3)
CHLORIDE SERPL-SCNC: 100 MMOL/L (ref 98–107)
CO2 SERPL-SCNC: 26 MMOL/L (ref 21–32)
CREAT SERPL-MCNC: 0.77 MG/DL (ref 0.5–1.05)
EGFRCR SERPLBLD CKD-EPI 2021: >90 ML/MIN/1.73M*2
EOSINOPHIL # BLD AUTO: 0.25 X10*3/UL (ref 0–0.7)
EOSINOPHIL NFR BLD AUTO: 4.1 %
ERYTHROCYTE [DISTWIDTH] IN BLOOD BY AUTOMATED COUNT: 14 % (ref 11.5–14.5)
GLUCOSE SERPL-MCNC: 98 MG/DL (ref 74–99)
HCT VFR BLD AUTO: 41.4 % (ref 36–46)
HGB BLD-MCNC: 13.7 G/DL (ref 12–16)
IMM GRANULOCYTES # BLD AUTO: 0.03 X10*3/UL (ref 0–0.7)
IMM GRANULOCYTES NFR BLD AUTO: 0.5 % (ref 0–0.9)
LYMPHOCYTES # BLD AUTO: 1.11 X10*3/UL (ref 1.2–4.8)
LYMPHOCYTES NFR BLD AUTO: 18.3 %
MCH RBC QN AUTO: 32.5 PG (ref 26–34)
MCHC RBC AUTO-ENTMCNC: 33.1 G/DL (ref 32–36)
MCV RBC AUTO: 98 FL (ref 80–100)
MONOCYTES # BLD AUTO: 0.46 X10*3/UL (ref 0.1–1)
MONOCYTES NFR BLD AUTO: 7.6 %
NEUTROPHILS # BLD AUTO: 4.16 X10*3/UL (ref 1.2–7.7)
NEUTROPHILS NFR BLD AUTO: 68.8 %
NRBC BLD-RTO: 0 /100 WBCS (ref 0–0)
PLATELET # BLD AUTO: 302 X10*3/UL (ref 150–450)
POTASSIUM SERPL-SCNC: 4.6 MMOL/L (ref 3.5–5.3)
RBC # BLD AUTO: 4.22 X10*6/UL (ref 4–5.2)
SODIUM SERPL-SCNC: 133 MMOL/L (ref 136–145)
WBC # BLD AUTO: 6.1 X10*3/UL (ref 4.4–11.3)

## 2024-09-25 PROCEDURE — 95819 EEG AWAKE AND ASLEEP: CPT

## 2024-09-25 PROCEDURE — 2500000001 HC RX 250 WO HCPCS SELF ADMINISTERED DRUGS (ALT 637 FOR MEDICARE OP): Performed by: STUDENT IN AN ORGANIZED HEALTH CARE EDUCATION/TRAINING PROGRAM

## 2024-09-25 PROCEDURE — 2500000001 HC RX 250 WO HCPCS SELF ADMINISTERED DRUGS (ALT 637 FOR MEDICARE OP): Performed by: NURSE PRACTITIONER

## 2024-09-25 PROCEDURE — 2060000001 HC INTERMEDIATE ICU ROOM DAILY

## 2024-09-25 PROCEDURE — 99232 SBSQ HOSP IP/OBS MODERATE 35: CPT | Performed by: STUDENT IN AN ORGANIZED HEALTH CARE EDUCATION/TRAINING PROGRAM

## 2024-09-25 PROCEDURE — 85025 COMPLETE CBC W/AUTO DIFF WBC: CPT

## 2024-09-25 PROCEDURE — 2500000004 HC RX 250 GENERAL PHARMACY W/ HCPCS (ALT 636 FOR OP/ED): Performed by: STUDENT IN AN ORGANIZED HEALTH CARE EDUCATION/TRAINING PROGRAM

## 2024-09-25 PROCEDURE — 36415 COLL VENOUS BLD VENIPUNCTURE: CPT

## 2024-09-25 PROCEDURE — 2500000001 HC RX 250 WO HCPCS SELF ADMINISTERED DRUGS (ALT 637 FOR MEDICARE OP): Performed by: INTERNAL MEDICINE

## 2024-09-25 PROCEDURE — 2500000004 HC RX 250 GENERAL PHARMACY W/ HCPCS (ALT 636 FOR OP/ED): Performed by: NURSE PRACTITIONER

## 2024-09-25 PROCEDURE — 2500000001 HC RX 250 WO HCPCS SELF ADMINISTERED DRUGS (ALT 637 FOR MEDICARE OP)

## 2024-09-25 PROCEDURE — 99233 SBSQ HOSP IP/OBS HIGH 50: CPT

## 2024-09-25 PROCEDURE — 80048 BASIC METABOLIC PNL TOTAL CA: CPT

## 2024-09-25 RX ORDER — MAGNESIUM SULFATE 1 G/100ML
1 INJECTION INTRAVENOUS ONCE
Status: COMPLETED | OUTPATIENT
Start: 2024-09-25 | End: 2024-09-25

## 2024-09-25 RX ORDER — KETOROLAC TROMETHAMINE 30 MG/ML
30 INJECTION, SOLUTION INTRAMUSCULAR; INTRAVENOUS ONCE
Status: COMPLETED | OUTPATIENT
Start: 2024-09-25 | End: 2024-09-25

## 2024-09-25 SDOH — ECONOMIC STABILITY: INCOME INSECURITY: IN THE PAST 12 MONTHS, HAS THE ELECTRIC, GAS, OIL, OR WATER COMPANY THREATENED TO SHUT OFF SERVICE IN YOUR HOME?: NO

## 2024-09-25 SDOH — SOCIAL STABILITY: SOCIAL INSECURITY: WITHIN THE LAST YEAR, HAVE YOU BEEN AFRAID OF YOUR PARTNER OR EX-PARTNER?: NO

## 2024-09-25 SDOH — ECONOMIC STABILITY: HOUSING INSECURITY: AT ANY TIME IN THE PAST 12 MONTHS, WERE YOU HOMELESS OR LIVING IN A SHELTER (INCLUDING NOW)?: NO

## 2024-09-25 SDOH — SOCIAL STABILITY: SOCIAL INSECURITY
WITHIN THE LAST YEAR, HAVE YOU BEEN KICKED, HIT, SLAPPED, OR OTHERWISE PHYSICALLY HURT BY YOUR PARTNER OR EX-PARTNER?: NO

## 2024-09-25 SDOH — ECONOMIC STABILITY: INCOME INSECURITY: HOW HARD IS IT FOR YOU TO PAY FOR THE VERY BASICS LIKE FOOD, HOUSING, MEDICAL CARE, AND HEATING?: SOMEWHAT HARD

## 2024-09-25 SDOH — ECONOMIC STABILITY: TRANSPORTATION INSECURITY
IN THE PAST 12 MONTHS, HAS LACK OF TRANSPORTATION KEPT YOU FROM MEETINGS, WORK, OR FROM GETTING THINGS NEEDED FOR DAILY LIVING?: YES

## 2024-09-25 SDOH — ECONOMIC STABILITY: INCOME INSECURITY: IN THE LAST 12 MONTHS, WAS THERE A TIME WHEN YOU WERE NOT ABLE TO PAY THE MORTGAGE OR RENT ON TIME?: YES

## 2024-09-25 SDOH — SOCIAL STABILITY: SOCIAL INSECURITY
WITHIN THE LAST YEAR, HAVE TO BEEN RAPED OR FORCED TO HAVE ANY KIND OF SEXUAL ACTIVITY BY YOUR PARTNER OR EX-PARTNER?: NO

## 2024-09-25 SDOH — ECONOMIC STABILITY: FOOD INSECURITY: WITHIN THE PAST 12 MONTHS, THE FOOD YOU BOUGHT JUST DIDN'T LAST AND YOU DIDN'T HAVE MONEY TO GET MORE.: NEVER TRUE

## 2024-09-25 SDOH — ECONOMIC STABILITY: TRANSPORTATION INSECURITY
IN THE PAST 12 MONTHS, HAS THE LACK OF TRANSPORTATION KEPT YOU FROM MEDICAL APPOINTMENTS OR FROM GETTING MEDICATIONS?: YES

## 2024-09-25 SDOH — SOCIAL STABILITY: SOCIAL INSECURITY: WITHIN THE LAST YEAR, HAVE YOU BEEN HUMILIATED OR EMOTIONALLY ABUSED IN OTHER WAYS BY YOUR PARTNER OR EX-PARTNER?: NO

## 2024-09-25 SDOH — ECONOMIC STABILITY: FOOD INSECURITY: WITHIN THE PAST 12 MONTHS, YOU WORRIED THAT YOUR FOOD WOULD RUN OUT BEFORE YOU GOT MONEY TO BUY MORE.: NEVER TRUE

## 2024-09-25 SDOH — ECONOMIC STABILITY: HOUSING INSECURITY: IN THE PAST 12 MONTHS, HOW MANY TIMES HAVE YOU MOVED WHERE YOU WERE LIVING?: 0

## 2024-09-25 ASSESSMENT — PAIN SCALES - WONG BAKER: WONGBAKER_NUMERICALRESPONSE: NO HURT

## 2024-09-25 ASSESSMENT — COGNITIVE AND FUNCTIONAL STATUS - GENERAL
CLIMB 3 TO 5 STEPS WITH RAILING: A LITTLE
MOBILITY SCORE: 23
DAILY ACTIVITIY SCORE: 24

## 2024-09-25 ASSESSMENT — ENCOUNTER SYMPTOMS
HALLUCINATIONS: 1
FATIGUE: 1
MYALGIAS: 1
HYPERACTIVE: 0
SLEEP DISTURBANCE: 1
ACTIVITY CHANGE: 1
DYSPHORIC MOOD: 1
CONFUSION: 0
DECREASED CONCENTRATION: 1
ARTHRALGIAS: 1
WEAKNESS: 1
AGITATION: 0
NERVOUS/ANXIOUS: 1

## 2024-09-25 ASSESSMENT — COLUMBIA-SUICIDE SEVERITY RATING SCALE - C-SSRS
5. HAVE YOU STARTED TO WORK OUT OR WORKED OUT THE DETAILS OF HOW TO KILL YOURSELF? DO YOU INTEND TO CARRY OUT THIS PLAN?: NO
1. SINCE LAST CONTACT, HAVE YOU WISHED YOU WERE DEAD OR WISHED YOU COULD GO TO SLEEP AND NOT WAKE UP?: NO
6. HAVE YOU EVER DONE ANYTHING, STARTED TO DO ANYTHING, OR PREPARED TO DO ANYTHING TO END YOUR LIFE?: NO
2. HAVE YOU ACTUALLY HAD ANY THOUGHTS OF KILLING YOURSELF?: NO

## 2024-09-25 ASSESSMENT — PAIN SCALES - GENERAL
PAINLEVEL_OUTOF10: 0 - NO PAIN
PAINLEVEL_OUTOF10: 0 - NO PAIN

## 2024-09-25 ASSESSMENT — PAIN - FUNCTIONAL ASSESSMENT: PAIN_FUNCTIONAL_ASSESSMENT: 0-10
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
## 2024-09-25 NOTE — NURSING NOTE
Assumed care of patient  A&Ox4   NO SI/HI  Laying in bed  1:1 sitter  Suicide and seizure precautions  Bed low and locked  Side rails x3 padded  Bed alarm on  Call light and  belongings in reach

## 2024-09-25 NOTE — ASSESSMENT & PLAN NOTE
Improving  Most recent value 129  Recently resume multiple psych/antiseizure medications  Hold Cymbalta  Neurology titrating down Trileptal  Fluid restriction  Nephrology following
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
## 2024-09-25 NOTE — CARE PLAN
The patient's goals for the shift include      The clinical goals for the shift include fluid restriction, monitor Na levels    Over the shift, the patient did not make progress toward the following goals. Barriers to progression include depression. Recommendations to address these barriers include therapy.

## 2024-09-25 NOTE — PROGRESS NOTES
Sodium up to 133, ok for discharge from renal standpoint when cleared by other teams. Continue current fluid restriction. Will sign off at this time, please call back with any questions, thank you. Patient can follow-up with us with a renal function panel in 1 week and office visit in 2 weeks.     Jose Doss MD

## 2024-09-25 NOTE — SIGNIFICANT EVENT
Application for Emergency Admission      Ready for Transfer?  Is the patient medically cleared for transfer to inpatient psychiatry: Yes  Has the patient been accepted to an inpatient psychiatric hospital:     Application for Emergency Admission  IN ACCORDANCE WITH [place]  The Chief Clinical Officer of:  9/25/2024 .3:09 PM    Reason for Hospitalization  The undersigned has reason to believe that: Erica Machado Is a mentally ill person subject to hospitalization by court order under division B Section 5122.01 of the Revised Code, i.e., this person:    1.No  Represents a substantial risk of physical harm to self as manifested by evidence of threats of, or attempts at, suicide or serious self-inflicted bodily harm    2.No Represents a substantial risk of physical harm to others as manifested by evidence of recent homicidal or other violent behavior, evidence of recent threats that place another in reasonable fear of violent behavior and serious physical harm, or other evidence of present dangerousness    3.Yes Represents a substantial and immediate risk of serious physical impairment or injury to self as manifested by  evidence that the person is unable to provide for and is not providing for the person's basic physical needs because of the person's mental illness and that appropriate provision for those needs cannot be made  immediately available in the community    4.Yes Would benefit from treatment in a hospital for his mental illness and is in need of such treatment as manifested by evidence of behavior that creates a grave and imminent risk to substantial rights of others or  himself.    5.Yes Would benefit from treatment as manifested by evidence of behavior that indicates all of the following:       (a) The person is unlikely to survive safely in the community without supervision, based on a clinical determination.       (b) The person has a history of lack of compliance with treatment  for mental illness and one of the following applies:      (i) At least twice within the thirty-six months prior to the filing of an affidavit seeking court-ordered treatment of the person under section 5122.111 of the Revised Code, the lack of compliance has been a significant factor in necessitating hospitalization in a hospital or receipt of services in a forensic or other mental health unit of a correctional facility, provided that the thirty-six-month period shall be extended by the length of any hospitalization or incarceration of the person that occurred within the thirty-six-month period.      (ii) Within the forty-eight months prior to the filing of an affidavit seeking court-ordered treatment of the person under section 5122.111 of the Revised Code, the lack of compliance resulted in one or more acts of serious violent behavior toward self or others or threats of, or attempts at, serious physical harm to self or others, provided that the forty-eight-month period shall be extended by the length of any hospitalization or incarceration of the person that occurred within the forty-eight-month period.      (c) The person, as a result of mental illness, is unlikely to voluntarily participate in necessary treatment.       (d) In view of the person's treatment history and current behavior, the person is in need of treatment in order to prevent a relapse or deterioration that would be likely to result in substantial risk of serious harm to the person or others.    (e) Represents a substantial risk of physical harm to self or others if allowed to remain at liberty pending examination.    Therefore, it is requested that said person be admitted to the above named facility.    STATEMENT OF BELIEF    Must be filled out by one of the following: a psychiatrist, licensed physician, licensed clinical psychologist, health or ,  or .  (Statement shall include the circumstances under which the  individual was taken into custody and the reason for the person's belief that hospitalization is necessary. The statement shall also include a reference to efforts made to secure the individual's property at his residence if he was taken into custody there. Every reasonable and appropriate effort should be made to take this person into custody in the least conspicuous manner possible.)    The patient has a past psychiatric history of schizoaffective disorder, bipolar type versus Bipolar I disorder with mood-congruent psychotic features, insomnia, and anxiety. She also has a past medical history of seizure disorder, hypertension, and hyperlipidemia. The patient was admitted to [facility] on 9/19/24 due to reports of seizure activity. Psychiatry was consulted for suicidal ideation.    The patient is unable to contract for safety, frequently changing her statements, which raises concern regarding her risk. She has made statements indicating a plan to take all of her pills or that she could easily find a gun. Given these factors, the patient would benefit from inpatient placement for her safety and to establish a proper medication regimen.    SHARLENE Zepeda 9/25/2024     _____________________________________________________________   Place of Employment: [facility] Psychiatry Department     STATEMENT OF OBSERVATION BY PSYCHIATRIST, LICENSED PHYSICIAN, OR LICENSED CLINICAL PSYCHOLOGIST, IF APPLICABLE    Place of Observation (e.g., [facility] mental health center, general hospital, office, emergency facility)  (If applicable, please complete)    SHARLENE Zepeda 9/25/2024    _____________________________________________________________

## 2024-09-25 NOTE — PROGRESS NOTES
09/25/24 1232   Discharge Planning   Expected Discharge Disposition Psych     Once medically clear pt to be placed by [facility] for inpatient psych

## 2024-09-26 LAB
ANION GAP SERPL CALCULATED.3IONS-SCNC: 13 MMOL/L (ref 10–20)
BASOPHILS # BLD AUTO: 0.05 X10*3/UL (ref 0–0.1)
BASOPHILS NFR BLD AUTO: 0.8 %
BUN SERPL-MCNC: 15 MG/DL (ref 6–23)
CALCIUM SERPL-MCNC: 9.5 MG/DL (ref 8.6–10.3)
CHLORIDE SERPL-SCNC: 100 MMOL/L (ref 98–107)
CO2 SERPL-SCNC: 24 MMOL/L (ref 21–32)
CREAT SERPL-MCNC: 0.79 MG/DL (ref 0.5–1.05)
EGFRCR SERPLBLD CKD-EPI 2021: 88 ML/MIN/1.73M*2
EOSINOPHIL # BLD AUTO: 0.3 X10*3/UL (ref 0–0.7)
EOSINOPHIL NFR BLD AUTO: 4.6 %
ERYTHROCYTE [DISTWIDTH] IN BLOOD BY AUTOMATED COUNT: 13.9 % (ref 11.5–14.5)
GLUCOSE SERPL-MCNC: 86 MG/DL (ref 74–99)
HCT VFR BLD AUTO: 37.7 % (ref 36–46)
HGB BLD-MCNC: 12.3 G/DL (ref 12–16)
IMM GRANULOCYTES # BLD AUTO: 0.03 X10*3/UL (ref 0–0.7)
IMM GRANULOCYTES NFR BLD AUTO: 0.5 % (ref 0–0.9)
LYMPHOCYTES # BLD AUTO: 1.39 X10*3/UL (ref 1.2–4.8)
LYMPHOCYTES NFR BLD AUTO: 21.5 %
MCH RBC QN AUTO: 32.2 PG (ref 26–34)
MCHC RBC AUTO-ENTMCNC: 32.6 G/DL (ref 32–36)
MCV RBC AUTO: 99 FL (ref 80–100)
MONOCYTES # BLD AUTO: 0.48 X10*3/UL (ref 0.1–1)
MONOCYTES NFR BLD AUTO: 7.4 %
NEUTROPHILS # BLD AUTO: 4.23 X10*3/UL (ref 1.2–7.7)
NEUTROPHILS NFR BLD AUTO: 65.2 %
NRBC BLD-RTO: 0 /100 WBCS (ref 0–0)
PLATELET # BLD AUTO: 262 X10*3/UL (ref 150–450)
POTASSIUM SERPL-SCNC: 4.5 MMOL/L (ref 3.5–5.3)
RBC # BLD AUTO: 3.82 X10*6/UL (ref 4–5.2)
SODIUM SERPL-SCNC: 132 MMOL/L (ref 136–145)
WBC # BLD AUTO: 6.5 X10*3/UL (ref 4.4–11.3)

## 2024-09-26 PROCEDURE — 99232 SBSQ HOSP IP/OBS MODERATE 35: CPT

## 2024-09-26 PROCEDURE — 2500000001 HC RX 250 WO HCPCS SELF ADMINISTERED DRUGS (ALT 637 FOR MEDICARE OP): Performed by: STUDENT IN AN ORGANIZED HEALTH CARE EDUCATION/TRAINING PROGRAM

## 2024-09-26 PROCEDURE — 80048 BASIC METABOLIC PNL TOTAL CA: CPT

## 2024-09-26 PROCEDURE — 2500000001 HC RX 250 WO HCPCS SELF ADMINISTERED DRUGS (ALT 637 FOR MEDICARE OP): Performed by: NURSE PRACTITIONER

## 2024-09-26 PROCEDURE — 85025 COMPLETE CBC W/AUTO DIFF WBC: CPT

## 2024-09-26 PROCEDURE — 2500000001 HC RX 250 WO HCPCS SELF ADMINISTERED DRUGS (ALT 637 FOR MEDICARE OP)

## 2024-09-26 PROCEDURE — 2500000001 HC RX 250 WO HCPCS SELF ADMINISTERED DRUGS (ALT 637 FOR MEDICARE OP): Performed by: INTERNAL MEDICINE

## 2024-09-26 PROCEDURE — 1200000002 HC GENERAL ROOM WITH TELEMETRY DAILY

## 2024-09-26 PROCEDURE — 36415 COLL VENOUS BLD VENIPUNCTURE: CPT

## 2024-09-26 PROCEDURE — 99232 SBSQ HOSP IP/OBS MODERATE 35: CPT | Performed by: STUDENT IN AN ORGANIZED HEALTH CARE EDUCATION/TRAINING PROGRAM

## 2024-09-26 RX ORDER — SUMATRIPTAN SUCCINATE 25 MG/1
50 TABLET ORAL EVERY 2 HOUR PRN
Status: DISCONTINUED | OUTPATIENT
Start: 2024-09-26 | End: 2024-09-27 | Stop reason: HOSPADM

## 2024-09-26 ASSESSMENT — PAIN DESCRIPTION - DESCRIPTORS: DESCRIPTORS: ACHING;DISCOMFORT;DULL

## 2024-09-26 ASSESSMENT — ENCOUNTER SYMPTOMS
WEAKNESS: 1
ARTHRALGIAS: 1
NERVOUS/ANXIOUS: 1
MYALGIAS: 1
HALLUCINATIONS: 1
FATIGUE: 1
CONFUSION: 0
AGITATION: 0
SLEEP DISTURBANCE: 1
DYSPHORIC MOOD: 1
ACTIVITY CHANGE: 1
HYPERACTIVE: 0
DECREASED CONCENTRATION: 1

## 2024-09-26 ASSESSMENT — PAIN SCALES - GENERAL
PAINLEVEL_OUTOF10: 3
PAINLEVEL_OUTOF10: 0 - NO PAIN

## 2024-09-26 ASSESSMENT — PAIN - FUNCTIONAL ASSESSMENT: PAIN_FUNCTIONAL_ASSESSMENT: 0-10

## 2024-09-26 NOTE — ASSESSMENT & PLAN NOTE
Does report a head injury prior to admission  CT head showed no acute process  Continue anti seizure medications per neurology recs  Seizure precautions  Monitor on tele  EEG showed no seizure activity--repeat routine EEG has been ordered
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
## 2024-09-26 NOTE — PROGRESS NOTES
Pt has been having intermittent headaches. Message sent to neuro w/ pt request for prn medication.     Imitrex ordered, message sent to pharmacy to send dose.

## 2024-09-26 NOTE — ASSESSMENT & PLAN NOTE
Stable  Recently resume multiple psych/antiseizure medications  Hold Cymbalta  Neurology titrating down Trileptal  Fluid restriction  Nephrology following
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
## 2024-09-26 NOTE — ASSESSMENT & PLAN NOTE
Continue suicide precautions  Sitter at bedside  Consult psych, patient meets inpatient psych criteria

## 2024-09-27 ENCOUNTER — APPOINTMENT (OUTPATIENT)
Dept: CARDIOLOGY | Facility: HOSPITAL | Age: 59
End: 2024-09-27
Payer: MEDICAID

## 2024-09-27 ENCOUNTER — PHARMACY VISIT (OUTPATIENT)
Dept: PHARMACY | Facility: CLINIC | Age: 59
End: 2024-09-27
Payer: MEDICAID

## 2024-09-27 ENCOUNTER — HOSPITAL ENCOUNTER (EMERGENCY)
Facility: HOSPITAL | Age: 59
Discharge: PSYCHIATRIC OR SUBSTANCE USE FACILITY | End: 2024-09-28
Attending: EMERGENCY MEDICINE
Payer: MEDICAID

## 2024-09-27 VITALS
RESPIRATION RATE: 18 BRPM | DIASTOLIC BLOOD PRESSURE: 86 MMHG | TEMPERATURE: 97.5 F | HEART RATE: 99 BPM | SYSTOLIC BLOOD PRESSURE: 142 MMHG | BODY MASS INDEX: 30.47 KG/M2 | HEIGHT: 68 IN | WEIGHT: 201.06 LBS | OXYGEN SATURATION: 98 %

## 2024-09-27 DIAGNOSIS — F32.A DEPRESSION WITH SUICIDAL IDEATION: Primary | ICD-10-CM

## 2024-09-27 DIAGNOSIS — R45.851 DEPRESSION WITH SUICIDAL IDEATION: Primary | ICD-10-CM

## 2024-09-27 LAB
ALBUMIN SERPL BCP-MCNC: 5.1 G/DL (ref 3.4–5)
ALP SERPL-CCNC: 71 U/L (ref 33–110)
ALT SERPL W P-5'-P-CCNC: 18 U/L (ref 7–45)
AMPHETAMINES UR QL SCN: NORMAL
ANION GAP SERPL CALC-SCNC: 17 MMOL/L (ref 10–20)
APAP SERPL-MCNC: <10 UG/ML
APPEARANCE UR: CLEAR
AST SERPL W P-5'-P-CCNC: 18 U/L (ref 9–39)
BARBITURATES UR QL SCN: NORMAL
BASOPHILS # BLD AUTO: 0.07 X10*3/UL (ref 0–0.1)
BASOPHILS NFR BLD AUTO: 0.6 %
BENZODIAZ UR QL SCN: NORMAL
BILIRUB SERPL-MCNC: 0.4 MG/DL (ref 0–1.2)
BILIRUB UR STRIP.AUTO-MCNC: NEGATIVE MG/DL
BUN SERPL-MCNC: 13 MG/DL (ref 6–23)
BZE UR QL SCN: NORMAL
CALCIUM SERPL-MCNC: 10.4 MG/DL (ref 8.6–10.3)
CANNABINOIDS UR QL SCN: NORMAL
CHLORIDE SERPL-SCNC: 96 MMOL/L (ref 98–107)
CO2 SERPL-SCNC: 21 MMOL/L (ref 21–32)
COLOR UR: COLORLESS
CREAT SERPL-MCNC: 0.78 MG/DL (ref 0.5–1.05)
EGFRCR SERPLBLD CKD-EPI 2021: 89 ML/MIN/1.73M*2
EOSINOPHIL # BLD AUTO: 0.22 X10*3/UL (ref 0–0.7)
EOSINOPHIL NFR BLD AUTO: 1.8 %
ERYTHROCYTE [DISTWIDTH] IN BLOOD BY AUTOMATED COUNT: 13.2 % (ref 11.5–14.5)
FENTANYL+NORFENTANYL UR QL SCN: NORMAL
GLUCOSE SERPL-MCNC: 115 MG/DL (ref 74–99)
GLUCOSE UR STRIP.AUTO-MCNC: NORMAL MG/DL
HCT VFR BLD AUTO: 38.8 % (ref 36–46)
HGB BLD-MCNC: 13 G/DL (ref 12–16)
IMM GRANULOCYTES # BLD AUTO: 0.04 X10*3/UL (ref 0–0.7)
IMM GRANULOCYTES NFR BLD AUTO: 0.3 % (ref 0–0.9)
KETONES UR STRIP.AUTO-MCNC: NEGATIVE MG/DL
LEUKOCYTE ESTERASE UR QL STRIP.AUTO: NEGATIVE
LYMPHOCYTES # BLD AUTO: 2.02 X10*3/UL (ref 1.2–4.8)
LYMPHOCYTES NFR BLD AUTO: 16.7 %
MCH RBC QN AUTO: 32.3 PG (ref 26–34)
MCHC RBC AUTO-ENTMCNC: 33.5 G/DL (ref 32–36)
MCV RBC AUTO: 96 FL (ref 80–100)
METHADONE UR QL SCN: NORMAL
MONOCYTES # BLD AUTO: 0.84 X10*3/UL (ref 0.1–1)
MONOCYTES NFR BLD AUTO: 7 %
NEUTROPHILS # BLD AUTO: 8.87 X10*3/UL (ref 1.2–7.7)
NEUTROPHILS NFR BLD AUTO: 73.6 %
NITRITE UR QL STRIP.AUTO: NEGATIVE
NRBC BLD-RTO: 0 /100 WBCS (ref 0–0)
OPIATES UR QL SCN: NORMAL
OXYCODONE+OXYMORPHONE UR QL SCN: NORMAL
PCP UR QL SCN: NORMAL
PH UR STRIP.AUTO: 6 [PH]
PLATELET # BLD AUTO: 355 X10*3/UL (ref 150–450)
POTASSIUM SERPL-SCNC: 3.9 MMOL/L (ref 3.5–5.3)
PROT SERPL-MCNC: 8.2 G/DL (ref 6.4–8.2)
PROT UR STRIP.AUTO-MCNC: NEGATIVE MG/DL
RBC # BLD AUTO: 4.03 X10*6/UL (ref 4–5.2)
RBC # UR STRIP.AUTO: NEGATIVE /UL
SALICYLATES SERPL-MCNC: <3 MG/DL
SARS-COV-2 RNA RESP QL NAA+PROBE: NOT DETECTED
SODIUM SERPL-SCNC: 130 MMOL/L (ref 136–145)
SP GR UR STRIP.AUTO: 1
UROBILINOGEN UR STRIP.AUTO-MCNC: NORMAL MG/DL
WBC # BLD AUTO: 12.1 X10*3/UL (ref 4.4–11.3)

## 2024-09-27 PROCEDURE — 2500000001 HC RX 250 WO HCPCS SELF ADMINISTERED DRUGS (ALT 637 FOR MEDICARE OP)

## 2024-09-27 PROCEDURE — 80179 DRUG ASSAY SALICYLATE: CPT | Performed by: EMERGENCY MEDICINE

## 2024-09-27 PROCEDURE — 2500000001 HC RX 250 WO HCPCS SELF ADMINISTERED DRUGS (ALT 637 FOR MEDICARE OP): Performed by: NURSE PRACTITIONER

## 2024-09-27 PROCEDURE — 81003 URINALYSIS AUTO W/O SCOPE: CPT | Performed by: EMERGENCY MEDICINE

## 2024-09-27 PROCEDURE — 36415 COLL VENOUS BLD VENIPUNCTURE: CPT | Performed by: EMERGENCY MEDICINE

## 2024-09-27 PROCEDURE — 80143 DRUG ASSAY ACETAMINOPHEN: CPT | Performed by: EMERGENCY MEDICINE

## 2024-09-27 PROCEDURE — 93005 ELECTROCARDIOGRAM TRACING: CPT

## 2024-09-27 PROCEDURE — 87635 SARS-COV-2 COVID-19 AMP PRB: CPT | Performed by: EMERGENCY MEDICINE

## 2024-09-27 PROCEDURE — 85025 COMPLETE CBC W/AUTO DIFF WBC: CPT | Performed by: EMERGENCY MEDICINE

## 2024-09-27 PROCEDURE — RXMED WILLOW AMBULATORY MEDICATION CHARGE

## 2024-09-27 PROCEDURE — 99231 SBSQ HOSP IP/OBS SF/LOW 25: CPT | Performed by: STUDENT IN AN ORGANIZED HEALTH CARE EDUCATION/TRAINING PROGRAM

## 2024-09-27 PROCEDURE — 80053 COMPREHEN METABOLIC PANEL: CPT | Performed by: EMERGENCY MEDICINE

## 2024-09-27 PROCEDURE — 2500000001 HC RX 250 WO HCPCS SELF ADMINISTERED DRUGS (ALT 637 FOR MEDICARE OP): Performed by: STUDENT IN AN ORGANIZED HEALTH CARE EDUCATION/TRAINING PROGRAM

## 2024-09-27 PROCEDURE — 87086 URINE CULTURE/COLONY COUNT: CPT | Mod: GENLAB | Performed by: EMERGENCY MEDICINE

## 2024-09-27 PROCEDURE — 99233 SBSQ HOSP IP/OBS HIGH 50: CPT

## 2024-09-27 PROCEDURE — 80307 DRUG TEST PRSMV CHEM ANLYZR: CPT | Performed by: EMERGENCY MEDICINE

## 2024-09-27 PROCEDURE — 99285 EMERGENCY DEPT VISIT HI MDM: CPT

## 2024-09-27 PROCEDURE — 2500000001 HC RX 250 WO HCPCS SELF ADMINISTERED DRUGS (ALT 637 FOR MEDICARE OP): Performed by: INTERNAL MEDICINE

## 2024-09-27 RX ORDER — LAMOTRIGINE 25 MG/1
25 TABLET ORAL DAILY
Qty: 9 TABLET | Refills: 0 | Status: SHIPPED | OUTPATIENT
Start: 2024-09-28 | End: 2024-10-07

## 2024-09-27 RX ORDER — SUMATRIPTAN 50 MG/1
50 TABLET, FILM COATED ORAL EVERY 2 HOUR PRN
Start: 2024-09-27

## 2024-09-27 RX ORDER — LACOSAMIDE 150 MG/1
150 TABLET ORAL 2 TIMES DAILY
Start: 2024-09-27

## 2024-09-27 RX ORDER — AMLODIPINE BESYLATE 5 MG/1
5 TABLET ORAL DAILY
Qty: 30 TABLET | Refills: 0 | Status: SHIPPED | OUTPATIENT
Start: 2024-09-28 | End: 2024-10-28

## 2024-09-27 RX ORDER — DULOXETIN HYDROCHLORIDE 30 MG/1
30 CAPSULE, DELAYED RELEASE ORAL DAILY
Start: 2024-09-27

## 2024-09-27 SDOH — HEALTH STABILITY: MENTAL HEALTH: IN THE PAST WEEK, HAVE YOU BEEN HAVING THOUGHTS ABOUT KILLING YOURSELF?: YES

## 2024-09-27 SDOH — HEALTH STABILITY: MENTAL HEALTH
OTHER SUICIDE PRECAUTIONS INCLUDE: PATIENT PLACED IN AN EASILY OBSERVABLE ROOM WITH DOOR/CURTAIN REMAINING OPEN;REMAINING RISKS IDENTIFIED AND MITIGATED;PATIENT PLACED IN PSYCH SAFE ROOM (IF AVAILABLE);HOURLY BEHAVIORAL ASSESSMENT PERFORMED;TREATMENT PLAN BASED ON RISK FACTORS DEVELOPED (

## 2024-09-27 SDOH — HEALTH STABILITY: MENTAL HEALTH: ACTIVE SUICIDAL IDEATION WITH SPECIFIC PLAN AND INTENT (PAST 1 MONTH): YES

## 2024-09-27 SDOH — HEALTH STABILITY: MENTAL HEALTH: HOW DID YOU TRY TO KILL YOURSELF?: OD ON TYLENOL

## 2024-09-27 SDOH — HEALTH STABILITY: MENTAL HEALTH: ARE YOU HAVING THOUGHTS OF KILLING YOURSELF RIGHT NOW?: YES

## 2024-09-27 SDOH — HEALTH STABILITY: MENTAL HEALTH: SUICIDAL BEHAVIOR (3 MONTHS): YES

## 2024-09-27 SDOH — HEALTH STABILITY: MENTAL HEALTH: SUICIDAL BEHAVIOR (LIFETIME): YES

## 2024-09-27 SDOH — HEALTH STABILITY: MENTAL HEALTH: NON-SPECIFIC ACTIVE SUICIDAL THOUGHTS (PAST 1 MONTH): YES

## 2024-09-27 SDOH — HEALTH STABILITY: MENTAL HEALTH: IN THE PAST FEW WEEKS, HAVE YOU WISHED YOU WERE DEAD?: NO

## 2024-09-27 SDOH — HEALTH STABILITY: MENTAL HEALTH: ANXIETY SYMPTOMS: GENERALIZED

## 2024-09-27 SDOH — ECONOMIC STABILITY: HOUSING INSECURITY: FEELS SAFE LIVING IN HOME: NO

## 2024-09-27 SDOH — HEALTH STABILITY: MENTAL HEALTH: WISH TO BE DEAD (PAST 1 MONTH): YES

## 2024-09-27 SDOH — HEALTH STABILITY: MENTAL HEALTH: IN THE PAST FEW WEEKS, HAVE YOU FELT THAT YOU OR YOUR FAMILY WOULD BE BETTER OFF IF YOU WERE DEAD?: NO

## 2024-09-27 SDOH — HEALTH STABILITY: MENTAL HEALTH: ACTIVE SUICIDAL IDEATION WITH SOME INTENT TO ACT, WITHOUT SPECIFIC PLAN (PAST 1 MONTH): YES

## 2024-09-27 SDOH — HEALTH STABILITY: MENTAL HEALTH: FOR HIGH RISK PATIENTS: ALL INTERVENTIONS ABOVE, PLUS:;1:1 PATIENT OBSERVER AT ALL TIMES

## 2024-09-27 SDOH — HEALTH STABILITY: MENTAL HEALTH: HAVE YOU EVER TRIED TO KILL YOURSELF?: YES

## 2024-09-27 SDOH — HEALTH STABILITY: MENTAL HEALTH

## 2024-09-27 SDOH — HEALTH STABILITY: MENTAL HEALTH: DEPRESSION SYMPTOMS: FEELINGS OF HELPLESSNESS;FEELINGS OF HOPELESSESS;FEELINGS OF WORTHLESSNESS

## 2024-09-27 SDOH — HEALTH STABILITY: MENTAL HEALTH: SUICIDE ASSESSMENT: ADULT (C-SSRS)

## 2024-09-27 ASSESSMENT — ENCOUNTER SYMPTOMS
HALLUCINATIONS: 1
AGITATION: 0
MYALGIAS: 1
ARTHRALGIAS: 1
DYSPHORIC MOOD: 1
SLEEP DISTURBANCE: 1
HYPERACTIVE: 0
FATIGUE: 1
CONFUSION: 0
DECREASED CONCENTRATION: 1
WEAKNESS: 1
ACTIVITY CHANGE: 1
NERVOUS/ANXIOUS: 1

## 2024-09-27 ASSESSMENT — LIFESTYLE VARIABLES
SUBSTANCE_ABUSE_PAST_12_MONTHS: NO
PRESCIPTION_ABUSE_PAST_12_MONTHS: NO

## 2024-09-27 ASSESSMENT — PAIN SCALES - GENERAL
PAINLEVEL_OUTOF10: 3
PAINLEVEL_OUTOF10: 3

## 2024-09-27 ASSESSMENT — PAIN - FUNCTIONAL ASSESSMENT
PAIN_FUNCTIONAL_ASSESSMENT: 0-10

## 2024-09-27 ASSESSMENT — PAIN DESCRIPTION - DESCRIPTORS
DESCRIPTORS: ACHING;DULL;DISCOMFORT
DESCRIPTORS: ACHING;DULL;DISCOMFORT

## 2024-09-27 NOTE — CARE PLAN
Problem: Risk for Suicide  Goal: No self harm this shift  Outcome: Progressing     Problem: Risk for Suicide  Goal: Makes needs known through verbalization or behaviors this shift  Outcome: Met   The patient's goals for the shift include  discharge    The clinical goals for the shift include: safety monitoring, dc plan

## 2024-09-27 NOTE — PROGRESS NOTES
Per psych CNP, the facility they he is trying to get her a bed at is requestring the following tests be ordered, completed, and results faxed to them prior to accewpting her. Message sent to primary w/ request for the orders.   CBC, CMP/BMP, etoh and tox screen, UA, and covid swab.     After psych re-assessment pt is not cleared from them for discharge, constant observation orders discontinued per psych NP. Message sent to primary requesting dc order.
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
## 2024-09-27 NOTE — PROGRESS NOTES
"Nutrition Initial Assessment:   Nutrition Assessment    Reason for Assessment: Length of stay    Patient is a 58 y.o. female presenting from home after a seizure. PMH of schizoaffective disorder, bipolar disorder, anxiety, depression, hypertension, hyperlipidemia, hypothyroidism, migraines, seizure disorder, and COPD. Pt believes she is being discharged later today.    Nutrition History:  Energy Intake: Good > 75 % (100% x 3 meals on 9/25/24)  Food and Nutrient History: Pt reports her PO intake PTA was not great due to stressors at home. Reports since admit, she has been eating well but is still hungry after meals. She is agreeable to trying an oral nutrition supplement once daily unless she is discharged today.  Food Allergies/Intolerances:   Noted allergy to FeSO4  GI Symptoms:  Pt denies n/v/d/c  Oral Problems:  missing teeth, dentures     Anthropometrics:  Height: 172.7 cm (5' 7.99\")   Weight: 91.2 kg (201 lb 1 oz)   BMI (Calculated): 30.58  IBW/kg (Dietitian Calculated): 63.6 kg  Percent of IBW: 143 %     Weight Change %:  Weight History / % Weight Change: Pt reports UBW of 196-198 lbs  Significant Weight Loss: No    Nutrition Focused Physical Exam Findings:  defer: remote assessment    Nutrition Significant Labs:  BMP Trend:   Results from last 7 days   Lab Units 09/26/24  0437 09/25/24  0815 09/24/24  1352 09/24/24  1010   GLUCOSE mg/dL 86 98 116* 160*   CALCIUM mg/dL 9.5 10.2 9.4 9.3   SODIUM mmol/L 132* 133* 131* 129*   POTASSIUM mmol/L 4.5 4.6 4.5 4.1   CO2 mmol/L 24 26 24 23   CHLORIDE mmol/L 100 100 100 98   BUN mg/dL 15 12 13 13   CREATININE mg/dL 0.79 0.77 0.78 0.67        Nutrition Specific Medications:  Noted norvasc, levothyroxine, remeron, miralax    I/O:   Last BM Date: 09/18/24 (pt stated this is normal);      Dietary Orders (From admission, onward)       Start     Ordered    09/27/24 1245  Oral nutritional supplements  Until discontinued        Question Answer Comment   Deliver with Breakfast  "   Deliver with Dinner    Select supplement: Magic Cup        09/27/24 1244    09/24/24 1123  Adult diet Regular, 2-3 grams sodium; 1200 mL fluid  Diet effective now        Question Answer Comment   Diet type Regular    Diet type 2-3 grams sodium    Dietary fluid restriction / 24h: 1200 mL fluid        09/24/24 1122    09/19/24 1928  Diet Tray Safety tray  Until discontinued        Question:  Select tray type:  Answer:  Safety tray    09/19/24 1927                     Estimated Needs:   Total Energy Estimated Needs (kCal): 1908 kCal  Method for Estimating Needs: 25-30 kcal/kg ideal body weight  Total Protein Estimated Needs (g): 63 g  Method for Estimating Needs: 0.8-1.0 gm/kg ideal body weight  Total Fluid Estimated Needs (mL): 1200 mL  Method for Estimating Needs: 1200 mL fluid restriction        Nutrition Diagnosis   Malnutrition Diagnosis  Patient has Malnutrition Diagnosis: No    Nutrition Diagnosis  Patient has Nutrition Diagnosis: No       Nutrition Interventions/Recommendations         Nutrition Prescription:  Individualized Nutrition Prescription Provided for : Continue current diet as ordered        Nutrition Interventions:   Interventions: Meals and snacks, Medical food supplement  Goal: Continue to meet % of estimated needs via PO intakes  Medical Food Supplement: Commercial beverage (Ensure Plus (chocolate))  Goal: Initiate orders for Magic Cup (chocolate), once daily at dinner      Nutrition Monitoring and Evaluation   Food/Nutrient Related History Monitoring  Monitoring and Evaluation Plan: Amount of food, Fluid intake  Fluid Intake: Estimated fluid intake  Amount of Food: Estimated amout of food, Medical food intake    Body Composition/Growth/Weight History  Monitoring and Evaluation Plan: Weight  Weight: Measured weight    Biochemical Data, Medical Tests and Procedures  Monitoring and Evaluation Plan: Electrolyte/renal panel    09/27/24 at 12:48 PM - CA COURTNEY RDN, LD    Time Spent (min): 20  minutes

## 2024-09-27 NOTE — DISCHARGE SUMMARY
Discharge Diagnosis  Seizure (Multi)  Intentional suicide attempt using acetaminophen     Issues Requiring Follow-Up  Seizure (Multi)-follow up with neurology, psychiatry   Intentional suicide attempt using acetaminophen--voluntary admission to inpatient psych     Discharge Meds     Medication List      START taking these medications     amLODIPine 5 mg tablet; Commonly known as: Norvasc; Take 1 tablet (5 mg)   by mouth once daily.; Start taking on: September 28, 2024   DULoxetine 30 mg DR capsule; Commonly known as: Cymbalta; Take 1 capsule   (30 mg) by mouth once daily. Do not crush or chew.     CHANGE how you take these medications     lacosamide 150 mg tablet tablet; Commonly known as: Vimpat; Take 1   tablet (150 mg) by mouth 2 times a day.; What changed: medication   strength, how much to take   lamoTRIgine 25 mg tablet; Commonly known as: LaMICtal; Take 1 tablet (25   mg) by mouth once daily for 9 doses.; Start taking on: September 28, 2024;   What changed: how much to take   SUMAtriptan 50 mg tablet; Commonly known as: Imitrex; Take 1 tablet (50   mg) by mouth every 2 hours if needed for migraine for up to 1 dose. May   repeat dose once in 2 hours if no relief.  Do not exceed 2 doses in 24   hours.; What changed: when to take this     CONTINUE taking these medications     albuterol 108 (90 Base) MCG/ACT inhaler   busPIRone 15 mg tablet; Commonly known as: Buspar   Dulera 200-5 mcg/actuation inhaler; Generic drug: mometasone-formoterol   fenofibrate 160 mg tablet; Commonly known as: Triglide   gabapentin 300 mg capsule; Commonly known as: Neurontin   levothyroxine 150 mcg tablet; Commonly known as: Synthroid, Levoxyl;   Take 1 tablet (150 mcg) by mouth early in the morning..   lisinopril 40 mg tablet   meloxicam 7.5 mg tablet; Commonly known as: Mobic   mirtazapine 15 mg tablet; Commonly known as: Remeron   ondansetron 4 mg tablet; Commonly known as: Zofran   pantoprazole 40 mg EC tablet; Commonly known as:  ProtoNix   traZODone 300 mg tablet; Commonly known as: Desyrel     STOP taking these medications     benztropine 0.5 mg tablet; Commonly known as: Cogentin   Clenpiq 10 mg-3.5 gram- 12 gram/175 mL solution; Generic drug: sod   picosulf-mag ox-citric ac   desvenlafaxine 50 mg 24 hr tablet; Commonly known as: Pristiq   prazosin 1 mg capsule; Commonly known as: Minipress   sucralfate 1 gram tablet; Commonly known as: Carafate   Ubrelvy 100 mg tablet tablet; Generic drug: ubrogepant   vilazodone 40 mg tablet; Commonly known as: Viibryd   Zavzpret 10 mg/actuation spray,non-aerosol; Generic drug: zavegepant   zolpidem 10 mg tablet; Commonly known as: Ambien       Test Results Pending At Discharge  Pending Labs       No current pending labs.            Hospital Course  58 year old female with a pmhx of seizures, extensive pysch history significant for schizoaffective disorder admitted 9/19/24 for attempted suicide attempt using acetaminophen. Consulted psych. Placed on suicide precautions include bedside sitter. Hospital course complicated by persistent hyponatremia which is now resolved and stabilized. Was deemed necessary to admit to inpatient psych, then was deemed clear to dc home by psychiatry with the expectation that patient will voluntarily admit herself to an inpatient psychiatric facility.  At this point patient is demanding to be discharged.  Will discharge home.    Pertinent Physical Exam At Time of Discharge  Physical Exam  Vitals and nursing note reviewed.   Constitutional:       Appearance: Normal appearance.   HENT:      Head: Normocephalic and atraumatic.      Mouth/Throat:      Mouth: Mucous membranes are moist.      Comments: Poor oral dentition    Eyes:      Extraocular Movements: Extraocular movements intact.   Cardiovascular:      Rate and Rhythm: Normal rate and regular rhythm.      Pulses: Normal pulses.   Pulmonary:      Effort: Pulmonary effort is normal.      Breath sounds: Normal breath sounds.    Abdominal:      General: Bowel sounds are normal.      Palpations: Abdomen is soft.   Musculoskeletal:         General: Normal range of motion.      Cervical back: Normal range of motion and neck supple.   Skin:     General: Skin is warm and dry.      Capillary Refill: Capillary refill takes less than 2 seconds.   Neurological:      General: No focal deficit present.      Mental Status: She is alert and oriented to person, place, and time.   Psychiatric:         Mood and Affect: Mood normal.       Outpatient Follow-Up  Future Appointments   Date Time Provider Department [place]   2/18/2025  9:00 [facility]   2/25/2025  7:30 AM Destin Pinzon MD [address]         MIKKI Cat-CNP

## 2024-09-27 NOTE — PROGRESS NOTES
Social Work Note    Pt declined from [facility] due to medical acuity. Pt reportedly requested referral to [facility] yesterday. Voicemail was left at 1300 on 9/26 without a return call. SW contacted [facility] this morning and inquired about bed availability. Per [facility] staff, they have beds open and will review referral. Referral faxed.     1020 TCF [facility] requesting additional lab work. Informed PAT Barnes and other providers.   1309 Spoke w/ Alyssa Barnes again who shares it is okay to refer to other facilities. Referral sent to [facility] for review.   1414 TCF Alyssa Barnes who shares that pt is appropriate for discharge and no longer requires placement. TCT [facility] to cancel referral.
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
## 2024-09-27 NOTE — CARE PLAN
The patient's goals for the shift include  sleep  The clinical goals for the shift include rest, safety.

## 2024-09-28 VITALS
SYSTOLIC BLOOD PRESSURE: 144 MMHG | DIASTOLIC BLOOD PRESSURE: 97 MMHG | RESPIRATION RATE: 18 BRPM | WEIGHT: 196 LBS | OXYGEN SATURATION: 96 % | TEMPERATURE: 98.4 F | BODY MASS INDEX: 30.76 KG/M2 | HEIGHT: 67 IN | HEART RATE: 81 BPM

## 2024-09-28 PROCEDURE — 2500000001 HC RX 250 WO HCPCS SELF ADMINISTERED DRUGS (ALT 637 FOR MEDICARE OP): Mod: SE | Performed by: EMERGENCY MEDICINE

## 2024-09-28 RX ORDER — ACETAMINOPHEN 325 MG/1
650 TABLET ORAL ONCE
Status: COMPLETED | OUTPATIENT
Start: 2024-09-28 | End: 2024-09-28

## 2024-09-28 RX ADMIN — ACETAMINOPHEN 650 MG: 325 TABLET ORAL at 05:57

## 2024-09-28 SDOH — HEALTH STABILITY: MENTAL HEALTH: HAVE YOU EVER DONE ANYTHING, STARTED TO DO ANYTHING, OR PREPARED TO DO ANYTHING TO END YOUR LIFE?: YES

## 2024-09-28 SDOH — HEALTH STABILITY: MENTAL HEALTH: BEHAVIORAL HEALTH(WDL): EXCEPTIONS TO WDL

## 2024-09-28 SDOH — HEALTH STABILITY: MENTAL HEALTH: WAS THIS WITHIN THE PAST THREE MONTHS?: YES

## 2024-09-28 SDOH — HEALTH STABILITY: MENTAL HEALTH: BEHAVIORS/MOOD: COOPERATIVE;FLAT AFFECT;PLEASANT

## 2024-09-28 SDOH — HEALTH STABILITY: MENTAL HEALTH: HAVE YOU EVER TRIED TO KILL YOURSELF?: YES

## 2024-09-28 SDOH — HEALTH STABILITY: MENTAL HEALTH: IN THE PAST WEEK, HAVE YOU BEEN HAVING THOUGHTS ABOUT KILLING YOURSELF?: YES

## 2024-09-28 SDOH — HEALTH STABILITY: MENTAL HEALTH: CONTENT: BLAMING SELF

## 2024-09-28 SDOH — HEALTH STABILITY: MENTAL HEALTH: BEHAVIORS/MOOD: CALM;COOPERATIVE;SAD

## 2024-09-28 SDOH — HEALTH STABILITY: MENTAL HEALTH: BEHAVIORS/MOOD: COOPERATIVE;WITHDRAWN;CALM

## 2024-09-28 SDOH — SOCIAL STABILITY: SOCIAL NETWORK: EMOTIONAL SUPPORT GIVEN: REASSURE

## 2024-09-28 SDOH — HEALTH STABILITY: MENTAL HEALTH: RISK OF SUICIDE: HIGH RISK

## 2024-09-28 SDOH — SOCIAL STABILITY: SOCIAL NETWORK: PARENT/GUARDIAN/SIGNIFICANT OTHER INVOLVEMENT: NO INVOLVEMENT

## 2024-09-28 SDOH — SOCIAL STABILITY: SOCIAL NETWORK: VISITOR BEHAVIORS: UNABLE TO ASSESS

## 2024-09-28 SDOH — HEALTH STABILITY: MENTAL HEALTH: HAVE YOU HAD THESE THOUGHTS AND HAD SOME INTENTION OF ACTING ON THEM?: YES

## 2024-09-28 SDOH — HEALTH STABILITY: MENTAL HEALTH: ARE YOU HAVING THOUGHTS OF KILLING YOURSELF RIGHT NOW?: YES

## 2024-09-28 SDOH — HEALTH STABILITY: MENTAL HEALTH: HAVE YOU WISHED YOU WERE DEAD OR WISHED YOU COULD GO TO SLEEP AND NOT WAKE UP?: YES

## 2024-09-28 SDOH — HEALTH STABILITY: MENTAL HEALTH: HAVE YOU ACTUALLY HAD ANY THOUGHTS OF KILLING YOURSELF?: YES

## 2024-09-28 SDOH — SOCIAL STABILITY: SOCIAL INSECURITY: FAMILY BEHAVIORS: UNABLE TO ASSESS

## 2024-09-28 SDOH — HEALTH STABILITY: MENTAL HEALTH: HAVE YOU BEEN THINKING ABOUT HOW YOU MIGHT DO THIS?: YES

## 2024-09-28 SDOH — HEALTH STABILITY: MENTAL HEALTH: NEEDS EXPRESSED: DENIES

## 2024-09-28 SDOH — HEALTH STABILITY: MENTAL HEALTH

## 2024-09-28 SDOH — HEALTH STABILITY: MENTAL HEALTH: DELUSIONS: OTHER (COMMENT)

## 2024-09-28 SDOH — HEALTH STABILITY: MENTAL HEALTH: HALLUCINATION: OTHER (COMMENT)

## 2024-09-28 SDOH — HEALTH STABILITY: MENTAL HEALTH: IN THE PAST FEW WEEKS, HAVE YOU WISHED YOU WERE DEAD?: YES

## 2024-09-28 SDOH — HEALTH STABILITY: MENTAL HEALTH: SUICIDE ASSESSMENT: ADULT (C-SSRS)

## 2024-09-28 SDOH — HEALTH STABILITY: MENTAL HEALTH: BEHAVIORS/MOOD: COOPERATIVE;CALM;SAD

## 2024-09-28 SDOH — HEALTH STABILITY: MENTAL HEALTH: IN THE PAST FEW WEEKS, HAVE YOU FELT THAT YOU OR YOUR FAMILY WOULD BE BETTER OFF IF YOU WERE DEAD?: YES

## 2024-09-28 SDOH — HEALTH STABILITY: MENTAL HEALTH: CONTENT: UNREMARKABLE

## 2024-09-28 ASSESSMENT — PAIN SCALES - GENERAL
PAINLEVEL_OUTOF10: 8
PAINLEVEL_OUTOF10: 7
PAINLEVEL_OUTOF10: 0 - NO PAIN

## 2024-09-28 NOTE — PROGRESS NOTES
"EPAT - Social Work Psychiatric Assessment    Arrival Details  Mode of Arrival: Ambulatory  Admission Source: Home  Admission Type: Involuntary  EPAT Assessment Start Date: 09/27/24  EPAT Assessment Start Time: 2330  Name of : Leela BRAND    History of Present Illness    Admission Reason: Evaluation    HPI:   Patient is a 58 year old female brought self to ED endorsing SI. Patient has a history of schizoaffective disorder,bipolar type vs. Bipolar I disorder with mood-congruent psychotic features, insomnia, and SANDEEP. Patient has a history of seizures. Provider note and chart history reviewed.  Chart records indicate patient was in ED on 9/19/2024 for suicide attempt, OD on tylenol, admitted to inpatient psychiatric services, discharged today. When patient arrived back home to her sister's house, her belongings were packed up, patient has no where to go, became suicidal, returned to ED. Patient was tearful throughout the assessment, \"I have no one left in my life, I just want to die. Patient reports to having plans to OD on tylenol or MH medications, identified to feeling worthless, hopeless, helpless. Patient is a consumer through [employer] for her MH needs. Patient denies HI,denies, AH,VH. Collateral information gained from son Magdy, \"This is what my mom (patient) does. She says she is suicidal when things upset her, but never has really attempted, it's her mental illness, this is normal behavior for my mom(patient).    SW Readmission Information   Readmission within 30 Days: Yes  Previous ED Visit Date and Reason : 9/19/2024  Previous Discharge Date and Location: [facility] 9/27/2024  Factors Contributing to  Readmission Inpatient/ED (Team Perspective): Lack of Family/Friend Support  Factors Contributing to Readmission (Patient/Family Perspective): \"I wasn't ready to go\"  Readmission From: Other (Comment)    Psychiatric Symptoms  Anxiety Symptoms: Generalized  Depression Symptoms: " Feelings of helplessness, Feelings of hopelessess, Feelings of worthlessness  Claire Symptoms: No problems reported or observed.    Psychosis Symptoms  Hallucination Type: No problems reported or observed.  Delusion Type: No problems reported or observed.    Additional Symptoms - Adult  Generalized Anxiety Disorder: Difficulity concentrating, Difficult to control worry, Excessive anxiety/worry  Obsessive Compulsive Disorder: No problems reported or observed.  Panic Attack: No problems reported or observed.  Post Traumatic Stress Disorder: No problems reported or observed.  Delirium: No problems reported or observed.    Past Psychiatric History/Meds/Treatments  Past Psychiatric History: schizoaffective disorder, bipolar type vs. Bipolar I disorder with mood-congruent features, insomnia, SANDEEP  Past Psychiatric Meds/Treatments: see med list  Past Violence/Victimization History: denies    Current Mental Health Contacts  Provider Name/Phone Number: SCOTT shanks [phone]  Provider Last Appointment Date: 9/16/2024    Support System: Immediate family    Living Arrangement: Homeless    Home Safety  Feels Safe Living in Home: No  Home Safety : patient is currently homeless    Income Information  Employment Status for: Patient  Employment Status: Disabled  Income Source: Disability    Stage I Diagnostics Service/Education History  Current or Previous  Service: None  Education Level: Less than high school, GED  History of Learning Problems: No  History of School Behavior Problems: No  School History: patient attended  up to the 11th grade has her GED    Social/Cultural History    Social History:   Patient was born and raised in [place] by both parents, patient has 5 sisters and 7 brothers. Patient's parents were active alcoholics. Home life difficult. Patient moved to [place] when she was 18. Patient was ,  in 2013, has 3 adult sons. patient has been residing with her sister for the past 2 years, homeless  today as sister will no longer permit patient to live with her. Patient could not identify hobbies or interests.  Important Activities: Family    Legal  Legal Considerations: Patient/ Family Ability to Make Healthcare Decisions  Legal Concerns: none    Drug Screening  Have you used any substances (canabis, cocaine, heroin, hallucinogens, inhalants, etc.) in the past 12 months?: No  Have you used any prescription drugs other than prescribed in the past 12 months?: No  Is a toxicology screen needed?: Yes         Behavioral Health  Behavioral Health(WDL): Exceptions to WDL  Behaviors/Mood: Cooperative, Fearful, Flat affect, Tearful  Affect: Appropriate to circumstances  Emotional Support Given: Reassure    Orientation  Orientation Level: Oriented X4    General Appearance  Motor Activity: Tics  Speech Pattern: Pressured  General Attitude: Cooperative  Appearance/Hygiene: Unremarkable    Thought Process  Coherency: Other (Comment) (unremarkable)  Content: Blaming self  Delusions: Other (Comment) (none noted or observed)  Perception: Unable to assess  Hallucination: None  Judgment/Insight: Impaired  Confusion: None  Cognition: Impulsive    Sleep Pattern  Sleep Pattern: Disturbed/interrupted sleep    Risk Factors  Self Harm/Suicidal Ideation Plan: yes  Previous Self Harm/Suicidal Plans: yes  Risk Factors: Major mental illness    Violence Risk Assessment  Assessment of Violence: None noted  Thoughts of Harm to Others: No    Ability to Assess Risk Screen  Risk Screen - Ability to Assess: Able to be screened, Other (Comment)  Ask Suicide-Screening Questions  1. In the past few weeks, have you wished you were dead?: No  2. In the past few weeks, have you felt that you or your family would be better off if you were dead?: No  3. In the past week, have you been having thoughts about killing yourself?: Yes  4. Have you ever tried to kill yourself?: Yes  How did you try to kill yourself?: OD on tylenol  When did you try to kill  yourself?: 9/19/2024  5. Are you having thoughts of killing yourself right now?: Yes  Calculated Risk Score: Imminent Risk  Taylor Suicide Severity Rating Scale (Screener/Recent Self-Report)  1. Wish to be Dead (Past 1 Month): Yes  2. Non-Specific Active Suicidal Thoughts (Past 1 Month): Yes  3. Active Suicidal Ideation with any Methods (Not Plan) Without Intent to Act (Past 1 Month): Yes  4. Active Suicidal Ideation with Some Intent to Act, Without Specific Plan (Past 1 Month): Yes  5. Active Suicidal Ideation with Specific Plan and Intent (Past 1 Month): Yes  6. Suicidal Behavior (Lifetime): Yes  6. Suicidal Behavior (3 Months): Yes  Calculated C-SSRS Risk Score (Lifetime/Recent): High Risk  Step 1: Risk Factors  Current & Past Psychiatric Dx: Mood disorder, Psychotic disorder  Presenting Symptoms: Impulsivity, Hopelessness or despair, Anxiety and/or panic, Insomia  Family History:  (parent were alcoholic)  Precipitants/Stressors: Inadequate social supports  Access to Lethal Methods : No  Step 2: Protective Factors   Protective Factors Internal: Fear of death or the actual act of killing self  Protective Factors External: Positive therapeutic relationships  Step 3: Suicidal Ideation Intensity  How Many Times Have You Had These Thoughts: Less than once a week  When You Have the Thoughts How Long do They Last : Fleeting - few seconds or minutes  Could/Can You Stop Thinking About Killing Yourself or Wanting to Die if You Want to: Can control thoughts with little difficulty  Are There Things - Anyone or Anything - That Stopped You From Wanting to Die or Acting on: Deterrents definitely stopped you from attempting suicide  What Sort of Reasons Did You Have For Thinking About Wanting to Die or Killing Yourself: Equally to get attention, revenge or a reaction from others and to end/stop the pain  Total Score: 8  Step 5: Documentation  Risk Level: Moderate suicide risk    Psychiatric Impression and Plan of  "Care    Assessment and Plan:   Patient is a 58 year old female, admitted to ED for SI. Patient has a history of schizoaffective disorder, bipolar type vs Bipolar I with mood-congruent psychotic features, insomnia, SANDEEP, and seizure disorder. Patient was admitted to [facility] from 9/19/2024 until today 9/27/2024 for suicide attempt OD on tylenol. Patient was stabilized, discharged today. Patient returned to her sister's home where she has been residing for the past 2 years. Sister had patient's belongings packed up, patient is now homeless. Patient became overwhelmed with emotions, endorsing SI, \"I have nothing to live for no one wants me\", brought self back to ED. Patient identifies to feeling hopeless, helpless, worthless. Collateral information from son  Magdy, [phone], \"this is normal behavior for my mother (patient)\". Patient is a consumer at [employer] for  support in the community.  Patient endorsing SI, C-SSRS indicate patient is at moderate risk of self harm, denies HI,AH,VH. Discussed plan of care and recommendations with Dr Davis ALEJANDRA. Based on patient's most recent suicide attempt on 9/19/2024, lack of support in community, homeless, patient is in need of HLOC at this time.      schizoaffective disorder, bipolar type vs Bipolar I with mood-congruent psychotic features,  Insomnia  SANDEEP      Outcome/Disposition  Assessment, Recommendations and Risk Level Reviewed with: Dr Davis ALEJANDRA  EPAT Assessment Completed Date: 09/28/24  EPAT Assessment Completed Time: 0114      "

## 2024-09-28 NOTE — ED PROVIDER NOTES
HPI   Chief Complaint   Patient presents with    Suicidal     Patient just spent over a week at [facility] for Suicidal Ideation. Patient was discharge 2 hours ago and stated that her Sister kicked her out of her house and she has no where to go. All of her belongings locked up.       58-year-old female who presents to the emergency department by walking complaining of suicidal ideation.  Patient just discharged from [place] for suicide attempt.  States that she got to her sister's house by [employer].  They had a discussion and her sister had her stuff packed and kicked her out.  Patient states that she wants to die.  Wants to kill herself but denies a plan.  Denies any hallucinations.  Does not have where to go.  She lives in [place].              Patient History   Past Medical History:   Diagnosis Date    Anxiety     COPD (chronic obstructive pulmonary disease) (Multi)     Diverticulosis     GERD (gastroesophageal reflux disease)     HLD (hyperlipidemia)     Hypertension     Hypothyroidism     Insomnia     Migraine     Osteoarthritis     Other specified anxiety disorders 01/03/2022    Depression with anxiety    Schizoaffective disorder (Multi)     Seizure (Multi)      Past Surgical History:   Procedure Laterality Date    ENDOMETRIAL ABLATION      FOOT SURGERY  10/13/2014    Foot Surgery    FOOT SURGERY       Family History   Problem Relation Name Age of Onset    Diabetes Mother      Emphysema Mother      Emphysema Father       Social History     Tobacco Use    Smoking status: Every Day     Current packs/day: 0.50     Types: Cigarettes     Passive exposure: Never    Smokeless tobacco: Never   Vaping Use    Vaping status: Never Used   Substance Use Topics    Alcohol use: Never    Drug use: Never       Physical Exam   ED Triage Vitals [09/27/24 2004]   Temperature Heart Rate Respirations BP   36.9 °C (98.5 °F) 100 18 172/89      Pulse Ox Temp Source Heart Rate Source Patient Position   97 % Oral -- Lying      BP Location  FiO2 (%)     Right arm --       Physical Exam  Constitutional:       Appearance: Normal appearance.   HENT:      Head: Normocephalic and atraumatic.      Mouth/Throat:      Mouth: Mucous membranes are moist.   Cardiovascular:      Rate and Rhythm: Normal rate and regular rhythm.   Pulmonary:      Effort: Pulmonary effort is normal.      Breath sounds: Normal breath sounds.   Abdominal:      Palpations: Abdomen is soft.      Tenderness: There is no abdominal tenderness.   Musculoskeletal:         General: Normal range of motion.   Skin:     General: Skin is warm and dry.   Neurological:      General: No focal deficit present.      Mental Status: She is alert and oriented to person, place, and time.   Psychiatric:      Comments: Patient is tearful, calm and cooperative.  Verbalizes suicidal thoughts.  Appears depressed.           ED Course & MDM   ED Course as of 09/28/24 0219   Fri Sep 27, 2024   2028 EKG which I reviewed and independently interpreted done at 2022 reveals a normal sinus rhythm at a rate of 94 with right atrial enlargement, RSR prime in V1 and V2 suggestive of incomplete right bundle branch block, normal axis, normal QT QTc, no STEMI. [RM]      ED Course User Index  [RM] Dennis Cannon MD         Diagnoses as of 09/28/24 0219   Depression with suicidal ideation                 No data recorded     Austin Coma Scale Score: 15 (09/27/24 2013 : Alexandria Zeng RN)                     Labs Reviewed   CBC WITH AUTO DIFFERENTIAL - Abnormal       Result Value    WBC 12.1 (*)     nRBC 0.0      RBC 4.03      Hemoglobin 13.0      Hematocrit 38.8      MCV 96      MCH 32.3      MCHC 33.5      RDW 13.2      Platelets 355      Neutrophils % 73.6      Immature Granulocytes %, Automated 0.3      Lymphocytes % 16.7      Monocytes % 7.0      Eosinophils % 1.8      Basophils % 0.6      Neutrophils Absolute 8.87 (*)     Immature Granulocytes Absolute, Automated 0.04      Lymphocytes Absolute 2.02      Monocytes  Absolute 0.84      Eosinophils Absolute 0.22      Basophils Absolute 0.07     COMPREHENSIVE METABOLIC PANEL - Abnormal    Glucose 115 (*)     Sodium 130 (*)     Potassium 3.9      Chloride 96 (*)     Bicarbonate 21      Anion Gap 17      Urea Nitrogen 13      Creatinine 0.78      eGFR 89      Calcium 10.4 (*)     Albumin 5.1 (*)     Alkaline Phosphatase 71      Total Protein 8.2      AST 18      Bilirubin, Total 0.4      ALT 18     URINALYSIS WITH REFLEX CULTURE AND MICROSCOPIC - Abnormal    Color, Urine Colorless (*)     Appearance, Urine Clear      Specific Gravity, Urine 1.003 (*)     pH, Urine 6.0      Protein, Urine NEGATIVE      Glucose, Urine Normal      Blood, Urine NEGATIVE      Ketones, Urine NEGATIVE      Bilirubin, Urine NEGATIVE      Urobilinogen, Urine Normal      Nitrite, Urine NEGATIVE      Leukocyte Esterase, Urine NEGATIVE     DRUG SCREEN,URINE - Normal    Amphetamine Screen, Urine Presumptive Negative      Barbiturate Screen, Urine Presumptive Negative      Benzodiazepines Screen, Urine Presumptive Negative      Cannabinoid Screen, Urine Presumptive Negative      Cocaine Metabolite Screen, Urine Presumptive Negative      Fentanyl Screen, Urine Presumptive Negative      Opiate Screen, Urine Presumptive Negative      Oxycodone Screen, Urine Presumptive Negative      PCP Screen, Urine Presumptive Negative      Methadone Screen, Urine Presumptive Negative      Narrative:     Drug screen results are presumptive and should not be used to assess   compliance with prescribed medication. Contact the performing [facility] laboratory   to add-on definitive confirmatory testing if clinically indicated.    Toxicology screening results are reported qualitatively. The concentration must   be greater than or equal to the cutoff to be reported as positive. The concentration   at which the screening test can detect an individual drug or metabolite varies.   The absence of expected drug(s) and/or drug metabolite(s) may  indicate non-compliance,   inappropriate timing of specimen collection relative to drug administration, poor drug   absorption, diluted/adulterated urine, or limitations of testing. For medical purposes   only; not valid for forensic use.    Interpretive questions should be directed to the laboratory medical directors.   ACETAMINOPHEN - Normal    Acetaminophen <10.0     SALICYLATE - Normal    Salicylate  <3     SARS-COV-2 PCR - Normal    Coronavirus 2019, PCR Not Detected      Narrative:     This assay has received FDA Emergency Use Authorization (EUA) and is only authorized for the duration of time that circumstances exist to justify the authorization of the emergency use of in vitro diagnostic tests for the detection of SARS-CoV-2 virus and/or diagnosis of COVID-19 infection under section 564(b)(1) of the Act, 21 U.S.C. 360bbb-3(b)(1). This assay is an in vitro diagnostic nucleic acid amplification test for the qualitative detection of SARS-CoV-2 from nasopharyngeal specimens and has been validated for use at [facility]. Negative results do not preclude COVID-19 infections and should not be used as the sole basis for diagnosis, treatment, or other management decisions.     URINE CULTURE   URINALYSIS WITH REFLEX CULTURE AND MICROSCOPIC    Narrative:     The following orders were created for panel order Urinalysis with Reflex Culture and Microscopic.  Procedure                               Abnormality         Status                     ---------                               -----------         ------                     Urinalysis with Reflex C...[[id number]]  Abnormal            Final result               Extra Urine Gray Tube[[id number]]                                                         Please view results for these tests on the individual orders.   EXTRA URINE GRAY TUBE           Medical Decision Making  58-year-old female who presents to the emergency department complaining of  suicidal thoughts.  Walked into the emergency department after getting kicked out from her sister's house just being discharged from the hospital after Tylenol ingestion.  Blood work show I reviewed and independently interpreted reveals clean urine, negative drug screen, normal electrolytes, normal white count, negative salicylate and aspirin levels and negative COVID.  Patient is medically cleared.  Patient has been evaluated by EPAT.  She is considered to be high risk due to her homelessness and recent suicide attempt.  Recommendation for admission.  Pending placement.  Patient has been evaluated by EPAT and accepted in transfer to [facility] by Dr. Duran        Procedure  Procedures     Dennis Cannon MD  09/28/24 0996

## 2024-09-28 NOTE — ED NOTES
Pt made aware of dispo to [facility] & agreeable.  Bed Locked Low position call light in reach      Richa Saldana RN  09/28/24 2194

## 2024-09-28 NOTE — SIGNIFICANT EVENT
Application for Emergency Admission      Ready for Transfer?  Is the patient medically cleared for transfer to inpatient psychiatry: Yes  Has the patient been accepted to an inpatient psychiatric hospital: Yes    Application for Emergency Admission  IN ACCORDANCE WITH [place]  The Chief Clinical Officer of: Pritesh Tripp 9/28/2024 .2:14 AM    Reason for Hospitalization  The undersigned has reason to believe that: Erica Machado Is a mentally ill person subject to hospitalization by court order under division B Section 5122.01 of the Revised Code, i.e., this person:    1.Yes  Represents a substantial risk of physical harm to self as manifested by evidence of threats of, or attempts at, suicide or serious self-inflicted bodily harm    2.No Represents a substantial risk of physical harm to others as manifested by evidence of recent homicidal or other violent behavior, evidence of recent threats that place another in reasonable fear of violent behavior and serious physical harm, or other evidence of present dangerousness    3.Yes Represents a substantial and immediate risk of serious physical impairment or injury to self as manifested by  evidence that the person is unable to provide for and is not providing for the person's basic physical needs because of the person's mental illness and that appropriate provision for those needs cannot be made  immediately available in the community    4.Yes Would benefit from treatment in a hospital for his mental illness and is in need of such treatment as manifested by evidence of behavior that creates a grave and imminent risk to substantial rights of others or  himself.    5.No Would benefit from treatment as manifested by evidence of behavior that indicates all of the following:       (a) The person is unlikely to survive safely in the community without supervision, based on a clinical determination.       (b) The person has a history of lack of  compliance with treatment for mental illness and one of the following applies:      (i) At least twice within the thirty-six months prior to the filing of an affidavit seeking court-ordered treatment of the person under section 5122.111 of the Revised Code, the lack of compliance has been a significant factor in necessitating hospitalization in a hospital or receipt of services in a forensic or other mental health unit of a correctional facility, provided that the thirty-six-month period shall be extended by the length of any hospitalization or incarceration of the person that occurred within the thirty-six-month period.      (ii) Within the forty-eight months prior to the filing of an affidavit seeking court-ordered treatment of the person under section 5122.111 of the Revised Code, the lack of compliance resulted in one or more acts of serious violent behavior toward self or others or threats of, or attempts at, serious physical harm to self or others, provided that the forty-eight-month period shall be extended by the length of any hospitalization or incarceration of the person that occurred within the forty-eight-month period.      (c) The person, as a result of mental illness, is unlikely to voluntarily participate in necessary treatment.       (d) In view of the person's treatment history and current behavior, the person is in need of treatment in order to prevent a relapse or deterioration that would be likely to result in substantial risk of serious harm to the person or others.    (e) Represents a substantial risk of physical harm to self or others if allowed to remain at liberty pending examination.    Therefore, it is requested that said person be admitted to the above named facility.    STATEMENT OF BELIEF    Must be filled out by one of the following: a psychiatrist, licensed physician, licensed clinical psychologist, health or ,  or .  (Statement shall include the  circumstances under which the individual was taken into custody and the reason for the person's belief that hospitalization is necessary. The statement shall also include a reference to efforts made to secure the individual's property at his residence if he was taken into custody there. Every reasonable and appropriate effort should be made to take this person into custody in the least conspicuous manner possible.)    The patient was just discharged from the hospital after admission for intentional drug ingestion with intent to hurt self.  Presents very depressed, hopeless and with suicidal ideation.    Dennis Cannon MD 9/28/2024     _____________________________________________________________   Place of Employment: [facility]    STATEMENT OF OBSERVATION BY PSYCHIATRIST, LICENSED PHYSICIAN, OR LICENSED CLINICAL PSYCHOLOGIST, IF APPLICABLE    Place of Observation (e.g., [facility] mental health center, general hospital, office, emergency facility)  (If applicable, please complete)    Dennis Cannon MD 9/28/2024    _____________________________________________________________

## 2024-09-28 NOTE — ED TRIAGE NOTES
Patient discharge from [facility] today after being there over a week for tylenol overdose and suicidal ideation. Patient discharge about 2 hours ago and dropped off at her sisters house by an Uber and her sister stated that she could not stay there, which caused the patient to become suicidal and she walked to the ED to get help.

## 2024-09-28 NOTE — PROGRESS NOTES
Emergency Medicine Transition of Care Note.    I received Erica Machado in signout from Dr. HAYLEE Cannon.  Please see the previous ED provider note for all HPI, PE and MDM up to the time of signout at 0700. This is in addition to the primary record.    In brief Erica Machado is an 58 y.o. female presenting for   Chief Complaint   Patient presents with    Suicidal     Patient just spent over a week at [facility] for Suicidal Ideation. Patient was discharge 2 hours ago and stated that her Sister kicked her out of her house and she has no where to go. All of her belongings locked up.     At the time of signout we were awaiting: transfer to psychiatric facility.     58-year-old female who presents to the emergency department complaining of suicidal thoughts.  Walked into the emergency department after getting kicked out from her sister's house just being discharged from the hospital after Tylenol ingestion.  Blood work show I reviewed and independently interpreted reveals clean urine, negative drug screen, normal electrolytes, normal white count, negative salicylate and aspirin levels and negative COVID.  Patient is medically cleared.  Patient has been evaluated by EPAT.  She is considered to be high risk due to her homelessness and recent suicide attempt.  Recommendation for admission.  Pending placement.  Patient has been evaluated by EPAT and accepted in transfer to [facility] by Dr. Duran      ED Course as of 09/28/24 0756   Fri Sep 27, 2024   2028 EKG which I reviewed and independently interpreted done at 2022 reveals a normal sinus rhythm at a rate of 94 with right atrial enlargement, RSR prime in V1 and V2 suggestive of incomplete right bundle branch block, normal axis, normal QT QTc, no STEMI. [RM]      ED Course User Index  [RM] Dennis Cannon MD         Diagnoses as of 09/28/24 0756   Depression with suicidal ideation       Medical Decision Making  Patient cooperative in no acute  distress.  Scheduled for transfer later this morning.  We will continue to monitor closely.        Final diagnoses:   [F32.A, R42.336] Depression with suicidal ideation           Procedure  Procedures    Ronaldo Garcia DO

## 2024-09-28 NOTE — ED NOTES
Behavior health protocol initiated, Pt made aware of POC for medical clearance and reasoning given opportunity for questions. Pt verbalized understanding and is agreeable to POC. Room in compliance with  safety protocol. Placed in gown, belongings inventoried labeled placed in bag & removed from room securely locked     Richa Saldana RN  09/28/24 1159

## 2024-09-29 LAB
10OH-CARBAZEPINE SERPL-MCNC: <1 UG/ML (ref 3–35)
BACTERIA UR CULT: NO GROWTH

## 2024-10-01 LAB
ATRIAL RATE: 94 BPM
P AXIS: 62 DEGREES
P OFFSET: 195 MS
P ONSET: 125 MS
PR INTERVAL: 200 MS
Q ONSET: 225 MS
QRS COUNT: 15 BEATS
QRS DURATION: 102 MS
QT INTERVAL: 364 MS
QTC CALCULATION(BAZETT): 455 MS
QTC FREDERICIA: 422 MS
R AXIS: 37 DEGREES
T AXIS: 58 DEGREES
T OFFSET: 407 MS
VENTRICULAR RATE: 94 BPM

## 2024-10-02 PROCEDURE — RXMED WILLOW AMBULATORY MEDICATION CHARGE

## 2024-10-03 ENCOUNTER — ANESTHESIA (OUTPATIENT)
Dept: GASTROENTEROLOGY | Facility: HOSPITAL | Age: 59
End: 2024-10-03

## 2024-10-08 ENCOUNTER — PHARMACY VISIT (OUTPATIENT)
Dept: PHARMACY | Facility: CLINIC | Age: 59
End: 2024-10-08
Payer: MEDICAID

## 2024-10-08 DIAGNOSIS — R56.9 SEIZURE (MULTI): ICD-10-CM

## 2024-10-16 PROCEDURE — RXMED WILLOW AMBULATORY MEDICATION CHARGE

## 2024-10-16 RX ORDER — LAMOTRIGINE 25 MG/1
25 TABLET ORAL DAILY
Qty: 9 TABLET | Refills: 0 | Status: SHIPPED | OUTPATIENT
Start: 2024-10-16 | End: 2024-10-26

## 2024-10-17 ENCOUNTER — PHARMACY VISIT (OUTPATIENT)
Dept: PHARMACY | Facility: CLINIC | Age: 59
End: 2024-10-17
Payer: MEDICAID

## 2024-10-17 PROCEDURE — RXMED WILLOW AMBULATORY MEDICATION CHARGE

## 2024-10-17 RX ORDER — ZOLPIDEM TARTRATE 10 MG/1
10 TABLET ORAL NIGHTLY PRN
Qty: 30 TABLET | Refills: 2 | OUTPATIENT
Start: 2024-10-17

## 2024-10-21 PROCEDURE — RXMED WILLOW AMBULATORY MEDICATION CHARGE

## 2024-10-22 ENCOUNTER — PHARMACY VISIT (OUTPATIENT)
Dept: PHARMACY | Facility: CLINIC | Age: 59
End: 2024-10-22
Payer: MEDICAID

## 2024-10-22 PROCEDURE — RXMED WILLOW AMBULATORY MEDICATION CHARGE

## 2024-10-22 RX ORDER — HYDROXYZINE HYDROCHLORIDE 10 MG/1
10 TABLET, FILM COATED ORAL 3 TIMES DAILY PRN
Qty: 30 TABLET | Refills: 5 | OUTPATIENT
Start: 2024-10-22

## 2024-10-23 PROCEDURE — RXMED WILLOW AMBULATORY MEDICATION CHARGE

## 2024-10-24 ENCOUNTER — PHARMACY VISIT (OUTPATIENT)
Dept: PHARMACY | Facility: CLINIC | Age: 59
End: 2024-10-24
Payer: MEDICAID

## 2024-10-24 PROCEDURE — RXMED WILLOW AMBULATORY MEDICATION CHARGE

## 2024-10-24 RX ORDER — BUTALBITAL, ACETAMINOPHEN AND CAFFEINE 50; 325; 40 MG/1; MG/1; MG/1
TABLET ORAL
Qty: 24 TABLET | Refills: 5 | OUTPATIENT
Start: 2024-10-24

## 2024-10-25 ENCOUNTER — PHARMACY VISIT (OUTPATIENT)
Dept: PHARMACY | Facility: CLINIC | Age: 59
End: 2024-10-25
Payer: MEDICAID

## 2024-10-25 PROCEDURE — RXMED WILLOW AMBULATORY MEDICATION CHARGE

## 2024-10-25 RX ORDER — SUMATRIPTAN 50 MG/1
TABLET, FILM COATED ORAL
Qty: 30 TABLET | Refills: 1 | OUTPATIENT
Start: 2024-10-25

## 2024-10-25 RX ORDER — LEVOFLOXACIN 500 MG/1
500 TABLET, FILM COATED ORAL DAILY
Qty: 7 TABLET | Refills: 0 | OUTPATIENT
Start: 2024-10-25

## 2024-10-25 RX ORDER — DULOXETIN HYDROCHLORIDE 30 MG/1
30 CAPSULE, DELAYED RELEASE ORAL DAILY
Qty: 30 CAPSULE | Refills: 0 | OUTPATIENT
Start: 2024-10-25

## 2024-10-25 RX ORDER — MECLIZINE HCL 12.5 MG 12.5 MG/1
TABLET ORAL
Qty: 30 TABLET | Refills: 0 | OUTPATIENT
Start: 2024-10-25

## 2024-10-25 RX ORDER — LOPERAMIDE HYDROCHLORIDE 2 MG/1
CAPSULE ORAL
Qty: 20 CAPSULE | Refills: 1 | OUTPATIENT
Start: 2024-10-25

## 2024-10-25 RX ORDER — NEOMYCIN SULFATE, POLYMYXIN B SULFATE, HYDROCORTISONE 3.5; 10000; 1 MG/ML; [USP'U]/ML; MG/ML
SOLUTION/ DROPS AURICULAR (OTIC)
Qty: 10 ML | Refills: 0 | OUTPATIENT
Start: 2024-10-25

## 2024-10-28 DIAGNOSIS — I15.9 SECONDARY HYPERTENSION: ICD-10-CM

## 2024-10-28 RX ORDER — AMLODIPINE BESYLATE 5 MG/1
5 TABLET ORAL DAILY
Qty: 30 TABLET | Refills: 0 | Status: CANCELLED | OUTPATIENT
Start: 2024-10-28 | End: 2024-11-27

## 2024-10-29 DIAGNOSIS — I15.9 SECONDARY HYPERTENSION: ICD-10-CM

## 2024-10-30 RX ORDER — AMLODIPINE BESYLATE 5 MG/1
5 TABLET ORAL DAILY
Qty: 30 TABLET | Refills: 0 | Status: SHIPPED | OUTPATIENT
Start: 2024-10-30 | End: 2024-11-29

## 2024-11-01 PROCEDURE — RXMED WILLOW AMBULATORY MEDICATION CHARGE

## 2024-11-04 PROCEDURE — RXMED WILLOW AMBULATORY MEDICATION CHARGE

## 2024-11-05 ENCOUNTER — PHARMACY VISIT (OUTPATIENT)
Dept: PHARMACY | Facility: CLINIC | Age: 59
End: 2024-11-05
Payer: MEDICAID

## 2024-11-05 PROCEDURE — RXMED WILLOW AMBULATORY MEDICATION CHARGE

## 2024-11-07 ENCOUNTER — PHARMACY VISIT (OUTPATIENT)
Dept: PHARMACY | Facility: CLINIC | Age: 59
End: 2024-11-07
Payer: MEDICAID

## 2024-11-07 PROCEDURE — RXMED WILLOW AMBULATORY MEDICATION CHARGE

## 2024-11-11 PROCEDURE — RXMED WILLOW AMBULATORY MEDICATION CHARGE

## 2024-11-12 ENCOUNTER — PHARMACY VISIT (OUTPATIENT)
Dept: PHARMACY | Facility: CLINIC | Age: 59
End: 2024-11-12
Payer: MEDICAID

## 2024-11-12 PROCEDURE — RXMED WILLOW AMBULATORY MEDICATION CHARGE

## 2024-11-12 RX ORDER — VILAZODONE HYDROCHLORIDE 40 MG/1
40 TABLET ORAL NIGHTLY
Qty: 30 TABLET | Refills: 5 | OUTPATIENT
Start: 2024-11-12

## 2024-11-14 ENCOUNTER — PHARMACY VISIT (OUTPATIENT)
Dept: PHARMACY | Facility: CLINIC | Age: 59
End: 2024-11-14
Payer: MEDICAID

## 2024-11-14 PROCEDURE — RXMED WILLOW AMBULATORY MEDICATION CHARGE

## 2024-11-14 RX ORDER — LAMOTRIGINE 25 MG/1
TABLET ORAL DAILY
Qty: 90 TABLET | Refills: 0 | OUTPATIENT
Start: 2024-11-14

## 2024-11-15 ENCOUNTER — PHARMACY VISIT (OUTPATIENT)
Dept: PHARMACY | Facility: CLINIC | Age: 59
End: 2024-11-15
Payer: MEDICAID

## 2024-11-15 PROCEDURE — RXMED WILLOW AMBULATORY MEDICATION CHARGE

## 2024-11-15 RX ORDER — LOPERAMIDE HYDROCHLORIDE 2 MG/1
CAPSULE ORAL
Qty: 20 CAPSULE | Refills: 1 | OUTPATIENT
Start: 2024-11-15

## 2024-11-15 RX ORDER — DULOXETIN HYDROCHLORIDE 30 MG/1
30 CAPSULE, DELAYED RELEASE ORAL DAILY
Qty: 30 CAPSULE | Refills: 0 | OUTPATIENT
Start: 2024-11-15

## 2024-11-15 RX ORDER — DOXYCYCLINE 100 MG/1
100 CAPSULE ORAL 2 TIMES DAILY
Qty: 20 CAPSULE | Refills: 0 | OUTPATIENT
Start: 2024-11-14

## 2024-11-19 ENCOUNTER — PHARMACY VISIT (OUTPATIENT)
Dept: PHARMACY | Facility: CLINIC | Age: 59
End: 2024-11-19
Payer: MEDICAID

## 2024-11-19 PROCEDURE — RXMED WILLOW AMBULATORY MEDICATION CHARGE

## 2024-11-21 ENCOUNTER — HOSPITAL ENCOUNTER (EMERGENCY)
Facility: HOSPITAL | Age: 59
Discharge: HOME | End: 2024-11-21
Payer: MEDICAID

## 2024-11-21 ENCOUNTER — APPOINTMENT (OUTPATIENT)
Dept: RADIOLOGY | Facility: HOSPITAL | Age: 59
End: 2024-11-21
Payer: MEDICAID

## 2024-11-21 VITALS
WEIGHT: 196 LBS | SYSTOLIC BLOOD PRESSURE: 125 MMHG | OXYGEN SATURATION: 96 % | RESPIRATION RATE: 18 BRPM | TEMPERATURE: 98.3 F | HEIGHT: 68 IN | DIASTOLIC BLOOD PRESSURE: 81 MMHG | HEART RATE: 95 BPM | BODY MASS INDEX: 29.7 KG/M2

## 2024-11-21 DIAGNOSIS — M25.571 ACUTE RIGHT ANKLE PAIN: Primary | ICD-10-CM

## 2024-11-21 DIAGNOSIS — S82.839A AVULSION FRACTURE OF DISTAL FIBULA: ICD-10-CM

## 2024-11-21 PROCEDURE — 73630 X-RAY EXAM OF FOOT: CPT | Mod: RIGHT SIDE | Performed by: RADIOLOGY

## 2024-11-21 PROCEDURE — 2500000004 HC RX 250 GENERAL PHARMACY W/ HCPCS (ALT 636 FOR OP/ED): Mod: SE

## 2024-11-21 PROCEDURE — 29515 APPLICATION SHORT LEG SPLINT: CPT | Mod: RT

## 2024-11-21 PROCEDURE — 73630 X-RAY EXAM OF FOOT: CPT | Mod: RT

## 2024-11-21 PROCEDURE — 73610 X-RAY EXAM OF ANKLE: CPT | Mod: RIGHT SIDE | Performed by: RADIOLOGY

## 2024-11-21 PROCEDURE — 73610 X-RAY EXAM OF ANKLE: CPT | Mod: RT

## 2024-11-21 PROCEDURE — 99284 EMERGENCY DEPT VISIT MOD MDM: CPT | Mod: 25

## 2024-11-21 PROCEDURE — 96372 THER/PROPH/DIAG INJ SC/IM: CPT

## 2024-11-21 RX ORDER — KETOROLAC TROMETHAMINE 15 MG/ML
15 INJECTION, SOLUTION INTRAMUSCULAR; INTRAVENOUS ONCE
Status: COMPLETED | OUTPATIENT
Start: 2024-11-21 | End: 2024-11-21

## 2024-11-21 RX ORDER — NAPROXEN 500 MG/1
500 TABLET ORAL
Qty: 30 TABLET | Refills: 0 | Status: SHIPPED | OUTPATIENT
Start: 2024-11-21 | End: 2024-12-06

## 2024-11-21 ASSESSMENT — COLUMBIA-SUICIDE SEVERITY RATING SCALE - C-SSRS
1. IN THE PAST MONTH, HAVE YOU WISHED YOU WERE DEAD OR WISHED YOU COULD GO TO SLEEP AND NOT WAKE UP?: NO
2. HAVE YOU ACTUALLY HAD ANY THOUGHTS OF KILLING YOURSELF?: NO
6. HAVE YOU EVER DONE ANYTHING, STARTED TO DO ANYTHING, OR PREPARED TO DO ANYTHING TO END YOUR LIFE?: NO

## 2024-11-21 ASSESSMENT — PAIN DESCRIPTION - ORIENTATION: ORIENTATION: RIGHT

## 2024-11-21 ASSESSMENT — PAIN - FUNCTIONAL ASSESSMENT: PAIN_FUNCTIONAL_ASSESSMENT: 0-10

## 2024-11-21 ASSESSMENT — PAIN DESCRIPTION - PAIN TYPE: TYPE: ACUTE PAIN

## 2024-11-21 ASSESSMENT — PAIN DESCRIPTION - LOCATION: LOCATION: FOOT

## 2024-11-21 ASSESSMENT — PAIN SCALES - GENERAL
PAINLEVEL_OUTOF10: 6
PAINLEVEL_OUTOF10: 10 - WORST POSSIBLE PAIN

## 2024-11-21 NOTE — ED PROVIDER NOTES
Limitations to history: None  Independent Historians: Family  External Records Reviewed: HIE, OARRS, outpatient notes, inpatient notes, paper charts if needed    History of Present Illness:  Patient is a 58-year-old female past medical history of anxiety, COPD, GERD, hypertension, hypothyroidism, schizoaffective disorder presents to ED chief complaint of right ankle pain from a fall.  Patient denies head injury, denies loss of consciousness, denies use of blood thinners or any other injury and/or trauma.  Patient is brought into ED via EMS, patient reports he is not ambulatory due to the pain.  Patient is alert and orient x 3 upon examination otherwise no acute distress.      Denies HA, C/P, SOB, ABD pain, Nausea, Vomiting, Diarrhea, Weakness, Dizziness, Fever, Chills.    PMFSH:   As per HPI, otherwise nurses notes reviewed in EMR    Physical Exam:  Appearance: Alert, oriented x3, supine on exam table with head elevated, cooperative, in no acute distress. Well nourished & well hydrated.      Skin: Intact, dry skin, no lesions, rash, petechiae or purpura.     Eyes: PERRLA, EOMs intact, Conjunctiva pink with no redness or exudates. No scleral icterus.     Ears: Hearing grossly intact.      Nose: Nares patent, no epistaxis.     Mouth: Dentition without concerning abnormalities. no obstruction of posterior pharynx.     Neck: Supple, without meningismus. Trachea at midline.     Pulmonary: Clear bilaterally with good chest wall excursion. No rales, rhonchi or wheezing. No accessory muscle use or stridor. Talking in full sentences.     Cardiac: Normal S1, S2 without murmur, rub, gallop or extrasystole.     Abdomen: Soft, nontender to light and deep palpation to all quadrants, normoactive bowel sounds.  No palpable organomegaly.  No rebound or guarding.     Genitourinary: Physical exam deferred.     Musculoskeletal: Mild pain on palpation most notable near the medial and lateral right ankle joint.  There is a moderate  amount of swelling.  Distal pulses are present equal bilaterally.  Rest of the exam reveals no pain on palpation, instability, or deformity. Pulses full and equal. No cyanosis or clubbing. capillary refill <2 seconds to all examined digits.     Neurological:  Cranial nerves II through XII are grossly intact, normal sensation, no weakness, no focal findings identified.      Psychiatric: Appropriate mood and affect.    Labs Reviewed - No data to display   XR ankle right 3+ views   Final Result   1. Minimally displaced oblique fracture through the distal fibula and   possible minute avulsion fracture along the medial aspect of the   medial malleolus.   2. Hardware fusing tarsometatarsal joints dorsally; the proximal   screw through the navicular bone is fragmented.   3. Pes planus deformity and degenerative changes as above.        Signed by: Russ Chery 11/21/2024 4:02 PM   Dictation workstation:   [id number]      XR foot right 3+ views   Final Result   1. Minimally displaced oblique fracture through the distal fibula and   possible minute avulsion fracture along the medial aspect of the   medial malleolus.   2. Hardware fusing tarsometatarsal joints dorsally; the proximal   screw through the navicular bone is fragmented.   3. Pes planus deformity and degenerative changes as above.        Signed by: Russ Chery 11/21/2024 4:02 PM   Dictation workstation:   [id number]                   Repeat Evaluation below    Summary:  Medical Decision Making:   Patient presented as described in HPI. Patient case including ROS, PE, and treatment and plan discussed with ED attending if attached as cosigner. Due to patients presentation orders completed include as documented.  Patient evaluated for complaints of a fall, right ankle pain, right foot pain.  Patient is found to be afebrile, nontachycardic, nonhypoxic.  X-ray imaging revealed minimally displaced oblique fracture through the distal fibula and possible minute  avulsion fracture along the medial aspect of the medial malice, hardware fusing Teril metatarsal joints dorsally, the proximal screw through the navicular bone is fragmented.  Case findings discussed with Dr. Danish grier.  Dr. Danish grier recommends a posterior ankle splint, and close follow-up Monday at his [place] office.  Patient was given Toradol for pain.  Patient aware to continue taking anti-inflammatories.  Patient aware to follow-up with Dr. Danish Pleitez on Monday.  Splint was applied by nursing staff, MSPs present and intact pre and post splinting.  Patient will be given crutches by nursing staff.  Patient was advised to follow up with PCP or recommended provider in 2-3 days for another evaluation and exam. I advised patient/guardian to return or go to closest emergency room immediately if symptoms change, get worse, new symptoms develop prior to follow up. If there is no improvement in symptoms in the next 24 hours they are advised to return for further evaluation and exam. I also explained the plan and treatment course. Patient/guardian is in agreement with plan, treatment course, and follow up and states verbally that they will comply.    Tests/Medications/Escalations of Care considered but not given:    Patient care discussed with: N/A  Social Determinants affecting care: N/A    Final diagnosis and disposition as documented in impression    Homegoing. I discussed the differential; results and discharge plan with the patient and/or family/friend/caregiver if present.  I emphasized the importance of follow-up with the physician I referred them to in the timeframe recommended.  I explained reasons for the patient to return to the Emergency Department. They agreed that if they feel their condition is worsening or if they have any other concern they should call 911 immediately for further assistance. I gave the patient an opportunity to ask all questions they had and answered all of them accordingly. They  understand return precautions and discharge instructions. The patient and/or family/friend/caregiver expressed understanding verbally and that they would comply.       Disposition:  Discharge         This note has been transcribed using voice recognition and may contain grammatical errors, misplaced words, incorrect words, incorrect phrases or other errors.     Beverly Weaver, MIKKI-PAT  11/21/24 1720       Beverly Weaver, MIKKI-PAT  11/21/24 1724

## 2024-11-21 NOTE — ED NOTES
Patient to ed with complaints of right foot pain. Radhan states she fell     Maryann Bautista RN  11/21/24 6902

## 2024-11-27 ENCOUNTER — PHARMACY VISIT (OUTPATIENT)
Dept: PHARMACY | Facility: CLINIC | Age: 59
End: 2024-11-27
Payer: MEDICAID

## 2024-11-27 ENCOUNTER — OFFICE VISIT (OUTPATIENT)
Dept: ORTHOPEDIC SURGERY | Facility: CLINIC | Age: 59
End: 2024-11-27
Payer: MEDICAID

## 2024-11-27 VITALS — BODY MASS INDEX: 29.7 KG/M2 | WEIGHT: 196 LBS | HEIGHT: 68 IN

## 2024-11-27 DIAGNOSIS — S82.431A CLOSED DISPLACED OBLIQUE FRACTURE OF SHAFT OF RIGHT FIBULA, INITIAL ENCOUNTER: Primary | ICD-10-CM

## 2024-11-27 DIAGNOSIS — S82.51XA AVULSION FRACTURE OF MEDIAL MALLEOLUS OF RIGHT TIBIA, CLOSED, INITIAL ENCOUNTER: ICD-10-CM

## 2024-11-27 PROCEDURE — RXMED WILLOW AMBULATORY MEDICATION CHARGE

## 2024-11-27 PROCEDURE — 99203 OFFICE O/P NEW LOW 30 MIN: CPT

## 2024-11-27 PROCEDURE — 3008F BODY MASS INDEX DOCD: CPT

## 2024-11-27 RX ORDER — TRAMADOL HYDROCHLORIDE 50 MG/1
50 TABLET ORAL EVERY 8 HOURS PRN
Qty: 15 TABLET | Refills: 0 | Status: SHIPPED | OUTPATIENT
Start: 2024-11-27 | End: 2024-12-02

## 2024-11-27 ASSESSMENT — PAIN - FUNCTIONAL ASSESSMENT: PAIN_FUNCTIONAL_ASSESSMENT: 0-10

## 2024-11-27 ASSESSMENT — PAIN DESCRIPTION - DESCRIPTORS: DESCRIPTORS: SHARP;SHOOTING;ACHING

## 2024-11-27 ASSESSMENT — PAIN SCALES - GENERAL: PAINLEVEL_OUTOF10: 8

## 2024-11-27 NOTE — PROGRESS NOTES
HPI  Erica Machado is a 58 y.o. female, accompanied by her sister,  in office today for   Chief Complaint   Patient presents with    Right Ankle - Pain, Edema     11/22/24 patient states she fell down three steps and miss the last one, she states it went underneath her leg     .  she states she went to the ED at the time.  She was splinted but took the splint off that day because it was uncomfortable.  Arrived in a lace up brace, has crutches but not really using them.    Past Medical History: bipolar, history of 3 prior surgeries on the right ankle/foot from 2016/17    Medication  Current Outpatient Medications on File Prior to Visit   Medication Sig Dispense Refill    albuterol 108 (90 Base) MCG/ACT inhaler 2 puffs 2 times a day.      albuterol 90 mcg/actuation inhaler Inhale two puffs by mouth and into the lungs every 4 hours as directed 18 g 4    amLODIPine (Norvasc) 5 mg tablet Take 1 tablet (5 mg) by mouth once daily. 30 tablet 0    amLODIPine (Norvasc) 5 mg tablet Take 1 tablet (5 mg) by mouth once daily. 30 tablet 5    busPIRone (Buspar) 15 mg tablet Take 1 tablet (15 mg) by mouth 2 times a day.      busPIRone (Buspar) 15 mg tablet Take 1 tablet (15 mg) by mouth 2 times a day. 60 tablet 2    butalbital-acetaminophen-caff -40 mg tablet Take 1 tablet by mouth every 6 hours as needed 24 tablet 5    doxycycline (Vibramycin) 100 mg capsule Take 1 Capsule by mouth 2 (two) times daily for 10 days. Take with food 20 capsule 0    DULoxetine (Cymbalta) 30 mg DR capsule Take 1 capsule (30 mg) by mouth once daily. Do not crush or chew.      DULoxetine (Cymbalta) 30 mg DR capsule Take 1 Capsule by mouth once daily 30 capsule 0    fenofibrate (Triglide) 160 mg tablet Take 1 tablet (160 mg) by mouth once daily.      gabapentin (Neurontin) 300 mg capsule Take 2 capsules (600 mg) by mouth 3 times a day.      gabapentin (Neurontin) 600 mg tablet Take 1 tablet by mouth three times a day. 270 tablet 3     hydrOXYzine HCL (Atarax) 10 mg tablet Take 1 Tablet by mouth 3 (three) times daily as needed for anxiety 30 tablet 5    lacosamide (Vimpat) 150 mg tablet tablet Take 1 tablet (150 mg) by mouth 2 times a day.      lacosamide (Vimpat) 150 mg tablet tablet Take 1 tablet by mouth two times a day for 180 days. 60 tablet 5    lamoTRIgine (LaMICtal) 25 mg tablet Take 3 tablets by mouth once daily. 90 tablet 0    levoFLOXacin (Levaquin) 500 mg tablet Take 1 Tablet by mouth once daily for 7 days 7 tablet 0    levothyroxine (Synthroid, Levoxyl) 150 mcg tablet Take 1 tablet (150 mcg) by mouth early in the morning.. 30 tablet 0    levothyroxine (Synthroid, Levoxyl) 150 mcg tablet Take 1 tablet (150 mcg) by mouth once daily. 30 tablet 3    lisinopril 40 mg tablet Take 1 tablet (40 mg) by mouth once daily.      lisinopril 40 mg tablet Take 1 tablet (40 mg) by mouth once daily. 30 tablet 5    loperamide (Imodium) 2 mg capsule Take 1 Capsule by mouth 2 (two) times daily as needed for diarrhea 20 capsule 1    meclizine (Antivert) 12.5 mg tablet Take 1 Tablet by mouth once daily as needed (dizzy) for up to 30 days 30 tablet 0    meloxicam (Mobic) 7.5 mg tablet Take 1 tablet (7.5 mg) by mouth if needed.      mirtazapine (Remeron) 15 mg tablet Take 1 tablet (15 mg) by mouth once daily at bedtime.      mirtazapine (Remeron) 15 mg tablet Take 1 tablet (15 mg) by mouth once daily at bedtime. 30 tablet 4    mometasone-formoterol (Dulera 200) 200-5 mcg/actuation inhaler Inhale 2 puffs by mouth and into the lungs two times a day. Rinse mouth after each use 13 g 2    mometasone-formoterol (Dulera) 200-5 mcg/actuation inhaler Inhale 2 puffs 2 times a day.      naproxen (Naprosyn) 500 mg tablet Take 1 tablet (500 mg) by mouth 2 times daily (morning and late afternoon) for 15 days. 30 tablet 0    neomycin-polymyxin-HC (Cortisporin) otic solution Place 4 Drops into both ears 3 (three) times daily as needed for ear pain 10 mL 0     nirmatrelvir-ritonavir (Paxlovid) 300 mg (150 mg x 2)-100 mg tablet therapy pack Take 3 Tablets by mouth every morning and evening for 5 days 30 tablet 0    omega 3-dha-epa-fish oil 300 mg (120 mg- 180mg)-1,000 mg capsule Take 1 capsule by mouth 2 times a day. 60 capsule 1    ondansetron (Zofran) 4 mg tablet Take 1 tablet (4 mg) by mouth every 8 hours if needed for nausea or vomiting.      pantoprazole (ProtoNix) 40 mg EC tablet Take 1 tablet (40 mg) by mouth once daily in the morning. Take before meals.      SUMAtriptan (Imitrex) 50 mg tablet Take 1 tablet (50 mg) by mouth every 2 hours if needed for migraine for up to 1 dose. May repeat dose once in 2 hours if no relief.  Do not exceed 2 doses in 24 hours.      SUMAtriptan (Imitrex) 50 mg tablet Take 1 Tablet by mouth 1 (one) time as needed for migraine for up to 1 dose May repeat dose 2 hours after 30 tablet 1    thiamine 100 mg tablet Take 1 tablet (100 mg) by mouth once daily. 30 tablet 2    tiZANidine (Zanaflex) 4 mg tablet Take 1 tablet (4 mg) by mouth every 8 hours if needed for muscle spasms for up to 28 days. 84 tablet 3    traMADol (Ultram) 50 mg tablet take 1 tablet by mouth  every 8 hours as needed 21 tablet 0    traZODone (Desyrel) 100 mg tablet Take 1 tablet (100 mg) by mouth once daily at bedtime. 30 tablet 0    traZODone (Desyrel) 300 mg tablet Take 1 tablet (300 mg) by mouth once daily at bedtime.      ubrogepant (Ubrelvy) 100 mg tablet tablet Take 1 tablet by mouth once daily as needed 10 tablet 9    vilazodone (Viibryd) 40 mg tablet Take 1 Tablet by mouth once daily every evening 30 tablet 5    zavegepant (Zavzpret) 10 mg/actuation spray,non-aerosol 1 [place] by INTRANASAL route once daily as needed. 6 each 11    zolpidem (Ambien) 10 mg tablet TAKE 1 TABLET BY MOUTH Once Daily AT BEDTIME AS NEEDED FOR SLEEP 30 tablet 2     No current facility-administered medications on file prior to visit.       Physical Exam  Constitutional: well developed, well  nourished female in no acute distress  Psychiatric: normal mood, appropriate affect  Eyes: sclera anicteric  HENT: normocephalic/atraumatic  CV: regular rate and rhythm   Respiratory: non labored breathing  Integumentary: no rash  Neurological: moves all extremities    Right Ankle Exam     Tenderness   The patient is experiencing tenderness in the lateral malleolus and medial malleolus.  Swelling: mild    Other   Erythema: absent  Scars: present  Sensation: normal     Comments:  Ecchymosis medial ankle.  Decreased ROM of ankle, normally has decreased ROM vs left due to past injuries.              Imaging/Lab:  X-rays were taken 11/21/24 which were reviewed by myself and read by radiology and show There is oblique lucency through the distal fibular diaphysis/metaphysis junction, consistent with a minimally displaced fracture. There is also small cortical irregularity at the base of the medial malleolus, possibly representing additional avulsion fracture. There is associated soft tissue swelling.  The alignment is anatomic.      Plate and screws are in place fusing tarsal bones and the base of the 2nd and 3rd metatarsal bones dorsally. The screw through the navicular bone is recommended. There is pes planus deformity.  Degenerative changes involve the talonavicular, calcaneal cuboid and several tarsometatarsal joints and the 1st metatarsophalangeal joint.      Assessment  Assessment: distal fibula fracture, medial malleolus avulsion    Plan  Plan:  History, physical exam, and imaging were reviewed with patient. Placed into CAM boot.  She should be using crutches or walker to keep weight off the ankle the best she can.  RICE and antiinflammatories.   Medication: short prescription of Tramadol sent for acute pain.  Will not refill, should transition to NSAID or Tylenol for pain.  Follow Up: 4 weeks with new x-ray    All questions were answered for the patient prior to end of exam and patient addressed their  understanding.    Анна Yeboah PA-C  11/27/24

## 2024-12-05 ENCOUNTER — PHARMACY VISIT (OUTPATIENT)
Dept: PHARMACY | Facility: CLINIC | Age: 59
End: 2024-12-05
Payer: MEDICAID

## 2024-12-05 PROCEDURE — RXMED WILLOW AMBULATORY MEDICATION CHARGE

## 2024-12-06 ENCOUNTER — PHARMACY VISIT (OUTPATIENT)
Dept: PHARMACY | Facility: CLINIC | Age: 59
End: 2024-12-06
Payer: MEDICAID

## 2024-12-06 PROCEDURE — RXMED WILLOW AMBULATORY MEDICATION CHARGE

## 2024-12-09 PROCEDURE — RXMED WILLOW AMBULATORY MEDICATION CHARGE

## 2024-12-10 ENCOUNTER — PHARMACY VISIT (OUTPATIENT)
Dept: PHARMACY | Facility: CLINIC | Age: 59
End: 2024-12-10
Payer: MEDICAID

## 2024-12-10 PROCEDURE — RXMED WILLOW AMBULATORY MEDICATION CHARGE

## 2024-12-10 RX ORDER — BENZTROPINE MESYLATE 0.5 MG/1
0.5 TABLET ORAL 2 TIMES DAILY
Qty: 60 TABLET | Refills: 5 | OUTPATIENT
Start: 2024-12-10

## 2024-12-10 RX ORDER — PRAZOSIN HYDROCHLORIDE 1 MG/1
CAPSULE ORAL
Qty: 30 CAPSULE | Refills: 5 | OUTPATIENT
Start: 2024-12-10

## 2024-12-10 RX ORDER — BUSPIRONE HYDROCHLORIDE 15 MG/1
15 TABLET ORAL 2 TIMES DAILY
Qty: 60 TABLET | Refills: 5 | OUTPATIENT
Start: 2024-12-10

## 2024-12-10 RX ORDER — ZOLPIDEM TARTRATE 10 MG/1
10 TABLET ORAL NIGHTLY PRN
Qty: 30 TABLET | Refills: 5 | OUTPATIENT
Start: 2024-12-10

## 2024-12-11 ENCOUNTER — PHARMACY VISIT (OUTPATIENT)
Dept: PHARMACY | Facility: CLINIC | Age: 59
End: 2024-12-11
Payer: MEDICAID

## 2024-12-12 PROCEDURE — RXMED WILLOW AMBULATORY MEDICATION CHARGE

## 2024-12-12 RX ORDER — CALCIUM CARBONATE/VITAMIN D3 600MG-62.5
1 CAPSULE ORAL 2 TIMES DAILY
Qty: 60 CAPSULE | Refills: 1 | Status: CANCELLED | OUTPATIENT
Start: 2024-12-12

## 2024-12-13 ENCOUNTER — PHARMACY VISIT (OUTPATIENT)
Dept: PHARMACY | Facility: CLINIC | Age: 59
End: 2024-12-13
Payer: MEDICAID

## 2024-12-17 ENCOUNTER — PHARMACY VISIT (OUTPATIENT)
Dept: PHARMACY | Facility: CLINIC | Age: 59
End: 2024-12-17
Payer: MEDICAID

## 2024-12-17 PROCEDURE — RXMED WILLOW AMBULATORY MEDICATION CHARGE

## 2024-12-17 RX ORDER — TRAZODONE HYDROCHLORIDE 100 MG/1
100 TABLET ORAL NIGHTLY
Qty: 30 TABLET | Refills: 0 | Status: CANCELLED | OUTPATIENT
Start: 2024-12-17

## 2024-12-19 ENCOUNTER — APPOINTMENT (OUTPATIENT)
Dept: ORTHOPEDIC SURGERY | Facility: CLINIC | Age: 59
End: 2024-12-19
Payer: MEDICAID

## 2024-12-20 ENCOUNTER — PHARMACY VISIT (OUTPATIENT)
Dept: PHARMACY | Facility: CLINIC | Age: 59
End: 2024-12-20
Payer: MEDICAID

## 2024-12-20 ENCOUNTER — HOSPITAL ENCOUNTER (OUTPATIENT)
Dept: RADIOLOGY | Facility: CLINIC | Age: 59
Discharge: HOME | End: 2024-12-20
Payer: MEDICAID

## 2024-12-20 ENCOUNTER — APPOINTMENT (OUTPATIENT)
Dept: ORTHOPEDIC SURGERY | Facility: CLINIC | Age: 59
End: 2024-12-20
Payer: MEDICAID

## 2024-12-20 VITALS — WEIGHT: 196 LBS | HEIGHT: 68 IN | BODY MASS INDEX: 29.7 KG/M2

## 2024-12-20 DIAGNOSIS — S82.431A CLOSED DISPLACED OBLIQUE FRACTURE OF SHAFT OF RIGHT FIBULA, INITIAL ENCOUNTER: ICD-10-CM

## 2024-12-20 DIAGNOSIS — S82.431A CLOSED DISPLACED OBLIQUE FRACTURE OF SHAFT OF RIGHT FIBULA, INITIAL ENCOUNTER: Primary | ICD-10-CM

## 2024-12-20 PROCEDURE — RXMED WILLOW AMBULATORY MEDICATION CHARGE

## 2024-12-20 PROCEDURE — 99213 OFFICE O/P EST LOW 20 MIN: CPT

## 2024-12-20 PROCEDURE — 73610 X-RAY EXAM OF ANKLE: CPT | Mod: RT

## 2024-12-20 PROCEDURE — 3008F BODY MASS INDEX DOCD: CPT

## 2024-12-20 RX ORDER — TRAZODONE HYDROCHLORIDE 100 MG/1
100 TABLET ORAL NIGHTLY
Qty: 30 TABLET | Refills: 0 | OUTPATIENT
Start: 2024-12-20

## 2024-12-20 RX ORDER — CALCIUM CARBONATE/VITAMIN D3 600MG-62.5
1 CAPSULE ORAL 2 TIMES DAILY
Qty: 60 CAPSULE | Refills: 1 | Status: CANCELLED | OUTPATIENT
Start: 2024-12-12

## 2024-12-20 RX ORDER — IBUPROFEN 600 MG/1
TABLET ORAL
Qty: 90 TABLET | Refills: 0 | OUTPATIENT
Start: 2024-12-20

## 2024-12-20 RX ORDER — ONDANSETRON 4 MG/1
TABLET, FILM COATED ORAL
Qty: 45 TABLET | Refills: 0 | OUTPATIENT
Start: 2024-12-20

## 2024-12-20 ASSESSMENT — PAIN - FUNCTIONAL ASSESSMENT: PAIN_FUNCTIONAL_ASSESSMENT: 0-10

## 2024-12-20 ASSESSMENT — PAIN SCALES - GENERAL: PAINLEVEL_OUTOF10: 0 - NO PAIN

## 2024-12-20 ASSESSMENT — PAIN DESCRIPTION - DESCRIPTORS: DESCRIPTORS: DISCOMFORT

## 2024-12-20 NOTE — PROGRESS NOTES
HPI  Erica Machado is a 58 y.o. female in office today for follow up of side: right ankle distal fibula fracture.  she states the pain is a lot better.  No pain lateral side of ankle, mild pain medial side.  Arrived in boot.  Her sister has been making sure she wears it and keeping weight off the ankle.  No new concerns or issues.      Physical Exam  Constitutional: well developed, well nourished female in no acute distress  Psychiatric: normal mood, appropriate affect  Eyes: sclera anicteric  HENT: normocephalic/atraumatic  CV: regular rate and rhythm   Respiratory: non labored breathing  Integumentary: no rash  Neurological: moves all extremities    Right Ankle Exam     Tenderness   The patient is experiencing tenderness in the deltoid.  Swelling: none    Range of Motion   Dorsiflexion:  10 (baseline is less than normal given past injuries)   Plantar flexion:  20     Other   Erythema: absent  Scars: present  Sensation: normal             Imaging/Lab:  X-rays were taken today which were reviewed by myself and read by myself and show callus formation about the distal fibula fracture.  No change in alignment.  Unchanged appearance of medial malleolus irregularity.  Improved soft tissue swelling.     Assessment  Assessment: right distal fibula fracture    Plan  Plan:  History, physical exam, and imaging were reviewed with patient. Discussed starting to wean out of the boot over the next several weeks.  Should continue to wear if walking longer periods, uneven or icy surfaces.  OK to remove at night, walking around house.  PT option if continued pain or unable to regain motion once out of the boot.  RICE and pain medication as needed.  Follow Up: as needed for any issues or concerns    All questions were answered for the patient prior to end of exam and patient addressed their understanding.    Анна Yeboah PA-C  12/20/24

## 2024-12-30 ENCOUNTER — PHARMACY VISIT (OUTPATIENT)
Dept: PHARMACY | Facility: CLINIC | Age: 59
End: 2024-12-30
Payer: MEDICAID

## 2024-12-30 DIAGNOSIS — E78.2 MODERATE MIXED HYPERLIPIDEMIA NOT REQUIRING STATIN THERAPY: ICD-10-CM

## 2024-12-30 PROCEDURE — RXMED WILLOW AMBULATORY MEDICATION CHARGE

## 2024-12-30 RX ORDER — LIDOCAINE 560 MG/1
PATCH PERCUTANEOUS; TOPICAL; TRANSDERMAL
Qty: 5 PATCH | Refills: 0 | OUTPATIENT
Start: 2024-12-30

## 2024-12-30 RX ORDER — HYDROCODONE BITARTRATE AND ACETAMINOPHEN 5; 325 MG/1; MG/1
TABLET ORAL
Qty: 9 TABLET | Refills: 0 | OUTPATIENT
Start: 2024-12-30

## 2024-12-31 ENCOUNTER — PHARMACY VISIT (OUTPATIENT)
Dept: PHARMACY | Facility: CLINIC | Age: 59
End: 2024-12-31
Payer: MEDICAID

## 2024-12-31 PROCEDURE — RXMED WILLOW AMBULATORY MEDICATION CHARGE

## 2024-12-31 RX ORDER — GABAPENTIN 600 MG/1
600 TABLET ORAL 3 TIMES DAILY
Qty: 270 TABLET | Refills: 3 | OUTPATIENT
Start: 2024-08-21

## 2024-12-31 RX ORDER — HALOPERIDOL DECANOATE 50 MG/ML
INJECTION INTRAMUSCULAR
Qty: 2 ML | Refills: 6 | OUTPATIENT
Start: 2024-09-03

## 2024-12-31 RX ORDER — BUTALBITAL, ACETAMINOPHEN AND CAFFEINE 50; 325; 40 MG/1; MG/1; MG/1
1 TABLET ORAL EVERY 6 HOURS PRN
Qty: 24 TABLET | Refills: 5 | OUTPATIENT
Start: 2024-09-09

## 2025-01-02 PROCEDURE — RXMED WILLOW AMBULATORY MEDICATION CHARGE

## 2025-01-03 PROCEDURE — RXMED WILLOW AMBULATORY MEDICATION CHARGE

## 2025-01-05 RX ORDER — CALCIUM CARBONATE/VITAMIN D3 600MG-62.5
1 CAPSULE ORAL 2 TIMES DAILY
Qty: 60 CAPSULE | Refills: 1 | Status: SHIPPED | OUTPATIENT
Start: 2025-01-05

## 2025-01-06 DIAGNOSIS — D64.9 ANEMIA, UNSPECIFIED TYPE: ICD-10-CM

## 2025-01-06 PROCEDURE — RXMED WILLOW AMBULATORY MEDICATION CHARGE

## 2025-01-07 RX ORDER — DULOXETIN HYDROCHLORIDE 30 MG/1
30 CAPSULE, DELAYED RELEASE ORAL DAILY
Qty: 30 CAPSULE | Refills: 0 | Status: CANCELLED | OUTPATIENT
Start: 2025-01-07

## 2025-01-08 ENCOUNTER — PHARMACY VISIT (OUTPATIENT)
Dept: PHARMACY | Facility: CLINIC | Age: 60
End: 2025-01-08
Payer: MEDICAID

## 2025-01-10 PROCEDURE — RXMED WILLOW AMBULATORY MEDICATION CHARGE

## 2025-01-13 ENCOUNTER — PHARMACY VISIT (OUTPATIENT)
Dept: PHARMACY | Facility: CLINIC | Age: 60
End: 2025-01-13
Payer: MEDICAID

## 2025-01-13 PROCEDURE — RXMED WILLOW AMBULATORY MEDICATION CHARGE

## 2025-01-13 RX ORDER — BUTALBITAL, ACETAMINOPHEN AND CAFFEINE 50; 325; 40 MG/1; MG/1; MG/1
TABLET ORAL
Qty: 24 TABLET | Refills: 5 | OUTPATIENT
Start: 2025-01-13

## 2025-01-13 RX ORDER — BUTALBITAL, ACETAMINOPHEN AND CAFFEINE 50; 325; 40 MG/1; MG/1; MG/1
TABLET ORAL
Qty: 24 TABLET | Refills: 5 | Status: CANCELLED | OUTPATIENT
Start: 2025-01-13

## 2025-01-17 PROCEDURE — RXMED WILLOW AMBULATORY MEDICATION CHARGE

## 2025-01-20 ENCOUNTER — PHARMACY VISIT (OUTPATIENT)
Dept: PHARMACY | Facility: CLINIC | Age: 60
End: 2025-01-20
Payer: MEDICAID

## 2025-01-20 PROCEDURE — RXMED WILLOW AMBULATORY MEDICATION CHARGE

## 2025-01-21 PROCEDURE — RXMED WILLOW AMBULATORY MEDICATION CHARGE

## 2025-01-23 ENCOUNTER — PHARMACY VISIT (OUTPATIENT)
Dept: PHARMACY | Facility: CLINIC | Age: 60
End: 2025-01-23
Payer: MEDICAID

## 2025-01-27 DIAGNOSIS — E03.9 HYPOTHYROIDISM, UNSPECIFIED TYPE: ICD-10-CM

## 2025-01-27 PROCEDURE — RXMED WILLOW AMBULATORY MEDICATION CHARGE

## 2025-01-29 ENCOUNTER — PHARMACY VISIT (OUTPATIENT)
Dept: PHARMACY | Facility: CLINIC | Age: 60
End: 2025-01-29
Payer: MEDICAID

## 2025-01-29 PROCEDURE — RXMED WILLOW AMBULATORY MEDICATION CHARGE

## 2025-01-29 RX ORDER — LEVOTHYROXINE SODIUM 150 UG/1
150 TABLET ORAL DAILY
Qty: 30 TABLET | Refills: 3 | Status: SHIPPED | OUTPATIENT
Start: 2025-01-29

## 2025-01-29 RX ORDER — LANOLIN ALCOHOL/MO/W.PET/CERES
100 CREAM (GRAM) TOPICAL DAILY
Qty: 30 TABLET | Refills: 2 | Status: SHIPPED | OUTPATIENT
Start: 2025-01-29

## 2025-01-31 PROCEDURE — RXMED WILLOW AMBULATORY MEDICATION CHARGE

## 2025-02-03 PROCEDURE — RXMED WILLOW AMBULATORY MEDICATION CHARGE

## 2025-02-04 PROCEDURE — RXMED WILLOW AMBULATORY MEDICATION CHARGE

## 2025-02-05 ENCOUNTER — PHARMACY VISIT (OUTPATIENT)
Dept: PHARMACY | Facility: CLINIC | Age: 60
End: 2025-02-05
Payer: MEDICAID

## 2025-02-05 PROCEDURE — RXMED WILLOW AMBULATORY MEDICATION CHARGE

## 2025-02-05 RX ORDER — BUTALBITAL, ACETAMINOPHEN AND CAFFEINE 50; 325; 40 MG/1; MG/1; MG/1
TABLET ORAL
Qty: 45 TABLET | Refills: 0 | Status: CANCELLED | OUTPATIENT
Start: 2025-02-05

## 2025-02-10 ENCOUNTER — PHARMACY VISIT (OUTPATIENT)
Dept: PHARMACY | Facility: CLINIC | Age: 60
End: 2025-02-10
Payer: MEDICAID

## 2025-02-10 PROCEDURE — RXMED WILLOW AMBULATORY MEDICATION CHARGE

## 2025-02-10 RX ORDER — BUTALBITAL, ACETAMINOPHEN AND CAFFEINE 50; 325; 40 MG/1; MG/1; MG/1
TABLET ORAL
Qty: 24 TABLET | Refills: 5 | OUTPATIENT
Start: 2025-02-10

## 2025-02-10 RX ORDER — LAMOTRIGINE 25 MG/1
TABLET ORAL DAILY
Qty: 90 TABLET | Refills: 5 | OUTPATIENT
Start: 2025-02-10

## 2025-02-10 RX ORDER — DULOXETIN HYDROCHLORIDE 30 MG/1
30 CAPSULE, DELAYED RELEASE ORAL DAILY
Qty: 30 CAPSULE | Refills: 5 | OUTPATIENT
Start: 2025-02-10

## 2025-02-12 PROCEDURE — RXMED WILLOW AMBULATORY MEDICATION CHARGE

## 2025-02-13 ENCOUNTER — PHARMACY VISIT (OUTPATIENT)
Dept: PHARMACY | Facility: CLINIC | Age: 60
End: 2025-02-13
Payer: MEDICAID

## 2025-02-13 PROCEDURE — RXMED WILLOW AMBULATORY MEDICATION CHARGE

## 2025-02-17 ENCOUNTER — PHARMACY VISIT (OUTPATIENT)
Dept: PHARMACY | Facility: CLINIC | Age: 60
End: 2025-02-17
Payer: MEDICAID

## 2025-02-18 ENCOUNTER — PRE-ADMISSION TESTING (OUTPATIENT)
Dept: PREADMISSION TESTING | Facility: HOSPITAL | Age: 60
End: 2025-02-18
Payer: MEDICAID

## 2025-02-20 DIAGNOSIS — Z12.11 SCREEN FOR COLON CANCER: ICD-10-CM

## 2025-02-20 PROCEDURE — RXMED WILLOW AMBULATORY MEDICATION CHARGE

## 2025-02-20 RX ORDER — SODIUM PICOSULFATE, MAGNESIUM OXIDE, AND ANHYDROUS CITRIC ACID 12; 3.5; 1 G/175ML; G/175ML; MG/175ML
2 LIQUID ORAL AS NEEDED
Qty: 350 ML | Refills: 0 | Status: SHIPPED | OUTPATIENT
Start: 2025-02-20 | End: 2025-02-21 | Stop reason: WASHOUT

## 2025-02-20 RX ORDER — SODIUM CHLORIDE, SODIUM LACTATE, POTASSIUM CHLORIDE, CALCIUM CHLORIDE 600; 310; 30; 20 MG/100ML; MG/100ML; MG/100ML; MG/100ML
50 INJECTION, SOLUTION INTRAVENOUS CONTINUOUS
OUTPATIENT
Start: 2025-02-20 | End: 2025-02-21

## 2025-02-21 ENCOUNTER — PHARMACY VISIT (OUTPATIENT)
Dept: PHARMACY | Facility: CLINIC | Age: 60
End: 2025-02-21
Payer: MEDICAID

## 2025-02-21 ENCOUNTER — OFFICE VISIT (OUTPATIENT)
Dept: PRIMARY CARE | Facility: CLINIC | Age: 60
End: 2025-02-21
Payer: MEDICAID

## 2025-02-21 VITALS
TEMPERATURE: 97.9 F | BODY MASS INDEX: 31.8 KG/M2 | HEIGHT: 68 IN | DIASTOLIC BLOOD PRESSURE: 70 MMHG | SYSTOLIC BLOOD PRESSURE: 140 MMHG | WEIGHT: 209.8 LBS | OXYGEN SATURATION: 98 % | HEART RATE: 72 BPM

## 2025-02-21 DIAGNOSIS — G40.909 SEIZURE DISORDER (MULTI): ICD-10-CM

## 2025-02-21 DIAGNOSIS — I10 PRIMARY HYPERTENSION: ICD-10-CM

## 2025-02-21 DIAGNOSIS — K13.79 MOUTH PAIN: ICD-10-CM

## 2025-02-21 DIAGNOSIS — G43.009 MIGRAINE WITHOUT AURA AND WITHOUT STATUS MIGRAINOSUS, NOT INTRACTABLE: Primary | ICD-10-CM

## 2025-02-21 DIAGNOSIS — R10.84 GENERALIZED ABDOMINAL PAIN: ICD-10-CM

## 2025-02-21 PROCEDURE — RXMED WILLOW AMBULATORY MEDICATION CHARGE

## 2025-02-21 PROCEDURE — 3078F DIAST BP <80 MM HG: CPT | Performed by: FAMILY MEDICINE

## 2025-02-21 PROCEDURE — 99214 OFFICE O/P EST MOD 30 MIN: CPT | Performed by: FAMILY MEDICINE

## 2025-02-21 PROCEDURE — 3077F SYST BP >= 140 MM HG: CPT | Performed by: FAMILY MEDICINE

## 2025-02-21 PROCEDURE — 3008F BODY MASS INDEX DOCD: CPT | Performed by: FAMILY MEDICINE

## 2025-02-21 RX ORDER — AMLODIPINE BESYLATE 10 MG/1
10 TABLET ORAL DAILY
Qty: 30 TABLET | Refills: 11 | Status: SHIPPED | OUTPATIENT
Start: 2025-02-21

## 2025-02-21 RX ORDER — SUCRALFATE 1 G/1
1 TABLET ORAL
Qty: 60 TABLET | Refills: 11 | Status: SHIPPED | OUTPATIENT
Start: 2025-02-21 | End: 2026-02-21

## 2025-02-21 RX ORDER — SODIUM PICOSULFATE, MAGNESIUM OXIDE, AND ANHYDROUS CITRIC ACID 12; 3.5; 1 G/175ML; G/175ML; MG/175ML
LIQUID ORAL
Qty: 350 ML | Refills: 0 | OUTPATIENT
Start: 2025-02-21

## 2025-02-21 RX ORDER — SUCRALFATE 1 G/10ML
1 SUSPENSION ORAL 2 TIMES DAILY
Refills: 1 | Status: CANCELLED | OUTPATIENT
Start: 2025-02-21

## 2025-02-21 RX ORDER — LIDOCAINE HYDROCHLORIDE 20 MG/ML
1.25 SOLUTION OROPHARYNGEAL EVERY 4 HOURS PRN
Qty: 100 ML | Refills: 0 | Status: SHIPPED | OUTPATIENT
Start: 2025-02-21

## 2025-02-21 RX ORDER — PREDNISONE 10 MG/1
TABLET ORAL
Qty: 21 TABLET | Refills: 0 | Status: SHIPPED | OUTPATIENT
Start: 2025-02-21 | End: 2025-02-27

## 2025-02-21 RX ORDER — OXCARBAZEPINE 300 MG/1
300 TABLET, FILM COATED ORAL 2 TIMES DAILY
Qty: 60 TABLET | Refills: 5 | Status: SHIPPED | OUTPATIENT
Start: 2025-02-21

## 2025-02-21 RX ORDER — BUTALBITAL, ACETAMINOPHEN AND CAFFEINE 50; 325; 40 MG/1; MG/1; MG/1
TABLET ORAL
Qty: 24 TABLET | Refills: 5 | OUTPATIENT
Start: 2025-02-21

## 2025-02-21 RX ORDER — SUCRALFATE 1 G/10ML
1 SUSPENSION ORAL 2 TIMES DAILY
COMMUNITY
End: 2025-02-21 | Stop reason: WASHOUT

## 2025-02-21 ASSESSMENT — ENCOUNTER SYMPTOMS
FEVER: 0
DIZZINESS: 0
ENDOCRINE NEGATIVE: 1
NAUSEA: 0
DEPRESSION: 0
DIFFICULTY URINATING: 0
DIARRHEA: 0
OCCASIONAL FEELINGS OF UNSTEADINESS: 0
LOSS OF SENSATION IN FEET: 0
SHORTNESS OF BREATH: 0

## 2025-02-21 ASSESSMENT — PAIN SCALES - GENERAL: PAINLEVEL_OUTOF10: 8

## 2025-02-21 ASSESSMENT — PATIENT HEALTH QUESTIONNAIRE - PHQ9
1. LITTLE INTEREST OR PLEASURE IN DOING THINGS: NOT AT ALL
2. FEELING DOWN, DEPRESSED OR HOPELESS: NOT AT ALL
SUM OF ALL RESPONSES TO PHQ9 QUESTIONS 1 AND 2: 0

## 2025-02-21 NOTE — PROGRESS NOTES
"Subjective   Patient ID: Erica Machado is a 59 y.o. female who presents for Migraine and sore in mouth (Just went to dentist unresolved).    HPI   The pt presents to the clinic with concerns of migraine headaches. Past medical hx of HLD, HTN, hypothyroidism, GERD, anxiety, seizures, migraines, and COPD.  Patient with migraine headaches  With fatigue  History of seizure disorder  Of anxiety, hypertension, hypothyroidism  Blood Pressure uncontrolled today, we will increase the medication amlodipine  Chronic abdominal pain  Mouth pain,, she has indentation from a dental implant in the gums  Review of Systems   Constitutional:  Negative for fever.        Also see HPI   Eyes:  Negative for visual disturbance.   Respiratory:  Negative for shortness of breath.    Cardiovascular:  Negative for chest pain.   Gastrointestinal:  Negative for diarrhea and nausea.   Endocrine: Negative.    Genitourinary:  Negative for difficulty urinating.   Skin:  Negative for rash.   Neurological:  Negative for dizziness.        No focal deficits   Psychiatric/Behavioral:  Negative for suicidal ideas.    All other systems reviewed and are negative.      Objective   /70   Pulse 72   Temp 36.6 °C (97.9 °F) (Temporal)   Ht 1.727 m (5' 8\")   Wt 95.2 kg (209 lb 12.8 oz)   LMP  (LMP Unknown)   SpO2 98%   BMI 31.90 kg/m²     Physical Exam  Vitals and nursing note reviewed.   Constitutional:       Appearance: Normal appearance.   HENT:      Head: Normocephalic and atraumatic.   Eyes:      Conjunctiva/sclera: Conjunctivae normal.   Cardiovascular:      Rate and Rhythm: Normal rate and regular rhythm.      Heart sounds: Normal heart sounds.   Pulmonary:      Effort: Pulmonary effort is normal.      Breath sounds: Normal breath sounds.   Abdominal:      General: Bowel sounds are normal.      Palpations: Abdomen is soft.   Neurological:      Mental Status: She is oriented to person, place, and time.   Psychiatric:         Mood and " Affect: Mood normal.         Behavior: Behavior normal.     Tenderness with palpation of the lower part of the inside of the mouth, indentation from the stem of the dental implant is seen    Assessment/Plan   Diagnoses and all orders for this visit:  Generalized abdominal pain  -     sucralfate (Carafate) 1 gram tablet; Take 1 tablet (1 g) by mouth 2 times a day before meals.  Primary hypertension  -     amLODIPine (Norvasc) 10 mg tablet; Take 1 tablet (10 mg) by mouth once daily.  Seizure disorder (Multi)  -     OXcarbazepine (Trileptal) 300 mg tablet; Take 1 Tablet by mouth twice a day for 30 days  Migraine without aura and without status migrainosus, not intractable  -     predniSONE (Deltasone) 10 mg tablet; Take 6 tablets (60 mg) by mouth once daily for 1 day, THEN 5 tablets (50 mg) once daily for 1 day, THEN 4 tablets (40 mg) once daily for 1 day, THEN 3 tablets (30 mg) once daily for 1 day, THEN 2 tablets (20 mg) once daily for 1 day, THEN 1 tablet (10 mg) once daily for 1 day. Take with food.  Mouth pain  -     lidocaine (Xylocaine) 2 % solution; Take 1.25 mL by mouth every 4 hours if needed (mouth pain). Do Not Swallow. spit out         Scribe Attestation  By signing my name below, IThu Scribe   attest that this documentation has been prepared under the direction and in the presence of Guillermo Okeefe DO.

## 2025-02-24 DIAGNOSIS — F51.01 PRIMARY INSOMNIA: Primary | ICD-10-CM

## 2025-02-24 DIAGNOSIS — K59.1 FUNCTIONAL DIARRHEA: ICD-10-CM

## 2025-02-25 ENCOUNTER — APPOINTMENT (OUTPATIENT)
Dept: GASTROENTEROLOGY | Facility: HOSPITAL | Age: 60
End: 2025-02-25
Payer: MEDICAID

## 2025-02-25 PROCEDURE — RXMED WILLOW AMBULATORY MEDICATION CHARGE

## 2025-02-25 RX ORDER — TRAZODONE HYDROCHLORIDE 100 MG/1
100 TABLET ORAL NIGHTLY
Qty: 30 TABLET | Refills: 2 | Status: SHIPPED | OUTPATIENT
Start: 2025-02-25

## 2025-02-25 RX ORDER — LOPERAMIDE HYDROCHLORIDE 2 MG/1
CAPSULE ORAL
Qty: 20 CAPSULE | Refills: 1 | Status: SHIPPED | OUTPATIENT
Start: 2025-02-25

## 2025-02-26 ENCOUNTER — PHARMACY VISIT (OUTPATIENT)
Dept: PHARMACY | Facility: CLINIC | Age: 60
End: 2025-02-26
Payer: MEDICAID

## 2025-03-03 ENCOUNTER — PHARMACY VISIT (OUTPATIENT)
Dept: PHARMACY | Facility: CLINIC | Age: 60
End: 2025-03-03
Payer: MEDICAID

## 2025-03-03 PROCEDURE — RXMED WILLOW AMBULATORY MEDICATION CHARGE

## 2025-03-03 RX ORDER — MIRTAZAPINE 15 MG/1
15 TABLET, FILM COATED ORAL NIGHTLY
Qty: 30 TABLET | Refills: 4 | OUTPATIENT
Start: 2025-03-03 | End: 2025-03-07 | Stop reason: WASHOUT

## 2025-03-03 RX ORDER — GABAPENTIN 600 MG/1
600 TABLET ORAL 3 TIMES DAILY
Qty: 270 TABLET | Refills: 3 | OUTPATIENT
Start: 2025-03-03 | End: 2025-07-06

## 2025-03-03 RX ORDER — MIRTAZAPINE 15 MG/1
15 TABLET, FILM COATED ORAL NIGHTLY
Qty: 30 TABLET | Refills: 4 | OUTPATIENT
Start: 2025-03-03

## 2025-03-05 ENCOUNTER — PHARMACY VISIT (OUTPATIENT)
Dept: PHARMACY | Facility: CLINIC | Age: 60
End: 2025-03-05
Payer: MEDICAID

## 2025-03-05 PROCEDURE — RXMED WILLOW AMBULATORY MEDICATION CHARGE

## 2025-03-07 ENCOUNTER — TELEMEDICINE (OUTPATIENT)
Dept: PRIMARY CARE | Facility: CLINIC | Age: 60
End: 2025-03-07
Payer: MEDICAID

## 2025-03-07 DIAGNOSIS — E03.9 ACQUIRED HYPOTHYROIDISM: ICD-10-CM

## 2025-03-07 DIAGNOSIS — J40 BRONCHITIS: Primary | ICD-10-CM

## 2025-03-07 PROCEDURE — RXMED WILLOW AMBULATORY MEDICATION CHARGE

## 2025-03-07 RX ORDER — ONDANSETRON 4 MG/1
1 TABLET, ORALLY DISINTEGRATING ORAL EVERY 8 HOURS PRN
COMMUNITY
Start: 2024-06-21

## 2025-03-07 RX ORDER — MULTIVITAMIN WITH IRON
1000 TABLET ORAL EVERY 12 HOURS
COMMUNITY
Start: 2024-05-28

## 2025-03-07 RX ORDER — FLUTICASONE FUROATE AND VILANTEROL TRIFENATATE 200; 25 UG/1; UG/1
1 POWDER RESPIRATORY (INHALATION) DAILY
COMMUNITY
Start: 2024-10-16

## 2025-03-07 RX ORDER — BLOOD SUGAR DIAGNOSTIC
1 STRIP MISCELLANEOUS 3 TIMES DAILY PRN
COMMUNITY
Start: 2024-10-16

## 2025-03-07 RX ORDER — AZELASTINE 1 MG/ML
1 SPRAY, METERED NASAL 2 TIMES DAILY
COMMUNITY
Start: 2024-06-24

## 2025-03-07 RX ORDER — CLARITHROMYCIN 500 MG/1
500 TABLET, FILM COATED ORAL 2 TIMES DAILY
Qty: 20 TABLET | Refills: 0 | Status: SHIPPED | OUTPATIENT
Start: 2025-03-07 | End: 2025-03-17

## 2025-03-07 RX ORDER — ARIPIPRAZOLE 400 MG
400 KIT INTRAMUSCULAR
COMMUNITY
Start: 2025-02-18

## 2025-03-07 RX ORDER — DESVENLAFAXINE 100 MG/1
100 TABLET, EXTENDED RELEASE ORAL DAILY
COMMUNITY
Start: 2024-08-21

## 2025-03-07 RX ORDER — CHLORPROMAZINE HYDROCHLORIDE 25 MG/1
25 TABLET, FILM COATED ORAL 2 TIMES DAILY PRN
COMMUNITY
Start: 2024-07-30

## 2025-03-07 RX ORDER — CHLORHEXIDINE GLUCONATE ORAL RINSE 1.2 MG/ML
15 SOLUTION DENTAL AS NEEDED
COMMUNITY
Start: 2025-01-21

## 2025-03-07 ASSESSMENT — ENCOUNTER SYMPTOMS
NAUSEA: 0
DIZZINESS: 0
SHORTNESS OF BREATH: 0
DIARRHEA: 0
FEVER: 0
ENDOCRINE NEGATIVE: 1
DIFFICULTY URINATING: 0

## 2025-03-07 ASSESSMENT — PATIENT HEALTH QUESTIONNAIRE - PHQ9
SUM OF ALL RESPONSES TO PHQ9 QUESTIONS 1 AND 2: 0
1. LITTLE INTEREST OR PLEASURE IN DOING THINGS: NOT AT ALL
2. FEELING DOWN, DEPRESSED OR HOPELESS: NOT AT ALL

## 2025-03-07 NOTE — PROGRESS NOTES
Subjective   Patient ID: Erica Machado is a 59 y.o. female who presents for discuss medication and white patches on gums.    Medicine visit with audio only 5 to 10-minute duration.  We tried to do a telehealth visit with video but we had issues with the provider being unable to hear the patient.  Patient gave consent for telehealth visit, patient was at home for telehealth visit with audio only    Patient wants her medications reviewed to make sure she is not getting multiple prescriptions of the same medicine from different providers    Patient has an irritation in the mouth, some burning, likely an infection over the past week or so         Review of Systems   Constitutional:  Negative for fever.        Also see HPI   Eyes:  Negative for visual disturbance.   Respiratory:  Negative for shortness of breath.    Cardiovascular:  Negative for chest pain.   Gastrointestinal:  Negative for diarrhea and nausea.   Endocrine: Negative.    Genitourinary:  Negative for difficulty urinating.   Skin:  Negative for rash.   Neurological:  Negative for dizziness.        No focal deficits   Psychiatric/Behavioral:  Negative for suicidal ideas.    All other systems reviewed and are negative.      Objective   LMP  (LMP Unknown)     Physical Exam  HENT:      Mouth/Throat:      Comments: Normal speech  Pulmonary:      Effort: Pulmonary effort is normal.      Comments: No Respiratory distress  Neurological:      Mental Status: She is oriented to person, place, and time.   Psychiatric:         Mood and Affect: Mood normal.         Behavior: Behavior normal.         Thought Content: Thought content normal.         Judgment: Judgment normal.         Assessment/Plan   Diagnoses and all orders for this visit:  Bronchitis  -     clarithromycin (Biaxin) 500 mg tablet; Take 1 tablet (500 mg) by mouth 2 times a day for 10 days.  Acquired hypothyroidism  -     TSH with reflex to Free T4 if abnormal; Future

## 2025-03-10 ENCOUNTER — TELEPHONE (OUTPATIENT)
Dept: PRIMARY CARE | Facility: CLINIC | Age: 60
End: 2025-03-10
Payer: MEDICAID

## 2025-03-10 ENCOUNTER — PHARMACY VISIT (OUTPATIENT)
Dept: PHARMACY | Facility: CLINIC | Age: 60
End: 2025-03-10
Payer: MEDICAID

## 2025-03-10 PROCEDURE — RXMED WILLOW AMBULATORY MEDICATION CHARGE

## 2025-03-10 NOTE — TELEPHONE ENCOUNTER
Patient states she has been retaining fluid to lower extremities all weekend and it is becoming painful to walk requesting water pill last telemedicine 3/7/25

## 2025-03-14 ENCOUNTER — PHARMACY VISIT (OUTPATIENT)
Dept: PHARMACY | Facility: CLINIC | Age: 60
End: 2025-03-14
Payer: MEDICAID

## 2025-03-14 PROCEDURE — RXMED WILLOW AMBULATORY MEDICATION CHARGE

## 2025-03-17 ENCOUNTER — HOSPITAL ENCOUNTER (EMERGENCY)
Facility: HOSPITAL | Age: 60
Discharge: ANOTHER HOSPITAL | End: 2025-03-18
Attending: EMERGENCY MEDICINE
Payer: MEDICAID

## 2025-03-17 ENCOUNTER — APPOINTMENT (OUTPATIENT)
Dept: CARDIOLOGY | Facility: HOSPITAL | Age: 60
End: 2025-03-17
Payer: MEDICAID

## 2025-03-17 ENCOUNTER — APPOINTMENT (OUTPATIENT)
Dept: RADIOLOGY | Facility: HOSPITAL | Age: 60
End: 2025-03-17
Payer: MEDICAID

## 2025-03-17 DIAGNOSIS — N30.00 ACUTE CYSTITIS WITHOUT HEMATURIA: ICD-10-CM

## 2025-03-17 DIAGNOSIS — E87.6 HYPOKALEMIA: ICD-10-CM

## 2025-03-17 DIAGNOSIS — E87.1 HYPONATREMIA: Primary | ICD-10-CM

## 2025-03-17 LAB
ALBUMIN SERPL BCP-MCNC: 4.9 G/DL (ref 3.4–5)
ALP SERPL-CCNC: 104 U/L (ref 33–110)
ALT SERPL W P-5'-P-CCNC: 15 U/L (ref 7–45)
AMPHETAMINES UR QL SCN: ABNORMAL
ANION GAP SERPL CALC-SCNC: 13 MMOL/L (ref 10–20)
ANION GAP SERPL CALC-SCNC: 15 MMOL/L (ref 10–20)
APAP SERPL-MCNC: <10 UG/ML
APPEARANCE UR: ABNORMAL
AST SERPL W P-5'-P-CCNC: 16 U/L (ref 9–39)
BARBITURATES UR QL SCN: ABNORMAL
BASOPHILS # BLD AUTO: 0.06 X10*3/UL (ref 0–0.1)
BASOPHILS NFR BLD AUTO: 0.5 %
BENZODIAZ UR QL SCN: ABNORMAL
BILIRUB SERPL-MCNC: 0.5 MG/DL (ref 0–1.2)
BILIRUB UR STRIP.AUTO-MCNC: NEGATIVE MG/DL
BUN SERPL-MCNC: 8 MG/DL (ref 6–23)
BUN SERPL-MCNC: 9 MG/DL (ref 6–23)
BZE UR QL SCN: ABNORMAL
CALCIUM SERPL-MCNC: 8.4 MG/DL (ref 8.6–10.3)
CALCIUM SERPL-MCNC: 9.5 MG/DL (ref 8.6–10.3)
CANNABINOIDS UR QL SCN: ABNORMAL
CHLORIDE SERPL-SCNC: 78 MMOL/L (ref 98–107)
CHLORIDE SERPL-SCNC: 82 MMOL/L (ref 98–107)
CO2 SERPL-SCNC: 20 MMOL/L (ref 21–32)
CO2 SERPL-SCNC: 21 MMOL/L (ref 21–32)
COLOR UR: ABNORMAL
CREAT SERPL-MCNC: 0.53 MG/DL (ref 0.5–1.05)
CREAT SERPL-MCNC: 0.62 MG/DL (ref 0.5–1.05)
EGFRCR SERPLBLD CKD-EPI 2021: >90 ML/MIN/1.73M*2
EGFRCR SERPLBLD CKD-EPI 2021: >90 ML/MIN/1.73M*2
EOSINOPHIL # BLD AUTO: 0.21 X10*3/UL (ref 0–0.7)
EOSINOPHIL NFR BLD AUTO: 1.6 %
ERYTHROCYTE [DISTWIDTH] IN BLOOD BY AUTOMATED COUNT: 12.7 % (ref 11.5–14.5)
ETHANOL SERPL-MCNC: <10 MG/DL
FENTANYL+NORFENTANYL UR QL SCN: ABNORMAL
GLUCOSE SERPL-MCNC: 100 MG/DL (ref 74–99)
GLUCOSE SERPL-MCNC: 91 MG/DL (ref 74–99)
GLUCOSE UR STRIP.AUTO-MCNC: NORMAL MG/DL
HCT VFR BLD AUTO: 38.8 % (ref 36–46)
HGB BLD-MCNC: 14.2 G/DL (ref 12–16)
IMM GRANULOCYTES # BLD AUTO: 0.06 X10*3/UL (ref 0–0.7)
IMM GRANULOCYTES NFR BLD AUTO: 0.5 % (ref 0–0.9)
INR PPP: 1 (ref 0.9–1.1)
KETONES UR STRIP.AUTO-MCNC: NEGATIVE MG/DL
LACTATE SERPL-SCNC: 0.9 MMOL/L (ref 0.4–2)
LEUKOCYTE ESTERASE UR QL STRIP.AUTO: ABNORMAL
LYMPHOCYTES # BLD AUTO: 1.57 X10*3/UL (ref 1.2–4.8)
LYMPHOCYTES NFR BLD AUTO: 12.3 %
MCH RBC QN AUTO: 31.2 PG (ref 26–34)
MCHC RBC AUTO-ENTMCNC: 36.6 G/DL (ref 32–36)
MCV RBC AUTO: 85 FL (ref 80–100)
METHADONE UR QL SCN: ABNORMAL
MONOCYTES # BLD AUTO: 0.66 X10*3/UL (ref 0.1–1)
MONOCYTES NFR BLD AUTO: 5.2 %
NEUTROPHILS # BLD AUTO: 10.24 X10*3/UL (ref 1.2–7.7)
NEUTROPHILS NFR BLD AUTO: 79.9 %
NITRITE UR QL STRIP.AUTO: ABNORMAL
NRBC BLD-RTO: 0 /100 WBCS (ref 0–0)
OPIATES UR QL SCN: ABNORMAL
OXYCODONE+OXYMORPHONE UR QL SCN: ABNORMAL
PCP UR QL SCN: ABNORMAL
PH UR STRIP.AUTO: 6.5 [PH]
PLATELET # BLD AUTO: 378 X10*3/UL (ref 150–450)
POTASSIUM SERPL-SCNC: 3 MMOL/L (ref 3.5–5.3)
POTASSIUM SERPL-SCNC: 3.3 MMOL/L (ref 3.5–5.3)
PROT SERPL-MCNC: 7.7 G/DL (ref 6.4–8.2)
PROT UR STRIP.AUTO-MCNC: NEGATIVE MG/DL
PROTHROMBIN TIME: 10.8 SECONDS (ref 9.8–12.4)
RBC # BLD AUTO: 4.55 X10*6/UL (ref 4–5.2)
RBC # UR STRIP.AUTO: ABNORMAL MG/DL
RBC #/AREA URNS AUTO: ABNORMAL /HPF
SALICYLATES SERPL-MCNC: <3 MG/DL
SODIUM SERPL-SCNC: 108 MMOL/L (ref 136–145)
SODIUM SERPL-SCNC: 110 MMOL/L (ref 136–145)
SODIUM SERPL-SCNC: 113 MMOL/L (ref 136–145)
SP GR UR STRIP.AUTO: 1.01
SQUAMOUS #/AREA URNS AUTO: ABNORMAL /HPF
T4 FREE SERPL-MCNC: 0.46 NG/DL (ref 0.61–1.12)
TSH SERPL-ACNC: 25.97 MIU/L (ref 0.44–3.98)
UROBILINOGEN UR STRIP.AUTO-MCNC: NORMAL MG/DL
WBC # BLD AUTO: 12.8 X10*3/UL (ref 4.4–11.3)
WBC #/AREA URNS AUTO: >50 /HPF
WBC CLUMPS #/AREA URNS AUTO: ABNORMAL /HPF

## 2025-03-17 PROCEDURE — 36415 COLL VENOUS BLD VENIPUNCTURE: CPT | Performed by: EMERGENCY MEDICINE

## 2025-03-17 PROCEDURE — 80307 DRUG TEST PRSMV CHEM ANLYZR: CPT | Performed by: EMERGENCY MEDICINE

## 2025-03-17 PROCEDURE — 96365 THER/PROPH/DIAG IV INF INIT: CPT

## 2025-03-17 PROCEDURE — 2500000004 HC RX 250 GENERAL PHARMACY W/ HCPCS (ALT 636 FOR OP/ED): Mod: SE | Performed by: EMERGENCY MEDICINE

## 2025-03-17 PROCEDURE — 71045 X-RAY EXAM CHEST 1 VIEW: CPT | Performed by: STUDENT IN AN ORGANIZED HEALTH CARE EDUCATION/TRAINING PROGRAM

## 2025-03-17 PROCEDURE — 85610 PROTHROMBIN TIME: CPT | Performed by: EMERGENCY MEDICINE

## 2025-03-17 PROCEDURE — 71045 X-RAY EXAM CHEST 1 VIEW: CPT

## 2025-03-17 PROCEDURE — 96367 TX/PROPH/DG ADDL SEQ IV INF: CPT

## 2025-03-17 PROCEDURE — 96366 THER/PROPH/DIAG IV INF ADDON: CPT

## 2025-03-17 PROCEDURE — 85025 COMPLETE CBC W/AUTO DIFF WBC: CPT | Performed by: EMERGENCY MEDICINE

## 2025-03-17 PROCEDURE — 84443 ASSAY THYROID STIM HORMONE: CPT

## 2025-03-17 PROCEDURE — 2500000004 HC RX 250 GENERAL PHARMACY W/ HCPCS (ALT 636 FOR OP/ED): Mod: SE

## 2025-03-17 PROCEDURE — 2580000001 HC RX 258 IV SOLUTIONS: Mod: SE | Performed by: EMERGENCY MEDICINE

## 2025-03-17 PROCEDURE — 93005 ELECTROCARDIOGRAM TRACING: CPT

## 2025-03-17 PROCEDURE — 81001 URINALYSIS AUTO W/SCOPE: CPT | Performed by: EMERGENCY MEDICINE

## 2025-03-17 PROCEDURE — 80053 COMPREHEN METABOLIC PANEL: CPT | Performed by: EMERGENCY MEDICINE

## 2025-03-17 PROCEDURE — 80048 BASIC METABOLIC PNL TOTAL CA: CPT | Mod: CCI | Performed by: EMERGENCY MEDICINE

## 2025-03-17 PROCEDURE — 96375 TX/PRO/DX INJ NEW DRUG ADDON: CPT

## 2025-03-17 PROCEDURE — 84133 ASSAY OF URINE POTASSIUM: CPT | Mod: GENLAB

## 2025-03-17 PROCEDURE — 80143 DRUG ASSAY ACETAMINOPHEN: CPT | Performed by: EMERGENCY MEDICINE

## 2025-03-17 PROCEDURE — 83935 ASSAY OF URINE OSMOLALITY: CPT | Mod: GENLAB

## 2025-03-17 PROCEDURE — 80179 DRUG ASSAY SALICYLATE: CPT | Performed by: EMERGENCY MEDICINE

## 2025-03-17 PROCEDURE — 96372 THER/PROPH/DIAG INJ SC/IM: CPT

## 2025-03-17 PROCEDURE — 83930 ASSAY OF BLOOD OSMOLALITY: CPT | Mod: GENLAB

## 2025-03-17 PROCEDURE — 84295 ASSAY OF SERUM SODIUM: CPT | Performed by: EMERGENCY MEDICINE

## 2025-03-17 PROCEDURE — 84439 ASSAY OF FREE THYROXINE: CPT

## 2025-03-17 PROCEDURE — 83605 ASSAY OF LACTIC ACID: CPT | Performed by: EMERGENCY MEDICINE

## 2025-03-17 PROCEDURE — 96361 HYDRATE IV INFUSION ADD-ON: CPT

## 2025-03-17 PROCEDURE — 99285 EMERGENCY DEPT VISIT HI MDM: CPT | Mod: 25 | Performed by: EMERGENCY MEDICINE

## 2025-03-17 PROCEDURE — 82077 ASSAY SPEC XCP UR&BREATH IA: CPT | Performed by: EMERGENCY MEDICINE

## 2025-03-17 RX ORDER — SODIUM CHLORIDE 9 MG/ML
100 INJECTION, SOLUTION INTRAVENOUS CONTINUOUS
Status: DISCONTINUED | OUTPATIENT
Start: 2025-03-17 | End: 2025-03-18 | Stop reason: HOSPADM

## 2025-03-17 RX ORDER — HALOPERIDOL LACTATE 5 MG/ML
INJECTION, SOLUTION INTRAMUSCULAR
Status: COMPLETED
Start: 2025-03-17 | End: 2025-03-17

## 2025-03-17 RX ORDER — DIPHENHYDRAMINE HYDROCHLORIDE 50 MG/ML
25 INJECTION, SOLUTION INTRAMUSCULAR; INTRAVENOUS ONCE
Status: COMPLETED | OUTPATIENT
Start: 2025-03-17 | End: 2025-03-17

## 2025-03-17 RX ORDER — DIPHENHYDRAMINE HYDROCHLORIDE 50 MG/ML
INJECTION, SOLUTION INTRAMUSCULAR; INTRAVENOUS
Status: COMPLETED
Start: 2025-03-17 | End: 2025-03-17

## 2025-03-17 RX ORDER — POTASSIUM CHLORIDE 14.9 MG/ML
20 INJECTION INTRAVENOUS ONCE
Status: COMPLETED | OUTPATIENT
Start: 2025-03-17 | End: 2025-03-17

## 2025-03-17 RX ORDER — HALOPERIDOL LACTATE 5 MG/ML
5 INJECTION, SOLUTION INTRAMUSCULAR ONCE
Status: COMPLETED | OUTPATIENT
Start: 2025-03-17 | End: 2025-03-17

## 2025-03-17 RX ORDER — CEFTRIAXONE 1 G/50ML
1 INJECTION, SOLUTION INTRAVENOUS ONCE
Status: COMPLETED | OUTPATIENT
Start: 2025-03-17 | End: 2025-03-17

## 2025-03-17 RX ORDER — POTASSIUM CHLORIDE 20 MEQ/1
20 TABLET, EXTENDED RELEASE ORAL DAILY
Status: DISCONTINUED | OUTPATIENT
Start: 2025-03-17 | End: 2025-03-17

## 2025-03-17 RX ADMIN — HALOPERIDOL LACTATE 5 MG: 5 INJECTION, SOLUTION INTRAMUSCULAR at 22:13

## 2025-03-17 RX ADMIN — CEFTRIAXONE 1 G: 1 INJECTION, SOLUTION INTRAVENOUS at 17:24

## 2025-03-17 RX ADMIN — SODIUM CHLORIDE 150 ML: 3 INJECTION, SOLUTION INTRAVENOUS at 18:19

## 2025-03-17 RX ADMIN — DIPHENHYDRAMINE HYDROCHLORIDE 25 MG: 50 INJECTION, SOLUTION INTRAMUSCULAR; INTRAVENOUS at 22:13

## 2025-03-17 RX ADMIN — POTASSIUM CHLORIDE 20 MEQ: 14.9 INJECTION, SOLUTION INTRAVENOUS at 20:23

## 2025-03-17 RX ADMIN — SODIUM CHLORIDE 100 ML/HR: 0.9 INJECTION, SOLUTION INTRAVENOUS at 22:19

## 2025-03-17 RX ADMIN — DIPHENHYDRAMINE HYDROCHLORIDE 25 MG: 50 INJECTION INTRAMUSCULAR; INTRAVENOUS at 22:13

## 2025-03-17 SDOH — HEALTH STABILITY: MENTAL HEALTH: BEHAVIORAL HEALTH(WDL): EXCEPTIONS TO WDL

## 2025-03-17 ASSESSMENT — COLUMBIA-SUICIDE SEVERITY RATING SCALE - C-SSRS
1. IN THE PAST MONTH, HAVE YOU WISHED YOU WERE DEAD OR WISHED YOU COULD GO TO SLEEP AND NOT WAKE UP?: NO
2. HAVE YOU ACTUALLY HAD ANY THOUGHTS OF KILLING YOURSELF?: NO
2. HAVE YOU ACTUALLY HAD ANY THOUGHTS OF KILLING YOURSELF?: NO
6. HAVE YOU EVER DONE ANYTHING, STARTED TO DO ANYTHING, OR PREPARED TO DO ANYTHING TO END YOUR LIFE?: NO
1. SINCE LAST CONTACT, HAVE YOU WISHED YOU WERE DEAD OR WISHED YOU COULD GO TO SLEEP AND NOT WAKE UP?: NO
6. HAVE YOU EVER DONE ANYTHING, STARTED TO DO ANYTHING, OR PREPARED TO DO ANYTHING TO END YOUR LIFE?: NO

## 2025-03-17 ASSESSMENT — PAIN - FUNCTIONAL ASSESSMENT: PAIN_FUNCTIONAL_ASSESSMENT: 0-10

## 2025-03-17 ASSESSMENT — PAIN SCALES - GENERAL: PAINLEVEL_OUTOF10: 0 - NO PAIN

## 2025-03-17 NOTE — ED TRIAGE NOTES
Per EMS, pt. Pt. Lives at home with her mom and her sister.  Pt. Took extra medications today, believed to be her psych meds.  Pt. States she did take extra meds but is unable to way which ones or why.  Pt. Denies S.I./H.I.  at this time.

## 2025-03-17 NOTE — ED PROVIDER NOTES
[facility]  ED  Provider Note  No admission date for patient encounter.  Room/bed info not found              No chief complaint on file.        History of Present Illness:   Erica Machado is a 59 y.o. female presenting to the ED for possible overdose, beginning unknown.  The complaint has been isolated, moderate in severity, and worsened by nothing.  Patient states she took several pills in attempt to help her sleep.  She does not know which pills she took she has no other prescription meds or over-the-counter meds.  She has no when she took them.  She denies any thoughts of hurting herself.  History of bipolar and schizoaffective disorder and is on long-acting Abilify.  Space she denies headache chest pain shortness of breath nausea vomiting or diarrhea.  She states she was just trying to sleep because her mind will not turn off.      Review of Systems:   Pertinent positives and review of systems as noted above.  Remaining 10 review of systems is negative or noncontributory to today's episode of care.  Review of Systems       --------------------------------------------- PAST HISTORY ---------------------------------------------  Past Medical History:   Diagnosis Date   • Anxiety    • COPD (chronic obstructive pulmonary disease) (Multi)    • Diverticulosis    • GERD (gastroesophageal reflux disease)    • HLD (hyperlipidemia)    • Hypertension    • Hypothyroidism    • Insomnia    • Migraine    • Osteoarthritis    • Other specified anxiety disorders 01/03/2022    Depression with anxiety   • Schizoaffective disorder (Multi)    • Seizure (Multi)          has a past surgical history that includes Foot surgery (10/13/2014); Endometrial ablation; and Foot surgery.     reports that she has quit smoking. Her smoking use included cigarettes. She has never been exposed to tobacco smoke. She has never used smokeless tobacco. She reports that she does not currently use alcohol. She reports that she does not  use drugs.    family history includes Diabetes in her mother; Emphysema in her father and mother. Unless otherwise noted, family history is non contributory    Patient's Medications   New Prescriptions    No medications on file   Previous Medications    ABILIFY MAINTENA 400 MG INJECTION SYRINGE    Inject 400 mg into the muscle every 30 (thirty) days.    ALBUTEROL 90 MCG/ACTUATION INHALER    Inhale two puffs by mouth and into the lungs every 4 hours as directed    AMLODIPINE (NORVASC) 10 MG TABLET    Take 1 tablet (10 mg) by mouth once daily.    AZELASTINE (ASTELIN) 137 MCG (0.1 %) NASAL SPRAY    Administer 1 spray into each nostril 2 times a day.    BREO ELLIPTA 200-25 MCG/DOSE INHALER    Inhale 1 puff once daily.    BREXPIPRAZOLE (REXULTI) 0.5 MG TABLET    Take 1 Tablet by mouth once daily.    BUSPIRONE (BUSPAR) 15 MG TABLET    Take 1 tablet (15 mg) by mouth 2 times a day.    BUTALBITAL-ACETAMINOPHEN-CAFF -40 MG TABLET    Take 1 tablet by mouth every 6 hours if needed.    BUTALBITAL-ACETAMINOPHEN-CAFF -40 MG TABLET    Take 1 tablet by mouth every 6 hours if needed    CHLORHEXIDINE (PERIDEX) 0.12 % SOLUTION    Use 15 mL in the mouth or throat if needed.    CHLORPROMAZINE (THORAZINE) 25 MG TABLET    Take 1 tablet (25 mg) by mouth 2 times a day as needed.    CHOLESTYRAMINE, BULK, MISC    Take 4 g by mouth every 12 hours.    CLARITHROMYCIN (BIAXIN) 500 MG TABLET    Take 1 tablet (500 mg) by mouth 2 times a day for 10 days. Take with food    DESVENLAFAXINE 100 MG 24 HR TABLET    Take 1 tablet (100 mg) by mouth once daily.    DULOXETINE (CYMBALTA) 30 MG DR CAPSULE    Take 1 Capsule by mouth once daily    FENOFIBRATE (TRIGLIDE) 160 MG TABLET    Take 1 tablet (160 mg) by mouth once daily.    GABAPENTIN (NEURONTIN) 600 MG TABLET    Take 1 tablet (600 mg) by mouth 3 times a day.    HYDROXYZINE HCL (ATARAX) 10 MG TABLET    Take 1 Tablet by mouth 3 (three) times daily as needed for anxiety    IBUPROFEN 600 MG  TABLET    Take 1 Tablet by mouth 3 (three) times daily as needed for pain, headaches or mild pain. Take with food    LAMOTRIGINE (LAMICTAL) 25 MG TABLET    Take 3 tablets by mouth once daily.    LEVOTHYROXINE (SYNTHROID, LEVOXYL) 150 MCG TABLET    Take 1 tablet (150 mcg) by mouth early in the morning..    LISINOPRIL 40 MG TABLET    Take 1 tablet (40 mg) by mouth once daily.    LOPERAMIDE (IMODIUM) 2 MG CAPSULE    Take 1 Capsule by mouth 2 (two) times daily as needed for diarrhea    MECLIZINE (ANTIVERT) 12.5 MG TABLET    Take 1 Tablet by mouth once daily as needed (dizzy) for up to 30 days    MIRTAZAPINE (REMERON) 15 MG TABLET    Take 1 tablet (15 mg) by mouth once daily at bedtime.    MOMETASONE-FORMOTEROL (DULERA 200) 200-5 MCG/ACTUATION INHALER    Inhale 2 puffs by mouth and into the lungs two times a day. Rinse mouth after each use    OMEGA 3-DHA-EPA-FISH  MG (120 MG- 180MG)-1,000 MG CAPSULE    Take 1 capsule by mouth 2 times a day.    OMEGA-3 FATTY ACIDS-FISH -1,000 MG CAPSULE    Take 1 capsule (1,000 mg) by mouth every 12 hours.    ONDANSETRON (ZOFRAN) 4 MG TABLET    Take 1 tablet (4 mg) by mouth every 8 hours if needed for nausea or vomiting.    ONDANSETRON (ZOFRAN) 4 MG TABLET    Take one tablet by mouth every 8 hours as needed for nausea for up to 30 days    ONDANSETRON ODT (ZOFRAN-ODT) 4 MG DISINTEGRATING TABLET    Dissolve 1 tablet (4 mg) in the mouth every 8 hours if needed for nausea.    ONETOUCH ULTRA TEST STRIP    Inject 1 strip as directed 3 times a day as needed.    OXCARBAZEPINE (TRILEPTAL) 300 MG TABLET    Take 1 tablet by mouth twice a day.    PANTOPRAZOLE (PROTONIX) 40 MG EC TABLET    Take 1 tablet (40 mg) by mouth once daily in the morning. Take before meals.    PRAZOSIN (MINIPRESS) 1 MG CAPSULE    TAKE 1 CAPSULE BY MOUTH AT BEDTIME FOR PTSD/NIGHTMARES/FLASHBACKS/HYPERVIGILANCE    SOD PICOSULF-MAG OX-CITRIC AC (CLENPIQ) 10 MG-3.5 GRAM- 12 GRAM/175 ML SOLUTION    Take as directed  night before and morning of colonoscopy    SUCRALFATE (CARAFATE) 1 GRAM TABLET    Take 1 tablet (1 g) by mouth 2 times a day before meals.    SUMATRIPTAN (IMITREX) 50 MG TABLET    Take 1 tablet (50 mg) by mouth every 2 hours if needed for migraine for up to 1 dose. May repeat dose once in 2 hours if no relief.  Do not exceed 2 doses in 24 hours.    SUMATRIPTAN (IMITREX) 50 MG TABLET    Take 1 Tablet by mouth 1 (one) time as needed for migraine for up to 1 dose May repeat dose 2 hours after    SUMATRIPTAN (IMITREX) 50 MG TABLET    Take 1 Tablet by mouth 1 (one) time as needed for migraine for up to 1 dose May repeat dose 2 hours after    SUMATRIPTAN (IMITREX) 50 MG TABLET    Take 1 Tablet by mouth 1 (one) time as needed for migraine for up to 1 dose May repeat dose 2 hours after    THIAMINE (VITAMIN B-1) 100 MG TABLET    Take 1 tablet (100 mg) by mouth once daily.    TRAZODONE (DESYREL) 100 MG TABLET    Take 1 tablet (100 mg) by mouth once daily at bedtime.    VILAZODONE (VIIBRYD) 40 MG TABLET    Take 1 Tablet by mouth once daily every evening    ZOLPIDEM (AMBIEN) 10 MG TABLET    TAKE 1 TABLET BY MOUTH AT BEDTIME AS NEEDED FOR SLEEP   Modified Medications    No medications on file   Discontinued Medications    No medications on file      The patient’s home medications have been reviewed.    Allergies: Codeine, Dulaglutide, Bee pollen, Ferrous sulfate, Oxycodone-acetaminophen, Penicillins, and Sulfa (sulfonamide antibiotics)    -------------------------------------------------- RESULTS -------------------------------------------------  All laboratory and radiology results have been personally reviewed by myself   LABS:  Labs Reviewed   DRUG SCREEN,URINE - Abnormal       Result Value    Amphetamine Screen, Urine Presumptive Negative      Barbiturate Screen, Urine Presumptive Positive (*)     Benzodiazepines Screen, Urine Presumptive Negative      Cannabinoid Screen, Urine Presumptive Negative      Cocaine Metabolite  Screen, Urine Presumptive Negative      Fentanyl Screen, Urine Presumptive Negative      Opiate Screen, Urine Presumptive Negative      Oxycodone Screen, Urine Presumptive Negative      PCP Screen, Urine Presumptive Negative      Methadone Screen, Urine Presumptive Negative      Narrative:     Drug screen results are presumptive and should not be used to assess   compliance with prescribed medication. Contact the performing [facility] [place] laboratory   to add-on definitive confirmatory testing if clinically indicated.    Toxicology screening results are reported qualitatively. The concentration must   be greater than or equal to the cutoff to be reported as positive. The concentration   at which the screening test can detect an individual drug or metabolite varies.   The absence of expected drug(s) and/or drug metabolite(s) may indicate non-compliance,   inappropriate timing of specimen collection relative to drug administration, poor drug   absorption, diluted/adulterated urine, or limitations of testing. For medical purposes   only; not valid for forensic use.    Interpretive questions should be directed to the laboratory medical directors.   COMPREHENSIVE METABOLIC PANEL - Abnormal    Glucose 100 (*)     Sodium 110 (*)     Potassium 3.3 (*)     Chloride 78 (*)     Bicarbonate 20 (*)     Anion Gap 15      Urea Nitrogen 9      Creatinine 0.62      eGFR >90      Calcium 9.5      Albumin 4.9      Alkaline Phosphatase 104      Total Protein 7.7      AST 16      Bilirubin, Total 0.5      ALT 15     CBC WITH AUTO DIFFERENTIAL - Abnormal    WBC 12.8 (*)     nRBC 0.0      RBC 4.55      Hemoglobin 14.2      Hematocrit 38.8      MCV 85      MCH 31.2      MCHC 36.6 (*)     RDW 12.7      Platelets 378      Neutrophils % 79.9      Immature Granulocytes %, Automated 0.5      Lymphocytes % 12.3      Monocytes % 5.2      Eosinophils % 1.6      Basophils % 0.5      Neutrophils Absolute 10.24 (*)     Immature Granulocytes Absolute, Automated  0.06      Lymphocytes Absolute 1.57      Monocytes Absolute 0.66      Eosinophils Absolute 0.21      Basophils Absolute 0.06     URINALYSIS WITH REFLEX MICROSCOPIC - Abnormal    Color, Urine Light-Yellow      Appearance, Urine Turbid (*)     Specific Gravity, Urine 1.012      pH, Urine 6.5      Protein, Urine NEGATIVE      Glucose, Urine Normal      Blood, Urine 0.03 (TRACE) (*)     Ketones, Urine NEGATIVE      Bilirubin, Urine NEGATIVE      Urobilinogen, Urine Normal      Nitrite, Urine 2+ (*)     Leukocyte Esterase, Urine 500 Marianne/uL (*)    MICROSCOPIC ONLY, URINE - Abnormal    WBC, Urine >50 (*)     WBC Clumps, Urine FEW      RBC, Urine 3-5      Squamous Epithelial Cells, Urine 1-9 (SPARSE)     ALCOHOL - Normal    Alcohol <10     PROTIME-INR - Normal    Protime 10.8      INR 1.0     LACTATE - Normal    Lactate 0.9      Narrative:     Venipuncture immediately after or during the administration of Metamizole may lead to falsely low results. Testing should be performed immediately prior to Metamizole dosing.   ACETAMINOPHEN - Normal    Acetaminophen <10.0     SALICYLATE - Normal    Salicylate  <3     SODIUM       EKG: Normal sinus rhythm at 78 bpm, normal axis, LVH by voltage, incomplete right bundle branch block, QTc prolongation at 4 94 ms, no acute ST elevations.  Interpreted by DEEPAK Lantigua MD    RADIOLOGY:  Interpreted by Radiologist.  No orders to display       ------------------------- NURSING NOTES AND VITALS REVIEWED ---------------------------   The nursing notes within the ED encounter and vital signs as below have been reviewed.   LMP  (LMP Unknown)   Oxygen Saturation Interpretation: Normal      ---------------------------------------------------PHYSICAL EXAM--------------------------------------  Physical Exam   Constitutional/General: Alert and oriented x2, well appearing, non toxic in NAD  Head: Normocephalic and atraumatic  Eyes: PERRL, EOMI, conjunctiva normal, sclera non icteric  Mouth: Oropharynx  clear, handling secretions, no trismus, no asymmetry of the posterior oropharynx or uvular edema  Neck: Supple, full ROM, non tender to palpation in the midline, no stridor, no crepitus, no meningeal signs  Respiratory: Lungs clear to auscultation bilaterally, no wheezes, rales, or rhonchi  Cardiovascular:  Regular rate. Regular rhythm. No murmurs, gallops, or rubs. 2+ distal pulses  Chest: No chest wall tenderness  GI:  Abdomen Soft, Non tender, Non distended.  +BS. No organomegaly, no palpable masses,  No rebound, guarding, or rigidity. No CVAT   Musculoskeletal: Moves all extremities x 4. Warm and well perfused, no clubbing, cyanosis, or edema. Capillary refill <3 seconds  Integument: skin warm and dry. No rashes.   Lymphatic: no lymphadenopathy noted  Neurologic:  No focal deficits, symmetric strength 5/5 in the upper and lower extremities bilaterally  Psychiatric: Normal Affect, relatively slow to respond.  Unable to answer questions such as what medications she took or when she took them.  She does not know if they have prescription or over-the-counter medications.  She has no idea how many pills she took.  She states she is just trying to get some sleep and quiet the voices in her head.  She denies any suicidal intent.    Procedures    Diagnoses as of 03/20/25 0821   Hyponatremia   Hypokalemia   Acute cystitis without hematuria          Medical Decision Making:   Patient initial sodium was 110.  Repeat sodium level was 108.  I discussed this case with Dr. Betts from medical toxicology.  The patient is not taking lithium.  There are no special toxicological treatment plans for the hyponatremia.  I have consulted the midlevel provider here at [place].  The patient will be transferred for further care and treatment.  She is being  treated in the ED with 150 cc of hypertonic saline and a BMP will be repeated after the infusion has finished.      Counseling:   The emergency provider has spoken with the patient  and discussed today’s results, in addition to providing specific details for the plan of care and counseling regarding the diagnosis and prognosis.  Questions are answered at this time and they are agreeable with the plan.      --------------------------------- IMPRESSION AND DISPOSITION ---------------------------------        IMPRESSION  No diagnosis found.    DISPOSITION  Disposition: Transfer to [facility] to Dr. Sylvester  Patient condition is serious      Billing Provider Critical Care Time: 0 minutes     Pee Lantigua MD  03/20/25 0809     Yes

## 2025-03-18 ENCOUNTER — HOSPITAL ENCOUNTER (INPATIENT)
Facility: HOSPITAL | Age: 60
LOS: 4 days | Discharge: HOME | End: 2025-03-22
Attending: INTERNAL MEDICINE | Admitting: INTERNAL MEDICINE
Payer: MEDICAID

## 2025-03-18 ENCOUNTER — APPOINTMENT (OUTPATIENT)
Dept: CARDIOLOGY | Facility: HOSPITAL | Age: 60
End: 2025-03-18
Payer: MEDICAID

## 2025-03-18 VITALS
TEMPERATURE: 97 F | BODY MASS INDEX: 32.13 KG/M2 | DIASTOLIC BLOOD PRESSURE: 78 MMHG | SYSTOLIC BLOOD PRESSURE: 118 MMHG | RESPIRATION RATE: 13 BRPM | WEIGHT: 216.93 LBS | HEART RATE: 64 BPM | OXYGEN SATURATION: 99 % | HEIGHT: 69 IN

## 2025-03-18 DIAGNOSIS — R56.9 SEIZURE (MULTI): ICD-10-CM

## 2025-03-18 DIAGNOSIS — E03.9 HYPOTHYROIDISM, UNSPECIFIED TYPE: ICD-10-CM

## 2025-03-18 DIAGNOSIS — E03.9 ACQUIRED HYPOTHYROIDISM: ICD-10-CM

## 2025-03-18 DIAGNOSIS — E87.1 HYPONATREMIA: Primary | ICD-10-CM

## 2025-03-18 LAB
ALBUMIN SERPL BCP-MCNC: 3.9 G/DL (ref 3.4–5)
ALBUMIN SERPL BCP-MCNC: 4 G/DL (ref 3.4–5)
ALBUMIN SERPL BCP-MCNC: 4.1 G/DL (ref 3.4–5)
ALBUMIN SERPL BCP-MCNC: 4.1 G/DL (ref 3.4–5)
ANION GAP SERPL CALC-SCNC: 11 MMOL/L (ref 10–20)
ANION GAP SERPL CALC-SCNC: 12 MMOL/L (ref 10–20)
ANION GAP SERPL CALC-SCNC: 13 MMOL/L (ref 10–20)
ATRIAL RATE: 68 BPM
BUN SERPL-MCNC: 8 MG/DL (ref 6–23)
BUN SERPL-MCNC: 9 MG/DL (ref 6–23)
CALCIUM SERPL-MCNC: 8 MG/DL (ref 8.6–10.3)
CALCIUM SERPL-MCNC: 8 MG/DL (ref 8.6–10.3)
CALCIUM SERPL-MCNC: 8.1 MG/DL (ref 8.6–10.3)
CALCIUM SERPL-MCNC: 8.2 MG/DL (ref 8.6–10.3)
CALCIUM SERPL-MCNC: 8.3 MG/DL (ref 8.6–10.3)
CHLORIDE SERPL-SCNC: 83 MMOL/L (ref 98–107)
CHLORIDE SERPL-SCNC: 84 MMOL/L (ref 98–107)
CHLORIDE SERPL-SCNC: 85 MMOL/L (ref 98–107)
CHLORIDE UR-SCNC: 46 MMOL/L
CHLORIDE UR-SCNC: 75 MMOL/L
CHLORIDE/CREATININE (MMOL/G) IN URINE: 130 MMOL/G CREAT (ref 38–318)
CHLORIDE/CREATININE (MMOL/G) IN URINE: 58 MMOL/G CREAT (ref 38–318)
CO2 SERPL-SCNC: 20 MMOL/L (ref 21–32)
CO2 SERPL-SCNC: 21 MMOL/L (ref 21–32)
CO2 SERPL-SCNC: 21 MMOL/L (ref 21–32)
CO2 SERPL-SCNC: 22 MMOL/L (ref 21–32)
CO2 SERPL-SCNC: 24 MMOL/L (ref 21–32)
CREAT SERPL-MCNC: 0.49 MG/DL (ref 0.5–1.05)
CREAT SERPL-MCNC: 0.55 MG/DL (ref 0.5–1.05)
CREAT SERPL-MCNC: 0.62 MG/DL (ref 0.5–1.05)
CREAT SERPL-MCNC: 0.63 MG/DL (ref 0.5–1.05)
CREAT SERPL-MCNC: 0.74 MG/DL (ref 0.5–1.05)
CREAT UR-MCNC: 57.9 MG/DL (ref 20–320)
CREAT UR-MCNC: 78.7 MG/DL (ref 20–320)
CREAT UR-MCNC: 78.7 MG/DL (ref 20–320)
EGFRCR SERPLBLD CKD-EPI 2021: >90 ML/MIN/1.73M*2
GLUCOSE SERPL-MCNC: 123 MG/DL (ref 74–99)
GLUCOSE SERPL-MCNC: 91 MG/DL (ref 74–99)
GLUCOSE SERPL-MCNC: 94 MG/DL (ref 74–99)
GLUCOSE SERPL-MCNC: 97 MG/DL (ref 74–99)
GLUCOSE SERPL-MCNC: 98 MG/DL (ref 74–99)
LACTATE SERPL-SCNC: 0.8 MMOL/L (ref 0.4–2)
MICROALBUMIN UR-MCNC: 101.1 MG/L
MICROALBUMIN/CREAT UR: 128.5 UG/MG CREAT
OSMOLALITY SERPL: 232 MOSM/KG (ref 280–300)
OSMOLALITY SERPL: 245 MOSM/KG (ref 280–300)
OSMOLALITY UR: 390 MOSM/KG (ref 200–1200)
OSMOLALITY UR: 422 MOSM/KG (ref 200–1200)
P AXIS: 68 DEGREES
P OFFSET: 182 MS
P ONSET: 115 MS
PHOSPHATE SERPL-MCNC: 2.4 MG/DL (ref 2.5–4.9)
PHOSPHATE SERPL-MCNC: 2.5 MG/DL (ref 2.5–4.9)
PHOSPHATE SERPL-MCNC: 2.9 MG/DL (ref 2.5–4.9)
PHOSPHATE SERPL-MCNC: 3.3 MG/DL (ref 2.5–4.9)
POTASSIUM SERPL-SCNC: 2.8 MMOL/L (ref 3.5–5.3)
POTASSIUM SERPL-SCNC: 3.1 MMOL/L (ref 3.5–5.3)
POTASSIUM SERPL-SCNC: 3.2 MMOL/L (ref 3.5–5.3)
POTASSIUM SERPL-SCNC: 3.5 MMOL/L (ref 3.5–5.3)
POTASSIUM SERPL-SCNC: 3.9 MMOL/L (ref 3.5–5.3)
POTASSIUM UR-SCNC: 36 MMOL/L
POTASSIUM UR-SCNC: 47 MMOL/L
POTASSIUM/CREAT UR-RTO: 46 MMOL/G CREAT
POTASSIUM/CREAT UR-RTO: 81 MMOL/G CREAT
PR INTERVAL: 218 MS
Q ONSET: 224 MS
QRS COUNT: 11 BEATS
QRS DURATION: 96 MS
QT INTERVAL: 474 MS
QTC CALCULATION(BAZETT): 504 MS
QTC FREDERICIA: 494 MS
R AXIS: 28 DEGREES
SODIUM SERPL-SCNC: 112 MMOL/L (ref 136–145)
SODIUM SERPL-SCNC: 113 MMOL/L (ref 136–145)
SODIUM SERPL-SCNC: 114 MMOL/L (ref 136–145)
SODIUM SERPL-SCNC: 114 MMOL/L (ref 136–145)
SODIUM SERPL-SCNC: 116 MMOL/L (ref 136–145)
SODIUM UR-SCNC: 39 MMOL/L
SODIUM UR-SCNC: 57 MMOL/L
SODIUM/CREAT UR-RTO: 50 MMOL/G CREAT
SODIUM/CREAT UR-RTO: 98 MMOL/G CREAT
T AXIS: 66 DEGREES
T OFFSET: 461 MS
VENTRICULAR RATE: 68 BPM

## 2025-03-18 PROCEDURE — 2500000004 HC RX 250 GENERAL PHARMACY W/ HCPCS (ALT 636 FOR OP/ED)

## 2025-03-18 PROCEDURE — 2500000002 HC RX 250 W HCPCS SELF ADMINISTERED DRUGS (ALT 637 FOR MEDICARE OP, ALT 636 FOR OP/ED)

## 2025-03-18 PROCEDURE — 94664 DEMO&/EVAL PT USE INHALER: CPT

## 2025-03-18 PROCEDURE — 36415 COLL VENOUS BLD VENIPUNCTURE: CPT | Performed by: EMERGENCY MEDICINE

## 2025-03-18 PROCEDURE — 2500000001 HC RX 250 WO HCPCS SELF ADMINISTERED DRUGS (ALT 637 FOR MEDICARE OP)

## 2025-03-18 PROCEDURE — 2020000001 HC ICU ROOM DAILY

## 2025-03-18 PROCEDURE — 9420000001 HC RT PATIENT EDUCATION 5 MIN

## 2025-03-18 PROCEDURE — 80048 BASIC METABOLIC PNL TOTAL CA: CPT

## 2025-03-18 PROCEDURE — 83930 ASSAY OF BLOOD OSMOLALITY: CPT | Mod: GEALAB

## 2025-03-18 PROCEDURE — 82043 UR ALBUMIN QUANTITATIVE: CPT | Mod: GEALAB

## 2025-03-18 PROCEDURE — 2500000002 HC RX 250 W HCPCS SELF ADMINISTERED DRUGS (ALT 637 FOR MEDICARE OP, ALT 636 FOR OP/ED): Performed by: INTERNAL MEDICINE

## 2025-03-18 PROCEDURE — 94760 N-INVAS EAR/PLS OXIMETRY 1: CPT

## 2025-03-18 PROCEDURE — 80069 RENAL FUNCTION PANEL: CPT | Performed by: EMERGENCY MEDICINE

## 2025-03-18 PROCEDURE — 94640 AIRWAY INHALATION TREATMENT: CPT

## 2025-03-18 PROCEDURE — 84100 ASSAY OF PHOSPHORUS: CPT

## 2025-03-18 PROCEDURE — 84300 ASSAY OF URINE SODIUM: CPT | Mod: GEALAB

## 2025-03-18 PROCEDURE — 83605 ASSAY OF LACTIC ACID: CPT

## 2025-03-18 PROCEDURE — 99291 CRITICAL CARE FIRST HOUR: CPT | Performed by: INTERNAL MEDICINE

## 2025-03-18 PROCEDURE — 93005 ELECTROCARDIOGRAM TRACING: CPT

## 2025-03-18 PROCEDURE — 36415 COLL VENOUS BLD VENIPUNCTURE: CPT

## 2025-03-18 PROCEDURE — 83935 ASSAY OF URINE OSMOLALITY: CPT | Mod: GEALAB

## 2025-03-18 RX ORDER — LANOLIN ALCOHOL/MO/W.PET/CERES
100 CREAM (GRAM) TOPICAL DAILY
Status: DISCONTINUED | OUTPATIENT
Start: 2025-03-18 | End: 2025-03-22 | Stop reason: HOSPADM

## 2025-03-18 RX ORDER — DULOXETIN HYDROCHLORIDE 30 MG/1
30 CAPSULE, DELAYED RELEASE ORAL DAILY
Status: DISCONTINUED | OUTPATIENT
Start: 2025-03-18 | End: 2025-03-20

## 2025-03-18 RX ORDER — ACETAMINOPHEN 325 MG/1
975 TABLET ORAL EVERY 8 HOURS PRN
Status: DISCONTINUED | OUTPATIENT
Start: 2025-03-18 | End: 2025-03-19

## 2025-03-18 RX ORDER — ENOXAPARIN SODIUM 100 MG/ML
40 INJECTION SUBCUTANEOUS EVERY 24 HOURS
Status: DISCONTINUED | OUTPATIENT
Start: 2025-03-18 | End: 2025-03-22 | Stop reason: HOSPADM

## 2025-03-18 RX ORDER — BUDESONIDE 0.5 MG/2ML
0.5 INHALANT ORAL
Status: DISCONTINUED | OUTPATIENT
Start: 2025-03-18 | End: 2025-03-22 | Stop reason: HOSPADM

## 2025-03-18 RX ORDER — MECLIZINE HCL 12.5 MG 12.5 MG/1
12.5 TABLET ORAL 3 TIMES DAILY PRN
Status: DISCONTINUED | OUTPATIENT
Start: 2025-03-18 | End: 2025-03-22 | Stop reason: HOSPADM

## 2025-03-18 RX ORDER — PANTOPRAZOLE SODIUM 40 MG/1
40 TABLET, DELAYED RELEASE ORAL
Status: DISCONTINUED | OUTPATIENT
Start: 2025-03-18 | End: 2025-03-22 | Stop reason: HOSPADM

## 2025-03-18 RX ORDER — MIRTAZAPINE 15 MG/1
15 TABLET, FILM COATED ORAL NIGHTLY
Status: DISCONTINUED | OUTPATIENT
Start: 2025-03-18 | End: 2025-03-22 | Stop reason: HOSPADM

## 2025-03-18 RX ORDER — POTASSIUM CHLORIDE 20 MEQ/1
40 TABLET, EXTENDED RELEASE ORAL ONCE
Status: COMPLETED | OUTPATIENT
Start: 2025-03-18 | End: 2025-03-18

## 2025-03-18 RX ORDER — GABAPENTIN 300 MG/1
600 CAPSULE ORAL 3 TIMES DAILY
Status: DISCONTINUED | OUTPATIENT
Start: 2025-03-18 | End: 2025-03-22 | Stop reason: HOSPADM

## 2025-03-18 RX ORDER — AMLODIPINE BESYLATE 10 MG/1
10 TABLET ORAL DAILY
Status: DISCONTINUED | OUTPATIENT
Start: 2025-03-18 | End: 2025-03-22 | Stop reason: HOSPADM

## 2025-03-18 RX ORDER — FORMOTEROL FUMARATE 20 UG/2ML
20 SOLUTION RESPIRATORY (INHALATION)
Status: DISCONTINUED | OUTPATIENT
Start: 2025-03-18 | End: 2025-03-22 | Stop reason: HOSPADM

## 2025-03-18 RX ORDER — DESVENLAFAXINE 100 MG/1
100 TABLET, EXTENDED RELEASE ORAL DAILY
Status: DISCONTINUED | OUTPATIENT
Start: 2025-03-18 | End: 2025-03-22 | Stop reason: HOSPADM

## 2025-03-18 RX ORDER — TRAZODONE HYDROCHLORIDE 50 MG/1
100 TABLET ORAL NIGHTLY
Status: DISCONTINUED | OUTPATIENT
Start: 2025-03-18 | End: 2025-03-19

## 2025-03-18 RX ORDER — OXCARBAZEPINE 150 MG/1
300 TABLET, FILM COATED ORAL 2 TIMES DAILY
Status: DISCONTINUED | OUTPATIENT
Start: 2025-03-18 | End: 2025-03-20

## 2025-03-18 RX ORDER — FLUTICASONE FUROATE AND VILANTEROL 200; 25 UG/1; UG/1
1 POWDER RESPIRATORY (INHALATION) DAILY
Status: DISCONTINUED | OUTPATIENT
Start: 2025-03-18 | End: 2025-03-18 | Stop reason: CLARIF

## 2025-03-18 RX ORDER — PRAZOSIN HYDROCHLORIDE 1 MG/1
1 CAPSULE ORAL NIGHTLY
Status: DISCONTINUED | OUTPATIENT
Start: 2025-03-18 | End: 2025-03-22 | Stop reason: HOSPADM

## 2025-03-18 RX ORDER — LISINOPRIL 20 MG/1
40 TABLET ORAL DAILY
Status: DISCONTINUED | OUTPATIENT
Start: 2025-03-18 | End: 2025-03-22 | Stop reason: HOSPADM

## 2025-03-18 RX ORDER — LAMOTRIGINE 25 MG/1
75 TABLET ORAL DAILY
Status: DISCONTINUED | OUTPATIENT
Start: 2025-03-18 | End: 2025-03-20

## 2025-03-18 RX ORDER — POTASSIUM CHLORIDE 14.9 MG/ML
20 INJECTION INTRAVENOUS
Status: COMPLETED | OUTPATIENT
Start: 2025-03-18 | End: 2025-03-18

## 2025-03-18 RX ORDER — NITROFURANTOIN 25; 75 MG/1; MG/1
100 CAPSULE ORAL EVERY 12 HOURS SCHEDULED
Status: DISCONTINUED | OUTPATIENT
Start: 2025-03-18 | End: 2025-03-21

## 2025-03-18 RX ORDER — ALBUTEROL SULFATE 90 UG/1
2 INHALANT RESPIRATORY (INHALATION) EVERY 6 HOURS PRN
Status: DISCONTINUED | OUTPATIENT
Start: 2025-03-18 | End: 2025-03-22 | Stop reason: HOSPADM

## 2025-03-18 RX ORDER — IPRATROPIUM BROMIDE 0.5 MG/2.5ML
0.5 SOLUTION RESPIRATORY (INHALATION) EVERY 6 HOURS PRN
Status: DISCONTINUED | OUTPATIENT
Start: 2025-03-18 | End: 2025-03-18 | Stop reason: CLARIF

## 2025-03-18 RX ORDER — FENOFIBRATE 160 MG/1
160 TABLET ORAL DAILY
Status: DISCONTINUED | OUTPATIENT
Start: 2025-03-18 | End: 2025-03-22 | Stop reason: HOSPADM

## 2025-03-18 RX ORDER — VILAZODONE HYDROCHLORIDE 20 MG/1
40 TABLET ORAL NIGHTLY
Status: DISCONTINUED | OUTPATIENT
Start: 2025-03-18 | End: 2025-03-19

## 2025-03-18 RX ORDER — FLUTICASONE FUROATE AND VILANTEROL 200; 25 UG/1; UG/1
1 POWDER RESPIRATORY (INHALATION)
Status: DISCONTINUED | OUTPATIENT
Start: 2025-03-18 | End: 2025-03-18 | Stop reason: CLARIF

## 2025-03-18 RX ORDER — BUSPIRONE HYDROCHLORIDE 15 MG/1
15 TABLET ORAL 2 TIMES DAILY
Status: DISCONTINUED | OUTPATIENT
Start: 2025-03-18 | End: 2025-03-22 | Stop reason: HOSPADM

## 2025-03-18 RX ORDER — LEVOTHYROXINE SODIUM 75 UG/1
150 TABLET ORAL DAILY
Status: DISCONTINUED | OUTPATIENT
Start: 2025-03-18 | End: 2025-03-22 | Stop reason: HOSPADM

## 2025-03-18 RX ADMIN — FORMOTEROL FUMARATE DIHYDRATE 20 MCG: 20 SOLUTION RESPIRATORY (INHALATION) at 19:48

## 2025-03-18 RX ADMIN — GABAPENTIN 600 MG: 300 CAPSULE ORAL at 21:00

## 2025-03-18 RX ADMIN — POTASSIUM CHLORIDE 20 MEQ: 200 INJECTION, SOLUTION INTRAVENOUS at 16:34

## 2025-03-18 RX ADMIN — TRAZODONE HYDROCHLORIDE 100 MG: 50 TABLET ORAL at 21:00

## 2025-03-18 RX ADMIN — PRAZOSIN HYDROCHLORIDE 1 MG: 1 CAPSULE ORAL at 21:00

## 2025-03-18 RX ADMIN — ACETAMINOPHEN 975 MG: 325 TABLET ORAL at 20:55

## 2025-03-18 RX ADMIN — POTASSIUM CHLORIDE 40 MEQ: 1500 TABLET, EXTENDED RELEASE ORAL at 16:34

## 2025-03-18 RX ADMIN — NITROFURANTOIN MONOHYDRATE/MACROCRYSTALS 100 MG: 25; 75 CAPSULE ORAL at 21:00

## 2025-03-18 RX ADMIN — BUSPIRONE HYDROCHLORIDE 15 MG: 15 TABLET ORAL at 21:00

## 2025-03-18 RX ADMIN — GABAPENTIN 600 MG: 300 CAPSULE ORAL at 14:56

## 2025-03-18 RX ADMIN — ACETAMINOPHEN 975 MG: 325 TABLET ORAL at 13:19

## 2025-03-18 RX ADMIN — OXCARBAZEPINE 300 MG: 150 TABLET, FILM COATED ORAL at 21:00

## 2025-03-18 RX ADMIN — ENOXAPARIN SODIUM 40 MG: 40 INJECTION SUBCUTANEOUS at 09:13

## 2025-03-18 RX ADMIN — POTASSIUM CHLORIDE 20 MEQ: 200 INJECTION, SOLUTION INTRAVENOUS at 18:26

## 2025-03-18 RX ADMIN — VILAZODONE HYDROCHLORIDE 40 MG: 20 TABLET ORAL at 21:00

## 2025-03-18 RX ADMIN — NITROFURANTOIN MONOHYDRATE/MACROCRYSTALS 100 MG: 25; 75 CAPSULE ORAL at 13:19

## 2025-03-18 RX ADMIN — MIRTAZAPINE 15 MG: 15 TABLET, FILM COATED ORAL at 21:00

## 2025-03-18 RX ADMIN — BUDESONIDE 0.5 MG: 0.5 INHALANT RESPIRATORY (INHALATION) at 19:51

## 2025-03-18 SDOH — SOCIAL STABILITY: SOCIAL INSECURITY: ARE THERE ANY APPARENT SIGNS OF INJURIES/BEHAVIORS THAT COULD BE RELATED TO ABUSE/NEGLECT?: NO

## 2025-03-18 SDOH — SOCIAL STABILITY: SOCIAL INSECURITY: WITHIN THE LAST YEAR, HAVE YOU BEEN AFRAID OF YOUR PARTNER OR EX-PARTNER?: NO

## 2025-03-18 SDOH — SOCIAL STABILITY: SOCIAL INSECURITY
WITHIN THE LAST YEAR, HAVE YOU BEEN RAPED OR FORCED TO HAVE ANY KIND OF SEXUAL ACTIVITY BY YOUR PARTNER OR EX-PARTNER?: NO

## 2025-03-18 SDOH — HEALTH STABILITY: PHYSICAL HEALTH
ON AVERAGE, HOW MANY DAYS PER WEEK DO YOU ENGAGE IN MODERATE TO STRENUOUS EXERCISE (LIKE A BRISK WALK)?: PATIENT DECLINED

## 2025-03-18 SDOH — SOCIAL STABILITY: SOCIAL INSECURITY: ABUSE: ADULT

## 2025-03-18 SDOH — ECONOMIC STABILITY: FOOD INSECURITY
WITHIN THE PAST 12 MONTHS, YOU WORRIED THAT YOUR FOOD WOULD RUN OUT BEFORE YOU GOT THE MONEY TO BUY MORE.: PATIENT DECLINED

## 2025-03-18 SDOH — ECONOMIC STABILITY: FOOD INSECURITY: WITHIN THE PAST 12 MONTHS, THE FOOD YOU BOUGHT JUST DIDN'T LAST AND YOU DIDN'T HAVE MONEY TO GET MORE.: PATIENT DECLINED

## 2025-03-18 SDOH — ECONOMIC STABILITY: INCOME INSECURITY
IN THE PAST 12 MONTHS HAS THE ELECTRIC, GAS, OIL, OR WATER COMPANY THREATENED TO SHUT OFF SERVICES IN YOUR HOME?: PATIENT DECLINED

## 2025-03-18 SDOH — SOCIAL STABILITY: SOCIAL INSECURITY: ARE YOU OR HAVE YOU BEEN THREATENED OR ABUSED PHYSICALLY, EMOTIONALLY, OR SEXUALLY BY ANYONE?: NO

## 2025-03-18 SDOH — SOCIAL STABILITY: SOCIAL INSECURITY: DO YOU FEEL UNSAFE GOING BACK TO THE PLACE WHERE YOU ARE LIVING?: NO

## 2025-03-18 SDOH — ECONOMIC STABILITY: HOUSING INSECURITY: AT ANY TIME IN THE PAST 12 MONTHS, WERE YOU HOMELESS OR LIVING IN A SHELTER (INCLUDING NOW)?: PATIENT DECLINED

## 2025-03-18 SDOH — ECONOMIC STABILITY: FOOD INSECURITY: HOW HARD IS IT FOR YOU TO PAY FOR THE VERY BASICS LIKE FOOD, HOUSING, MEDICAL CARE, AND HEATING?: PATIENT DECLINED

## 2025-03-18 SDOH — SOCIAL STABILITY: SOCIAL INSECURITY: WITHIN THE LAST YEAR, HAVE YOU BEEN HUMILIATED OR EMOTIONALLY ABUSED IN OTHER WAYS BY YOUR PARTNER OR EX-PARTNER?: NO

## 2025-03-18 SDOH — SOCIAL STABILITY: SOCIAL INSECURITY: HAVE YOU HAD ANY THOUGHTS OF HARMING ANYONE ELSE?: NO

## 2025-03-18 SDOH — ECONOMIC STABILITY: TRANSPORTATION INSECURITY
IN THE PAST 12 MONTHS, HAS LACK OF TRANSPORTATION KEPT YOU FROM MEDICAL APPOINTMENTS OR FROM GETTING MEDICATIONS?: PATIENT DECLINED

## 2025-03-18 SDOH — SOCIAL STABILITY: SOCIAL INSECURITY: DOES ANYONE TRY TO KEEP YOU FROM HAVING/CONTACTING OTHER FRIENDS OR DOING THINGS OUTSIDE YOUR HOME?: NO

## 2025-03-18 SDOH — SOCIAL STABILITY: SOCIAL INSECURITY: DO YOU FEEL ANYONE HAS EXPLOITED OR TAKEN ADVANTAGE OF YOU FINANCIALLY OR OF YOUR PERSONAL PROPERTY?: NO

## 2025-03-18 SDOH — ECONOMIC STABILITY: HOUSING INSECURITY: IN THE LAST 12 MONTHS, WAS THERE A TIME WHEN YOU WERE NOT ABLE TO PAY THE MORTGAGE OR RENT ON TIME?: PATIENT DECLINED

## 2025-03-18 SDOH — SOCIAL STABILITY: SOCIAL INSECURITY: WERE YOU ABLE TO COMPLETE ALL THE BEHAVIORAL HEALTH SCREENINGS?: YES

## 2025-03-18 SDOH — HEALTH STABILITY: PHYSICAL HEALTH: ON AVERAGE, HOW MANY MINUTES DO YOU ENGAGE IN EXERCISE AT THIS LEVEL?: PATIENT DECLINED

## 2025-03-18 SDOH — ECONOMIC STABILITY: HOUSING INSECURITY: IN THE PAST 12 MONTHS, HOW MANY TIMES HAVE YOU MOVED WHERE YOU WERE LIVING?: 0

## 2025-03-18 SDOH — SOCIAL STABILITY: SOCIAL INSECURITY: HAVE YOU HAD THOUGHTS OF HARMING ANYONE ELSE?: NO

## 2025-03-18 SDOH — SOCIAL STABILITY: SOCIAL INSECURITY: HAS ANYONE EVER THREATENED TO HURT YOUR FAMILY OR YOUR PETS?: NO

## 2025-03-18 ASSESSMENT — ENCOUNTER SYMPTOMS
SEIZURES: 1
APPETITE CHANGE: 0
RESPIRATORY NEGATIVE: 1
DIAPHORESIS: 0
TREMORS: 0
DYSURIA: 1
BACK PAIN: 1
HALLUCINATIONS: 0
AGITATION: 1
GASTROINTESTINAL NEGATIVE: 1
CHILLS: 0
FLANK PAIN: 0
CONFUSION: 1
FATIGUE: 1
ACTIVITY CHANGE: 0
ARTHRALGIAS: 1
SLEEP DISTURBANCE: 1
CARDIOVASCULAR NEGATIVE: 1
HEADACHES: 1

## 2025-03-18 ASSESSMENT — COGNITIVE AND FUNCTIONAL STATUS - GENERAL
HELP NEEDED FOR BATHING: A LITTLE
PERSONAL GROOMING: A LITTLE
DRESSING REGULAR LOWER BODY CLOTHING: A LITTLE
STANDING UP FROM CHAIR USING ARMS: A LITTLE
MOVING TO AND FROM BED TO CHAIR: A LITTLE
PATIENT BASELINE BEDBOUND: NO
CLIMB 3 TO 5 STEPS WITH RAILING: A LITTLE
EATING MEALS: A LITTLE
TOILETING: A LITTLE
WALKING IN HOSPITAL ROOM: A LITTLE
MOBILITY SCORE: 20
DRESSING REGULAR UPPER BODY CLOTHING: A LITTLE
DAILY ACTIVITIY SCORE: 18

## 2025-03-18 ASSESSMENT — PAIN SCALES - GENERAL
PAINLEVEL_OUTOF10: 0 - NO PAIN
PAINLEVEL_OUTOF10: 1

## 2025-03-18 ASSESSMENT — ACTIVITIES OF DAILY LIVING (ADL)
LACK_OF_TRANSPORTATION: PATIENT DECLINED
DRESSING YOURSELF: INDEPENDENT
LACK_OF_TRANSPORTATION: PATIENT DECLINED
LACK_OF_TRANSPORTATION: NO
GROOMING: INDEPENDENT
WALKS IN HOME: NEEDS ASSISTANCE
FEEDING YOURSELF: INDEPENDENT
JUDGMENT_ADEQUATE_SAFELY_COMPLETE_DAILY_ACTIVITIES: NO
ADEQUATE_TO_COMPLETE_ADL: YES
HEARING - RIGHT EAR: FUNCTIONAL
TOILETING: NEEDS ASSISTANCE
PATIENT'S MEMORY ADEQUATE TO SAFELY COMPLETE DAILY ACTIVITIES?: YES
BATHING: NEEDS ASSISTANCE
HEARING - LEFT EAR: FUNCTIONAL

## 2025-03-18 ASSESSMENT — COLUMBIA-SUICIDE SEVERITY RATING SCALE - C-SSRS
6. HAVE YOU EVER DONE ANYTHING, STARTED TO DO ANYTHING, OR PREPARED TO DO ANYTHING TO END YOUR LIFE?: NO
2. HAVE YOU ACTUALLY HAD ANY THOUGHTS OF KILLING YOURSELF?: NO
1. IN THE PAST MONTH, HAVE YOU WISHED YOU WERE DEAD OR WISHED YOU COULD GO TO SLEEP AND NOT WAKE UP?: NO

## 2025-03-18 ASSESSMENT — LIFESTYLE VARIABLES
HOW OFTEN DO YOU HAVE 6 OR MORE DRINKS ON ONE OCCASION: NEVER
HOW OFTEN DO YOU HAVE A DRINK CONTAINING ALCOHOL: MONTHLY OR LESS
HOW MANY STANDARD DRINKS CONTAINING ALCOHOL DO YOU HAVE ON A TYPICAL DAY: 1 OR 2
SKIP TO QUESTIONS 9-10: 1
AUDIT-C TOTAL SCORE: 1
AUDIT-C TOTAL SCORE: 1

## 2025-03-18 ASSESSMENT — PATIENT HEALTH QUESTIONNAIRE - PHQ9
SUM OF ALL RESPONSES TO PHQ9 QUESTIONS 1 & 2: 0
1. LITTLE INTEREST OR PLEASURE IN DOING THINGS: NOT AT ALL
2. FEELING DOWN, DEPRESSED OR HOPELESS: NOT AT ALL

## 2025-03-18 ASSESSMENT — PAIN - FUNCTIONAL ASSESSMENT
PAIN_FUNCTIONAL_ASSESSMENT: 0-10

## 2025-03-18 ASSESSMENT — PAIN DESCRIPTION - LOCATION: LOCATION: HEAD

## 2025-03-18 NOTE — PROGRESS NOTES
Emergency Medicine Transition of Care Note.    I received Erica Machado in signout from Dr. Neeraj Lantigua.  Please see the previous ED provider note for all HPI, PE and MDM up to the time of signout at 2300 on 3/17/25. This is in addition to the primary record.    In brief Erica Machado is an 59 y.o. female presenting for   Chief Complaint   Patient presents with    Drug Overdose     At the time of signout we were awaiting:   TRANSFER to ICU At [facility] for Hyponatremia     Erica Machado is a 59 y.o. female presenting to the ED for possible overdose, beginning unknown.  The complaint has been isolated, moderate in severity, and worsened by nothing.  Patient states she took several pills in attempt to help her sleep.  She does not know which pills she took she has no other prescription meds or over-the-counter meds.  She has no when she took them.  She denies any thoughts of hurting herself.  History of bipolar and schizoaffective disorder and is on long-acting Abilify.  Space she denies headache chest pain shortness of breath nausea vomiting or diarrhea.  She states she was just trying to sleep because her mind will not turn off.     Medical Decision Making:   Patient initial sodium was 110.  Repeat sodium level was 108.  I discussed this case with Dr. Betts from medical toxicology.  The patient is not taking lithium.  There are no special toxicological treatment plans for the hyponatremia.  I have consulted the midlevel provider here at [place].  The patient will be transferred for further care and treatment.  She is being  treated in the ED with 150 cc of hypertonic saline and a BMP will be repeated after the infusion has finished.    Patient is awaiting transfer to [facility] ICU for her Hyponatremia.  Potassium was treated with IVPB KCL.          Diagnoses as of 03/18/25 0355   Hyponatremia   Hypokalemia   Acute cystitis without hematuria       Medical Decision Making  Patient had some  3% hypertonic saline  Currently has NS at 100 ml/hour  Is scheduled for transfer at 0630 to [facility].     We will continue to monitor closely.    I have ordered a repeat BMP.    Patient did receive a single dose of Rocephin last evening for UTI.        Final diagnoses:   [E87.1] Hyponatremia   [E87.6] Hypokalemia   [N30.00] Acute cystitis without hematuria           Procedure  Procedures    Ronaldo Garcia, DO

## 2025-03-18 NOTE — ED NOTES
[facility] ICU Bed 7 [phone] no ETA     Edilberto Keith, LUZ  03/17/25 7575

## 2025-03-18 NOTE — ED NOTES
[facility] ICU [facility] 7 [phone]     Edilberto Keith, LUZ  03/17/25 3156

## 2025-03-18 NOTE — ED NOTES
Pt was visualized on camera taking off her gown, upon walking into the room the pt had ripped out her IV as well as ripped off the cardiac monitor leads and all her clothes. Pt was soaked in urine and tossing and turning in the bed. Pt got complete bed change, placed in new gown and brief, and got a new IV placed. Pt given medications to help ease her anxiety. See SAUMYA Keith RN  03/17/25 3413

## 2025-03-18 NOTE — HOSPITAL COURSE
"Erica Machado is a 59 y.o. female past medical history of bipolar disorder, schizoaffective disorder, asthma, COPD, GERD, MDD, hyperlipidemia, seizure disorder, migraines, suicide attempt with acetaminophen, barbituate and alcohol abuse, and hypothyroidism presented to ED after decrease in consciousness. Patient states that she presented due to seizures which have been gradually worsening over the last several days. ED notes state that the patient presented for decreased alertness after ingestion of unknown prescribed drugs in a suicide attempt. Patient does not recall what medications those were or when she took them.      In the ED, she told providers that she had no intent to harm herself and was taking medications to help her sleep because \"her mind will not turn off.\" There, she was oriented to person and place, and initially agitated, pulling IV lines and cardiac monitors. She was soaked in urine and tossing in bed. She was changed lethargic, rousable to noxious stimuli, and did not appear internally stimulated.      Previously in 12/2024, she was admitted to [facility] for 5 days due to headaches and transient encephalopathy. She was initially treated for meningitis, but ID consulted and did not believe the presentation was consistent with meningitis. She was discharged home with a diagnosis of migraine headache. In 6/15/2024 admitted to [place] for n/v and seizures, found to have sodium of 117 and was treated for medication induced SIADH with fluid restriction. Held vilazidone per psych recs. Gave hypertonic saline on 3/19. Na improved to 129. She remained stable and was transferred to floors on 3/21/25.    On the floors, she remained hemodynamically stable. Na 129. She remained asymptomatic. She was continued on free water restriction 1200 ml and continued on lacosamide 50 mg BID, lamotrigine, and duloxetine. We discussed changes in medications, including tapering off oxcarbazepine and discontinuing " vilazodone. She will be discharged with follow up CBC and CMP on 3/25/25 as well as follow up with PCP and neurology. Referrals were made for nephrology for further evaluation of hyponatremia and endocrinology for follow up hypothyroidism.

## 2025-03-18 NOTE — PROGRESS NOTES
03/18/25 1520   Discharge Planning   Living Arrangements Family members  (sister and nephew)   Support Systems Family members   Assistance Needed Patient is A&O X3, on room air at baseline, does not uses a CPAP/BIPAP at home, is not currently active with any community/home care agencies, is independent with ADLs, does not drive and does not require use of assistive devices for ambulation.   Type of Residence Private residence   Number of Stairs to Enter Residence 4   Number of Stairs Within Residence 12  (to second floor but patient stays on first floor only)   Do you have animals or pets at home? Yes   Type of Animals or Pets dog   Who is requesting discharge planning? Provider   Home or Post Acute Services None   Expected Discharge Disposition Home   Does the patient need discharge transport arranged? No   Financial Resource Strain   How hard is it for you to pay for the very basics like food, housing, medical care, and heating? Not very   Housing Stability   In the last 12 months, was there a time when you were not able to pay the mortgage or rent on time? N   At any time in the past 12 months, were you homeless or living in a shelter (including now)? N   Transportation Needs   In the past 12 months, has lack of transportation kept you from medical appointments or from getting medications? no   In the past 12 months, has lack of transportation kept you from meetings, work, or from getting things needed for daily living? No   Stroke Family Assessment   Stroke Family Assessment Needed No   Intensity of Service   Intensity of Service 0-30 min

## 2025-03-18 NOTE — H&P
"Subjective   HPI  Erica Machado is a 59 y.o. female past medical history of bipolar disorder, schizoaffective disorder, asthma, COPD, GERD, MDD, hyperlipidemia, seizure disorder, migraines, suicide attempt with acetaminophen, barbituate and alcohol abuse, and hypothyroidism presented to ED after decrease in consciousness. Patient states that she presented due to seizures which have been gradually worsening over the last several days. ED notes state that the patient presented for decreased alertness after ingestion of unknown prescribed drugs in a suicide attempt. Patient does not recall what medications those were or when she took them.     In the ED, she told providers that she had no intent to harm herself and was taking medications to help her sleep because \"her mind will not turn off.\" There, she was oriented to person and place, and initially agitated, pulling IV lines and cardiac monitors. She was soaked in urine and tossing in bed. She was changed lethargic, rousable to noxious stimuli, and did not appear internally stimulated.     Previously in 12/2024, she was admitted to [facility] for 5 days due to headaches and transient encephalopathy. She was initially treated for meningitis, but ID consulted and did not believe the presentation was consistent with meningitis. She was discharged home with a diagnosis of migraine headache. In 6/15/2024 admitted to [facility] for n/v and seizures, found to have sodium of 117 and was treated for medication induced SIADH with fluid restriction.    Review of Systems   Constitutional:  Positive for fatigue. Negative for activity change, appetite change, chills and diaphoresis.   HENT: Negative.     Respiratory: Negative.     Cardiovascular: Negative.    Gastrointestinal: Negative.    Genitourinary:  Positive for dysuria. Negative for flank pain and urgency.   Musculoskeletal:  Positive for arthralgias and back pain.   Skin: Negative.    Neurological:  Positive for seizures and " headaches. Negative for tremors and syncope.   Psychiatric/Behavioral:  Positive for agitation, behavioral problems, confusion and sleep disturbance. Negative for hallucinations, self-injury and suicidal ideas.      ED Course  Vitals - T35.6, HR76, RR16, /96, 97% RA    Labs -   Lab Results   Component Value Date    WBC 12.8 (H) 03/17/2025    HGB 14.2 03/17/2025    HCT 38.8 03/17/2025    MCV 85 03/17/2025     03/17/2025     Lab Results   Component Value Date    GLUCOSE 94 03/18/2025    CALCIUM 8.2 (L) 03/18/2025     (LL) 03/18/2025    K 3.5 03/18/2025    CO2 24 03/18/2025    CL 83 (L) 03/18/2025    BUN 9 03/18/2025    CREATININE 0.74 03/18/2025     Interventions -   150cc hypertonic saline  20mEq potassium IV  1g Ceftriaxone  5mg haloperidol  NS infusion 100cc/hr for 8 hours    Objective   Physical Exam  Constitutional:       General: She is not in acute distress.     Appearance: She is obese. She is not ill-appearing, toxic-appearing or diaphoretic.   HENT:      Head: Normocephalic and atraumatic.      Mouth/Throat:      Mouth: Mucous membranes are moist.      Pharynx: Oropharynx is clear.   Cardiovascular:      Rate and Rhythm: Normal rate and regular rhythm.      Pulses: Normal pulses.      Heart sounds: Normal heart sounds.   Pulmonary:      Effort: Pulmonary effort is normal.      Breath sounds: Normal breath sounds.   Abdominal:      General: Abdomen is flat. Bowel sounds are normal. There is no distension.      Palpations: Abdomen is soft.      Tenderness: There is no abdominal tenderness. There is no guarding.      Hernia: No hernia is present.   Musculoskeletal:      Right lower leg: No edema.      Left lower leg: No edema.   Skin:     General: Skin is warm and dry.      Capillary Refill: Capillary refill takes less than 2 seconds.   Neurological:      General: No focal deficit present.      Mental Status: She is easily aroused. She is lethargic and disoriented.      Cranial Nerves: No  cranial nerve deficit.      Sensory: No sensory deficit.      Motor: No weakness.   Psychiatric:         Attention and Perception: She is inattentive. She perceives auditory hallucinations. She does not perceive visual hallucinations.         Mood and Affect: Mood normal. Affect is blunt and flat. Affect is not angry or inappropriate.         Speech: Speech is delayed. Speech is not slurred or tangential.         Behavior: Behavior is slowed and withdrawn. Behavior is cooperative.         Thought Content: Thought content normal.   Temp:  [35.6 °C (96.1 °F)-36.2 °C (97.1 °F)] 36.1 °C (97 °F)  Heart Rate:  [60-76] 64  Resp:  [11-18] 13  BP: (116-189)/() 118/78    Labs:   CBC:   Recent Labs     03/17/25  1634 09/27/24 2013 09/26/24  0437   WBC 12.8* 12.1* 6.5   HGB 14.2 13.0 12.3   HCT 38.8 38.8 37.7    355 262   MCV 85 96 99     CMP:   Recent Labs     03/18/25  0352 03/17/25  2050 03/17/25  1726 03/17/25  1634   * 113* 108* 110*   K 3.5 3.0*  --  3.3*   CL 83* 82*  --  78*   CO2 24 21  --  20*   ANIONGAP 11 13  --  15   BUN 9 8  --  9   CREATININE 0.74 0.53  --  0.62   EGFR >90 >90  --  >90   GLUCOSE 94 91  --  100*     Recent Labs     03/17/25  1634 09/27/24 2013 09/21/24  0433 06/16/24  0516 06/15/24  1138 09/16/23  1210   ALBUMIN 4.9 5.1* 3.9 4.6 4.7 5.5*   ALKPHOS 104 71 53 45 55 72   ALT 15 18 14 21 22 43   AST 16 18 10 31 33 22   BILITOT 0.5 0.4 0.3 0.6 0.8 0.4   LIPASE  --   --   --  18 7* 9    < > = values in this interval not displayed.     Calcium/Phos:   Lab Results   Component Value Date    CALCIUM 8.2 (L) 03/18/2025    PHOS 4.4 09/23/2024      COAG:   Recent Labs     03/17/25  1634 09/19/24  1606 10/26/18  1503   INR 1.0 1.0 1.0     ENDO:   Recent Labs     03/17/25  2050 04/17/24  1117 04/11/24  0524 02/07/24  1117 12/14/23  1435 09/18/23  0513 12/12/22  0708 11/05/22  0607   TSH 25.97* 54.60* 117.00*  --   --  43.42*  --   --    HGBA1C  --   --   --  5.5 5.3  --  5.8 6.1*       Urine:  Recent Labs     03/17/25  1726 03/17/25  1649 09/22/24  1056 09/22/24  0521   OSMOLALITY 232*  --   --  265*   OSMURSPOT  --  390 502  --    SODIUMURR  --  57 128  --    NACREATUR  --  98 273  --       Images:  XR chest 1 view  Result Date: 3/17/2025  No acute cardiopulmonary process.     Meds:  Scheduled medications  amLODIPine, 10 mg, oral, Daily  busPIRone, 15 mg, oral, BID  desvenlafaxine, 100 mg, oral, Daily  DULoxetine, 30 mg, oral, Daily  enoxaparin, 40 mg, subcutaneous, q24h  fenofibrate, 160 mg, oral, Daily  fluticasone furoate-vilanteroL, 1 puff, inhalation, Daily  fluticasone furoate-vilanteroL, 1 puff, inhalation, Daily  gabapentin, 600 mg, oral, TID  lamoTRIgine, 75 mg, oral, Daily  levothyroxine, 150 mcg, oral, Daily  lisinopril, 40 mg, oral, Daily  mirtazapine, 15 mg, oral, Nightly  [Held by provider] Non-Formulary Medication, 1 each, oral, Daily  OXcarbazepine, 300 mg, oral, BID  pantoprazole, 40 mg, oral, Daily before breakfast  prazosin, 1 mg, oral, Nightly  thiamine, 100 mg, oral, Daily  traZODone, 100 mg, oral, Nightly  vilazodone, 40 mg, oral, Nightly    PRN medications  PRN medications: albuterol, meclizine     Assessment   Erica Machado is a 59 y.o. female presenting for encephalopathy and seizures and admitted to the ICU for hyponatremia. 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 Transferred from [place] for ICU management of sodium of 108.    Neuro  # Encephalopathy  # Witnessed seizures at home  :: Suspect secondary to hyponatremia  :: S/p 5mg haloperidol in ED  - See  for management of hyponatremia  - Lactate normal    # Migraines  :: On abortive treatment with rimegepant  - Use  migraine algorithm if acute episode inpatient    Pulm  # Asthma  - Continue home fomoterol 2 times daily  - Continue home budesonide 2 times daily  - Continue PRN albuterol    Cardiovascular  # HLD  - Continue home fenfofibrate    # HTN  - Continue home amlodipine 10 and lisinopril 40    GI  # GERD  - Continue home pantoprazole 40    Renal/  # Hyponatremia  :: Unclear etiology, workup pending, SIADH vs hypothyroidism vs glucocorticoid deficiency  - Fluid restrict 1200cc daily  - Urine osm, urine electrolytes, serum osm  - AM cortisol  - ACTH stim test  - Q4 RFP  - Goal sodium 116 by 2100 on 3/18  - If sodium elevated, start D5W 50-100cc/hr until the next sodium check    # UTI  - Nitrofurantoin 100mg    Endo  # Hypothyroidism  - Continue home levothyroxine    ID  No acute issues    Heme  No acute issues    MSK/Rheum  No acute issues    Derm  No acute issues    Psych  # Schizoaffective disorder  # Bipolar disorder  - Hold home aripiprazole 400mg 28 days  - Continue Brexpiprazole 5HT1A and D2 receptors and antagonism of 5HT2A (causes headache, fatigue, restlessness)  - Continue home lamotrigine    # MDD with previous suicide attempt  # SANDEEP  - Continue buspar 30mg twice daily  - Vilazodone SSRI (can cause SIADH, worsen shyam/hypomania, and seizures) associated with discontinuation syndrome  - Mirtazapine tetracyclic antidepressant - can worsen seizures  - Stop zolpidem    # Medication overdose with unknown intent  - Suicide precautions  - Sitter bedside  - Psychiatry consult    F PO  E Replete PRN, goal Na 114-116 by 2100 on 3/18  N Regular carb controlled fluid restrict  G None  DVT: Enoxaparin  Abx: None  Lines: PIV 20g antecubital   Drips: None  Pressors: None  Code status: Full Code  NoK:  Primary Emergency Contact: Morgan Pratt, Home Phone: [phone]    Dispo: Transfer to ICU from [place] for hyponatremia. Lives at home with sister.    Beto Prince MD MS  PGY2 Internal Medicine

## 2025-03-19 ENCOUNTER — APPOINTMENT (OUTPATIENT)
Dept: CARDIOLOGY | Facility: HOSPITAL | Age: 60
End: 2025-03-19
Payer: MEDICAID

## 2025-03-19 PROBLEM — R93.5 ABNORMAL CT OF THE ABDOMEN: Status: RESOLVED | Noted: 2025-03-19 | Resolved: 2025-03-19

## 2025-03-19 PROBLEM — F40.00 AGORAPHOBIA, UNSPECIFIED: Status: RESOLVED | Noted: 2021-07-19 | Resolved: 2025-03-19

## 2025-03-19 PROBLEM — F39 MOOD DISORDER (CMS-HCC): Status: ACTIVE | Noted: 2025-03-19

## 2025-03-19 PROBLEM — H65.93 UNSPECIFIED NONSUPPURATIVE OTITIS MEDIA, BILATERAL: Status: RESOLVED | Noted: 2023-10-10 | Resolved: 2025-03-19

## 2025-03-19 PROBLEM — L03.119 CELLULITIS AND ABSCESS OF FOOT, EXCEPT TOES: Status: RESOLVED | Noted: 2025-03-19 | Resolved: 2025-03-19

## 2025-03-19 PROBLEM — E03.9 ACQUIRED HYPOTHYROIDISM: Status: ACTIVE | Noted: 2025-03-19

## 2025-03-19 PROBLEM — N30.00 ACUTE CYSTITIS WITHOUT HEMATURIA: Status: ACTIVE | Noted: 2025-03-19

## 2025-03-19 PROBLEM — G89.4 CHRONIC PAIN SYNDROME: Status: ACTIVE | Noted: 2025-03-19

## 2025-03-19 PROBLEM — J20.9 ACUTE BRONCHITIS: Status: RESOLVED | Noted: 2025-03-19 | Resolved: 2025-03-19

## 2025-03-19 PROBLEM — G43.A1: Status: ACTIVE | Noted: 2025-03-19

## 2025-03-19 PROBLEM — F29 PSYCHOSIS (MULTI): Status: ACTIVE | Noted: 2025-03-19

## 2025-03-19 PROBLEM — M62.81 MUSCLE WEAKNESS (GENERALIZED): Status: ACTIVE | Noted: 2025-03-19

## 2025-03-19 PROBLEM — L02.619 CELLULITIS AND ABSCESS OF FOOT, EXCEPT TOES: Status: RESOLVED | Noted: 2025-03-19 | Resolved: 2025-03-19

## 2025-03-19 PROBLEM — R93.5 ABNORMAL COMPUTERIZED AXIAL TOMOGRAPHY OF ABDOMEN: Status: RESOLVED | Noted: 2025-03-19 | Resolved: 2025-03-19

## 2025-03-19 PROBLEM — R41.82 AMS (ALTERED MENTAL STATUS): Status: ACTIVE | Noted: 2025-03-19

## 2025-03-19 PROBLEM — F10.11 ALCOHOL USE DISORDER, MILD, IN EARLY REMISSION: Status: RESOLVED | Noted: 2021-05-26 | Resolved: 2025-03-19

## 2025-03-19 PROBLEM — M99.08 SOMATIC DYSFUNCTION OF RIB: Status: RESOLVED | Noted: 2025-03-19 | Resolved: 2025-03-19

## 2025-03-19 PROBLEM — Z79.899 POLYPHARMACY: Status: ACTIVE | Noted: 2025-03-19

## 2025-03-19 PROBLEM — T50.902A INTENTIONAL OVERDOSE (MULTI): Status: RESOLVED | Noted: 2025-03-19 | Resolved: 2025-03-19

## 2025-03-19 PROBLEM — Z00.8 ENCOUNTER FOR PSYCHOLOGICAL EVALUATION: Status: RESOLVED | Noted: 2025-03-19 | Resolved: 2025-03-19

## 2025-03-19 PROBLEM — M25.562 PAIN IN LEFT KNEE: Status: ACTIVE | Noted: 2025-03-19

## 2025-03-19 PROBLEM — M25.562 CHRONIC PAIN OF LEFT KNEE: Status: ACTIVE | Noted: 2025-03-19

## 2025-03-19 PROBLEM — S81.012S: Status: ACTIVE | Noted: 2025-03-19

## 2025-03-19 PROBLEM — G89.29 CHRONIC PAIN OF LEFT KNEE: Status: ACTIVE | Noted: 2025-03-19

## 2025-03-19 PROBLEM — Z81.8: Status: ACTIVE | Noted: 2018-08-08

## 2025-03-19 PROBLEM — G43.809 CERVICOGENIC MIGRAINE: Status: ACTIVE | Noted: 2025-03-19

## 2025-03-19 PROBLEM — T50.902A SUICIDE ATTEMPT BY DRUG INGESTION (MULTI): Status: RESOLVED | Noted: 2025-03-19 | Resolved: 2025-03-19

## 2025-03-19 PROBLEM — R68.89 FLU-LIKE SYMPTOMS: Status: RESOLVED | Noted: 2025-03-19 | Resolved: 2025-03-19

## 2025-03-19 LAB
ALBUMIN SERPL BCP-MCNC: 3.8 G/DL (ref 3.4–5)
ALBUMIN SERPL BCP-MCNC: 3.9 G/DL (ref 3.4–5)
ALBUMIN SERPL BCP-MCNC: 3.9 G/DL (ref 3.4–5)
ALBUMIN SERPL BCP-MCNC: 4.1 G/DL (ref 3.4–5)
ANION GAP SERPL CALC-SCNC: 10 MMOL/L (ref 10–20)
ANION GAP SERPL CALC-SCNC: 11 MMOL/L (ref 10–20)
ANION GAP SERPL CALC-SCNC: 11 MMOL/L (ref 10–20)
ANION GAP SERPL CALC-SCNC: 12 MMOL/L (ref 10–20)
APPEARANCE UR: CLEAR
ATRIAL RATE: 78 BPM
BILIRUB UR STRIP.AUTO-MCNC: NEGATIVE MG/DL
BUN SERPL-MCNC: 10 MG/DL (ref 6–23)
BUN SERPL-MCNC: 10 MG/DL (ref 6–23)
BUN SERPL-MCNC: 8 MG/DL (ref 6–23)
BUN SERPL-MCNC: 9 MG/DL (ref 6–23)
CALCIUM SERPL-MCNC: 8 MG/DL (ref 8.6–10.3)
CALCIUM SERPL-MCNC: 8.2 MG/DL (ref 8.6–10.3)
CALCIUM SERPL-MCNC: 8.2 MG/DL (ref 8.6–10.3)
CALCIUM SERPL-MCNC: 8.5 MG/DL (ref 8.6–10.3)
CHLORIDE SERPL-SCNC: 86 MMOL/L (ref 98–107)
CHLORIDE SERPL-SCNC: 88 MMOL/L (ref 98–107)
CHLORIDE SERPL-SCNC: 88 MMOL/L (ref 98–107)
CHLORIDE SERPL-SCNC: 89 MMOL/L (ref 98–107)
CO2 SERPL-SCNC: 19 MMOL/L (ref 21–32)
CO2 SERPL-SCNC: 20 MMOL/L (ref 21–32)
CO2 SERPL-SCNC: 21 MMOL/L (ref 21–32)
CO2 SERPL-SCNC: 21 MMOL/L (ref 21–32)
COLOR UR: YELLOW
CORTIS AM PEAK SERPL-MSCNC: 28.4 UG/DL (ref 5–20)
CREAT SERPL-MCNC: 0.6 MG/DL (ref 0.5–1.05)
CREAT SERPL-MCNC: 0.61 MG/DL (ref 0.5–1.05)
CREAT SERPL-MCNC: 0.64 MG/DL (ref 0.5–1.05)
CREAT SERPL-MCNC: 0.69 MG/DL (ref 0.5–1.05)
EGFRCR SERPLBLD CKD-EPI 2021: >90 ML/MIN/1.73M*2
ERYTHROCYTE [DISTWIDTH] IN BLOOD BY AUTOMATED COUNT: 13.2 % (ref 11.5–14.5)
GLUCOSE SERPL-MCNC: 101 MG/DL (ref 74–99)
GLUCOSE SERPL-MCNC: 104 MG/DL (ref 74–99)
GLUCOSE SERPL-MCNC: 107 MG/DL (ref 74–99)
GLUCOSE SERPL-MCNC: 159 MG/DL (ref 74–99)
GLUCOSE UR STRIP.AUTO-MCNC: NORMAL MG/DL
HCT VFR BLD AUTO: 33.5 % (ref 36–46)
HGB BLD-MCNC: 12 G/DL (ref 12–16)
HOLD SPECIMEN: NORMAL
KETONES UR STRIP.AUTO-MCNC: ABNORMAL MG/DL
LEUKOCYTE ESTERASE UR QL STRIP.AUTO: ABNORMAL
MAGNESIUM SERPL-MCNC: 1.76 MG/DL (ref 1.6–2.4)
MCH RBC QN AUTO: 31.7 PG (ref 26–34)
MCHC RBC AUTO-ENTMCNC: 35.8 G/DL (ref 32–36)
MCV RBC AUTO: 88 FL (ref 80–100)
NITRITE UR QL STRIP.AUTO: NEGATIVE
NRBC BLD-RTO: 0 /100 WBCS (ref 0–0)
P AXIS: 59 DEGREES
P OFFSET: 184 MS
P ONSET: 131 MS
PH UR STRIP.AUTO: 6.5 [PH]
PHOSPHATE SERPL-MCNC: 2.3 MG/DL (ref 2.5–4.9)
PHOSPHATE SERPL-MCNC: 2.4 MG/DL (ref 2.5–4.9)
PHOSPHATE SERPL-MCNC: 2.5 MG/DL (ref 2.5–4.9)
PHOSPHATE SERPL-MCNC: 2.6 MG/DL (ref 2.5–4.9)
PLATELET # BLD AUTO: 250 X10*3/UL (ref 150–450)
POTASSIUM SERPL-SCNC: 3.6 MMOL/L (ref 3.5–5.3)
POTASSIUM SERPL-SCNC: 3.7 MMOL/L (ref 3.5–5.3)
POTASSIUM SERPL-SCNC: 3.7 MMOL/L (ref 3.5–5.3)
POTASSIUM SERPL-SCNC: 3.8 MMOL/L (ref 3.5–5.3)
PR INTERVAL: 188 MS
PROT UR STRIP.AUTO-MCNC: ABNORMAL MG/DL
Q ONSET: 225 MS
QRS COUNT: 13 BEATS
QRS DURATION: 102 MS
QT INTERVAL: 434 MS
QTC CALCULATION(BAZETT): 494 MS
QTC FREDERICIA: 473 MS
R AXIS: 13 DEGREES
RBC # BLD AUTO: 3.79 X10*6/UL (ref 4–5.2)
RBC # UR STRIP.AUTO: NEGATIVE MG/DL
RBC #/AREA URNS AUTO: ABNORMAL /HPF
SODIUM SERPL-SCNC: 114 MMOL/L (ref 136–145)
SODIUM SERPL-SCNC: 115 MMOL/L (ref 136–145)
SODIUM SERPL-SCNC: 115 MMOL/L (ref 136–145)
SODIUM SERPL-SCNC: 116 MMOL/L (ref 136–145)
SP GR UR STRIP.AUTO: 1.02
T AXIS: -8 DEGREES
T OFFSET: 442 MS
UROBILINOGEN UR STRIP.AUTO-MCNC: NORMAL MG/DL
VENTRICULAR RATE: 78 BPM
WBC # BLD AUTO: 5.7 X10*3/UL (ref 4.4–11.3)
WBC #/AREA URNS AUTO: >50 /HPF

## 2025-03-19 PROCEDURE — 97165 OT EVAL LOW COMPLEX 30 MIN: CPT | Mod: GO

## 2025-03-19 PROCEDURE — 2500000002 HC RX 250 W HCPCS SELF ADMINISTERED DRUGS (ALT 637 FOR MEDICARE OP, ALT 636 FOR OP/ED)

## 2025-03-19 PROCEDURE — 94640 AIRWAY INHALATION TREATMENT: CPT

## 2025-03-19 PROCEDURE — 87086 URINE CULTURE/COLONY COUNT: CPT | Mod: GEALAB

## 2025-03-19 PROCEDURE — 36415 COLL VENOUS BLD VENIPUNCTURE: CPT

## 2025-03-19 PROCEDURE — 51702 INSERT TEMP BLADDER CATH: CPT

## 2025-03-19 PROCEDURE — 99291 CRITICAL CARE FIRST HOUR: CPT | Performed by: INTERNAL MEDICINE

## 2025-03-19 PROCEDURE — 2580000001 HC RX 258 IV SOLUTIONS

## 2025-03-19 PROCEDURE — 82533 TOTAL CORTISOL: CPT | Mod: GEALAB

## 2025-03-19 PROCEDURE — 2020000001 HC ICU ROOM DAILY

## 2025-03-19 PROCEDURE — 99254 IP/OBS CNSLTJ NEW/EST MOD 60: CPT | Performed by: PSYCHIATRY & NEUROLOGY

## 2025-03-19 PROCEDURE — 83735 ASSAY OF MAGNESIUM: CPT

## 2025-03-19 PROCEDURE — 94760 N-INVAS EAR/PLS OXIMETRY 1: CPT

## 2025-03-19 PROCEDURE — 85027 COMPLETE CBC AUTOMATED: CPT

## 2025-03-19 PROCEDURE — 80069 RENAL FUNCTION PANEL: CPT

## 2025-03-19 PROCEDURE — 51701 INSERT BLADDER CATHETER: CPT

## 2025-03-19 PROCEDURE — 2500000001 HC RX 250 WO HCPCS SELF ADMINISTERED DRUGS (ALT 637 FOR MEDICARE OP)

## 2025-03-19 PROCEDURE — 2500000004 HC RX 250 GENERAL PHARMACY W/ HCPCS (ALT 636 FOR OP/ED)

## 2025-03-19 PROCEDURE — 81001 URINALYSIS AUTO W/SCOPE: CPT

## 2025-03-19 PROCEDURE — 84100 ASSAY OF PHOSPHORUS: CPT

## 2025-03-19 RX ORDER — TRAZODONE HYDROCHLORIDE 50 MG/1
100 TABLET ORAL NIGHTLY PRN
Status: DISCONTINUED | OUTPATIENT
Start: 2025-03-19 | End: 2025-03-22 | Stop reason: HOSPADM

## 2025-03-19 RX ORDER — ONDANSETRON HYDROCHLORIDE 2 MG/ML
4 INJECTION, SOLUTION INTRAVENOUS EVERY 4 HOURS PRN
Status: DISCONTINUED | OUTPATIENT
Start: 2025-03-19 | End: 2025-03-22 | Stop reason: HOSPADM

## 2025-03-19 RX ORDER — OXYCODONE AND ACETAMINOPHEN 5; 325 MG/1; MG/1
1 TABLET ORAL EVERY 6 HOURS PRN
Status: DISCONTINUED | OUTPATIENT
Start: 2025-03-19 | End: 2025-03-20

## 2025-03-19 RX ORDER — ALBUTEROL SULFATE 0.83 MG/ML
SOLUTION RESPIRATORY (INHALATION)
Status: COMPLETED
Start: 2025-03-19 | End: 2025-03-19

## 2025-03-19 RX ORDER — KETOROLAC TROMETHAMINE 30 MG/ML
15 INJECTION, SOLUTION INTRAMUSCULAR; INTRAVENOUS ONCE
Status: COMPLETED | OUTPATIENT
Start: 2025-03-19 | End: 2025-03-19

## 2025-03-19 RX ORDER — MAGNESIUM SULFATE HEPTAHYDRATE 40 MG/ML
2 INJECTION, SOLUTION INTRAVENOUS ONCE
Status: COMPLETED | OUTPATIENT
Start: 2025-03-19 | End: 2025-03-19

## 2025-03-19 RX ADMIN — DULOXETINE HYDROCHLORIDE 30 MG: 30 CAPSULE, DELAYED RELEASE ORAL at 08:37

## 2025-03-19 RX ADMIN — MAGNESIUM SULFATE HEPTAHYDRATE 2 G: 40 INJECTION, SOLUTION INTRAVENOUS at 08:08

## 2025-03-19 RX ADMIN — OXCARBAZEPINE 300 MG: 150 TABLET, FILM COATED ORAL at 20:50

## 2025-03-19 RX ADMIN — OXYCODONE HYDROCHLORIDE AND ACETAMINOPHEN 1 TABLET: 5; 325 TABLET ORAL at 20:55

## 2025-03-19 RX ADMIN — OXYCODONE HYDROCHLORIDE AND ACETAMINOPHEN 1 TABLET: 5; 325 TABLET ORAL at 15:46

## 2025-03-19 RX ADMIN — AMLODIPINE BESYLATE 10 MG: 10 TABLET ORAL at 08:37

## 2025-03-19 RX ADMIN — FENOFIBRATE 160 MG: 160 TABLET ORAL at 08:37

## 2025-03-19 RX ADMIN — ALBUTEROL SULFATE 2.5 MG: 2.5 SOLUTION RESPIRATORY (INHALATION) at 16:42

## 2025-03-19 RX ADMIN — DESVENLAFAXINE SUCCINATE 100 MG: 100 TABLET, FILM COATED, EXTENDED RELEASE ORAL at 08:38

## 2025-03-19 RX ADMIN — BUSPIRONE HYDROCHLORIDE 15 MG: 15 TABLET ORAL at 08:36

## 2025-03-19 RX ADMIN — SODIUM CHLORIDE 150 ML: 3 INJECTION, SOLUTION INTRAVENOUS at 20:15

## 2025-03-19 RX ADMIN — LEVOTHYROXINE SODIUM 150 MCG: 0.07 TABLET ORAL at 05:30

## 2025-03-19 RX ADMIN — SODIUM CHLORIDE 150 ML: 3 INJECTION, SOLUTION INTRAVENOUS at 10:49

## 2025-03-19 RX ADMIN — GABAPENTIN 600 MG: 300 CAPSULE ORAL at 15:46

## 2025-03-19 RX ADMIN — THIAMINE HCL TAB 100 MG 100 MG: 100 TAB at 08:37

## 2025-03-19 RX ADMIN — ONDANSETRON 4 MG: 2 INJECTION, SOLUTION INTRAMUSCULAR; INTRAVENOUS at 08:10

## 2025-03-19 RX ADMIN — NITROFURANTOIN MONOHYDRATE/MACROCRYSTALS 100 MG: 25; 75 CAPSULE ORAL at 20:50

## 2025-03-19 RX ADMIN — ACETAMINOPHEN 975 MG: 325 TABLET ORAL at 05:30

## 2025-03-19 RX ADMIN — PRAZOSIN HYDROCHLORIDE 1 MG: 1 CAPSULE ORAL at 20:50

## 2025-03-19 RX ADMIN — LISINOPRIL 40 MG: 20 TABLET ORAL at 08:37

## 2025-03-19 RX ADMIN — LAMOTRIGINE 75 MG: 25 TABLET ORAL at 08:37

## 2025-03-19 RX ADMIN — OXYCODONE HYDROCHLORIDE AND ACETAMINOPHEN 1 TABLET: 5; 325 TABLET ORAL at 10:08

## 2025-03-19 RX ADMIN — OXCARBAZEPINE 300 MG: 150 TABLET, FILM COATED ORAL at 08:37

## 2025-03-19 RX ADMIN — KETOROLAC TROMETHAMINE 15 MG: 30 INJECTION, SOLUTION INTRAMUSCULAR at 08:11

## 2025-03-19 RX ADMIN — NITROFURANTOIN MONOHYDRATE/MACROCRYSTALS 100 MG: 25; 75 CAPSULE ORAL at 08:38

## 2025-03-19 RX ADMIN — ENOXAPARIN SODIUM 40 MG: 40 INJECTION SUBCUTANEOUS at 08:08

## 2025-03-19 RX ADMIN — GABAPENTIN 600 MG: 300 CAPSULE ORAL at 20:50

## 2025-03-19 RX ADMIN — PANTOPRAZOLE SODIUM 40 MG: 40 TABLET, DELAYED RELEASE ORAL at 05:30

## 2025-03-19 RX ADMIN — BUSPIRONE HYDROCHLORIDE 15 MG: 15 TABLET ORAL at 20:50

## 2025-03-19 RX ADMIN — MIRTAZAPINE 15 MG: 15 TABLET, FILM COATED ORAL at 20:50

## 2025-03-19 RX ADMIN — GABAPENTIN 600 MG: 300 CAPSULE ORAL at 08:36

## 2025-03-19 ASSESSMENT — PAIN SCALES - GENERAL
PAINLEVEL_OUTOF10: 10 - WORST POSSIBLE PAIN
PAINLEVEL_OUTOF10: 7
PAINLEVEL_OUTOF10: 8
PAINLEVEL_OUTOF10: 5 - MODERATE PAIN
PAINLEVEL_OUTOF10: 7
PAINLEVEL_OUTOF10: 8
PAINLEVEL_OUTOF10: 0 - NO PAIN
PAINLEVEL_OUTOF10: 5 - MODERATE PAIN
PAINLEVEL_OUTOF10: 10 - WORST POSSIBLE PAIN
PAINLEVEL_OUTOF10: 4
PAINLEVEL_OUTOF10: 5 - MODERATE PAIN
PAINLEVEL_OUTOF10: 0 - NO PAIN

## 2025-03-19 ASSESSMENT — ENCOUNTER SYMPTOMS
CARDIOVASCULAR NEGATIVE: 1
EYES NEGATIVE: 1
BACK PAIN: 1
DYSURIA: 1
CONFUSION: 1
GASTROINTESTINAL NEGATIVE: 1
RESPIRATORY NEGATIVE: 1
CONSTITUTIONAL NEGATIVE: 1
HEMATOLOGIC/LYMPHATIC NEGATIVE: 1
MYALGIAS: 1
ENDOCRINE NEGATIVE: 1
ALLERGIC/IMMUNOLOGIC NEGATIVE: 1

## 2025-03-19 ASSESSMENT — PAIN - FUNCTIONAL ASSESSMENT
PAIN_FUNCTIONAL_ASSESSMENT: 0-10

## 2025-03-19 ASSESSMENT — COGNITIVE AND FUNCTIONAL STATUS - GENERAL
DAILY ACTIVITIY SCORE: 20
HELP NEEDED FOR BATHING: A LITTLE
TOILETING: A LITTLE
DRESSING REGULAR LOWER BODY CLOTHING: A LITTLE
PERSONAL GROOMING: A LITTLE

## 2025-03-19 ASSESSMENT — COLUMBIA-SUICIDE SEVERITY RATING SCALE - C-SSRS
1. SINCE LAST CONTACT, HAVE YOU WISHED YOU WERE DEAD OR WISHED YOU COULD GO TO SLEEP AND NOT WAKE UP?: NO
1. SINCE LAST CONTACT, HAVE YOU WISHED YOU WERE DEAD OR WISHED YOU COULD GO TO SLEEP AND NOT WAKE UP?: NO
2. HAVE YOU ACTUALLY HAD ANY THOUGHTS OF KILLING YOURSELF?: NO
6. HAVE YOU EVER DONE ANYTHING, STARTED TO DO ANYTHING, OR PREPARED TO DO ANYTHING TO END YOUR LIFE?: NO
6. HAVE YOU EVER DONE ANYTHING, STARTED TO DO ANYTHING, OR PREPARED TO DO ANYTHING TO END YOUR LIFE?: NO
2. HAVE YOU ACTUALLY HAD ANY THOUGHTS OF KILLING YOURSELF?: NO

## 2025-03-19 ASSESSMENT — PAIN DESCRIPTION - LOCATION: LOCATION: HEAD

## 2025-03-19 ASSESSMENT — PAIN DESCRIPTION - DESCRIPTORS
DESCRIPTORS: ACHING
DESCRIPTORS: ACHING
DESCRIPTORS: HEADACHE
DESCRIPTORS: HEADACHE

## 2025-03-19 ASSESSMENT — ACTIVITIES OF DAILY LIVING (ADL)
BATHING_ASSISTANCE: STAND BY
ADL_ASSISTANCE: INDEPENDENT

## 2025-03-19 NOTE — CARE PLAN
The patient's goals for the shift include   Drowsy, did not voice any goals .    The clinical goals for the shift include Will have improved sodium and potassium levels by am

## 2025-03-19 NOTE — CARE PLAN
"The patient's goals for the shift include  \"to feel better\"    The clinical goals for the shift include patient will have increased sodium by 2 points or maintain current sodium level by end of shift.   "

## 2025-03-19 NOTE — CONSULTS
"Inpatient consult to Psychiatry  Consult performed by: Benja Amaya MD  Consult ordered by: Unruly Fuentes MD  Reason for consult: Hyponatremia, intentional overdose of unknown intent, complex psychiatric medication management          Date: 3-     Reason For Consult: \"Hyponatremia, intentional overdose of unknown intent, complex psychiatric medication management\"      Chief Complaint: \"I was having seizures that day\"    History Of Present Illness  Erica Machado is a 59 y.o. year old female patient who initially presented to the Emergency Department (ED) with encephalopathy and seizures, and was subsequently hospitalized in the ICU at [facility] with hyponatremia (see H&P below). The patient reports that she took her seizure pills \"too soon\" just prior to presenting to the ED. She denies having any recent suicidal ideation or suicide intent, and that this was not an overdose attempt.       Erica denies experiencing any recent or current symptoms of shyam (elevated/irritable mood, decreased need for sleep, pressured speech, racing thoughts, increased distractibility, increased psychomotor agitation, increased self-esteem). She also denied experiencing any recent or current worsening symptoms of Major Depression or Generalized Anxiety Disorder.        Erica does report feeling that she has been under a lot of stress from the number of recent deaths in her family, along with a sister having brain cancer.         Per Hospitalist's H&P of 3-:  Erica Machado is a 59 y.o. female presenting for encephalopathy and seizures and admitted to the ICU for hyponatremia. 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 Transferred from [place] for ICU management of sodium of 108.            PSYCHIATRIC REVIEW OF SYMPTOMS  Depressive Symptoms: negative  Manic Symptoms: negative  Anxiety Symptoms: negative  Psychotic Symptoms:  None  Delirium/Altered Mental Status Symptoms:  None  Other Symptoms/Concerns:  None          Past Medical History  Past Medical History:   Diagnosis Date    Anxiety     COPD (chronic obstructive pulmonary disease) (Multi)     Diverticulosis     GERD (gastroesophageal reflux disease)     HLD (hyperlipidemia)     Hypertension     Hypothyroidism     Insomnia     Migraine     Osteoarthritis     Other specified anxiety disorders 01/03/2022    Depression with anxiety    Schizoaffective disorder (Multi)     Seizure (Multi)         Past Psychiatric History: 1) Past Dx: Bipolar Disorder, MDD, SANDEEP, PNES, and SAD unspecified type - (Dr Carcamo note of 2/2025)                                            2) Multiple prior psychiatric hospitalizations                                            3) One prior suicide attempt about 20 years ago by \"taking THC\"                                            4) No prior SIB                                            5) No gun ownership and no guns in the home                                            6) No reported prior rehab treatment programs                                            7) Mental Health Provider(s): [facility] - Dr JOSE Carcamo.                                            8) Current psych meds: 1) see below.      Past Psychiatric Meds: 1) Prozac                                         2) Zoloft                                                 Family History: 1) 3 siblings with SAD, bipolar type                             2) Female cousin committed suicide by Tylenol overdose      Social History  Social History     Socioeconomic History    Marital status:      Spouse name: Not on file    Number of children: Not on file    Years of education: Not on file    " Highest education level: Not on file   Occupational History    Not on file   Tobacco Use    Smoking status: Former     Current packs/day: 0.50     Types: Cigarettes     Passive exposure: Never    Smokeless tobacco: Never   Vaping Use    Vaping status: Never Used   Substance and Sexual Activity    Alcohol use: Not Currently     Comment: quit drinking 2 years ago    Drug use: Never    Sexual activity: Not Currently   Other Topics Concern    Not on file   Social History Narrative    Not on file     Social Drivers of Health     Financial Resource Strain: Low Risk  (3/18/2025)    Overall Financial Resource Strain (CARDIA)     Difficulty of Paying Living Expenses: Not very hard   Food Insecurity: Patient Declined (3/18/2025)    Hunger Vital Sign     Worried About Running Out of Food in the Last Year: Patient declined     Ran Out of Food in the Last Year: Patient declined   Transportation Needs: No Transportation Needs (3/18/2025)    PRAPARE - Transportation     Lack of Transportation (Medical): No     Lack of Transportation (Non-Medical): No   Physical Activity: Patient Declined (3/18/2025)    Exercise Vital Sign     Days of Exercise per Week: Patient declined     Minutes of Exercise per Session: Patient declined   Stress: Not on File (4/14/2022)    Received from [facility]    Stress     Stress: 0   Recent Concern: Stress - At Risk (4/14/2022)    Received from [facility]    Stress     Stress: 2   Social Connections: Not on File (4/14/2022)    Received from [facility]    Social Connections     Connectedness: 0   Intimate Partner Violence: Not At Risk (3/18/2025)    Humiliation, Afraid, Rape, and Kick questionnaire     Fear of Current or Ex-Partner: No     Emotionally Abused: No     Physically Abused: No     Sexually Abused: No   Housing Stability: Low Risk  (3/18/2025)    Housing Stability Vital Sign     Unable to Pay for Housing in the Last Year: No     Number of Times Moved in the Last Year: 0     Homeless in the Last Year: No         Substance Abuse History:  1) Tobacco - Former (quit 3 years ago)  2) ETOH - Sober x 5 years.  3) Cannabis - Denies  4) Denies any illicit drug use.      The patient dropped out in the 11th grade but got her GED. 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 She is on disability.  and  once. She has 4 adult sons. No significant legal history. The patient lives with her sister and 50 year-old nephew in her sister's house.      Allergies  Allergies   Allergen Reactions    Codeine Itching, Shortness of breath and Unknown     gi upset     gi upset    Dulaglutide GI Upset     dizziness    Bee Pollen Unknown    Ferrous Sulfate Hives and Unknown    Oxycodone-Acetaminophen Hives and Unknown    Penicillins     Sulfa (Sulfonamide Antibiotics)         Scheduled medications  amLODIPine, 10 mg, oral, Daily  budesonide, 0.5 mg, nebulization, BID  busPIRone, 15 mg, oral, BID  desvenlafaxine, 100 mg, oral, Daily  DULoxetine, 30 mg, oral, Daily  enoxaparin, 40 mg, subcutaneous, q24h  fenofibrate, 160 mg, oral, Daily  influenza, 0.5 mL, intramuscular, During hospitalization  formoterol, 20 mcg, nebulization, BID  gabapentin, 600 mg, oral, TID  ketorolac, 15 mg, intravenous, Once  lamoTRIgine, 75 mg, oral, Daily  levothyroxine, 150 mcg, oral, Daily  lisinopril, 40 mg, oral, Daily  mirtazapine, 15 mg, oral, Nightly  nitrofurantoin (macrocrystal-monohydrate), 100 mg, oral, q12h DWAIN  [Held by provider] Non-Formulary Medication, 1 each, oral, Daily  OXcarbazepine, 300 mg, oral, BID  pantoprazole, 40 mg, oral, Daily before breakfast  prazosin, 1 mg, oral, Nightly  thiamine, 100 mg, oral, Daily  traZODone, 100 mg, oral, Nightly  vilazodone, 40 mg, oral, Nightly      Continuous medications     PRN medications  PRN medications: acetaminophen, albuterol, meclizine, trimethobenzamide         Review of Systems   Review of Systems   Constitutional: Negative.    HENT: Negative.     Eyes: Negative.   "  Respiratory: Negative.     Cardiovascular: Negative.    Gastrointestinal: Negative.    Endocrine: Negative.    Genitourinary:  Positive for dysuria (see initial medical HPI.).   Musculoskeletal:  Positive for back pain and myalgias.   Skin: Negative.    Allergic/Immunologic: Negative.    Neurological:         1) Psychogenic non-epileptic seizures   Hematological: Negative.    Psychiatric/Behavioral:  Positive for confusion (see HPI).         Physical Exam  Mental Status Exam:   General: Appropriately groomed and dressed in hospital attire, laying in bed, awake and alert.   Appearance: Appears stated age.   Attitude: Calm, cooperative.   Behavior: Appropriate eye contact.   Motor Activity: No agitation or retardation. No EPS/TD. Unable to evaluate gait and station. Normal muscle tone and bulk.   Speech: Regular rate, rhythm, volume and tone, spontaneous,  fluent. Non-pressured.   Mood: \"Good\"   Affect: Neutral.   Thought Process: Organized, and goal directed.   Thought Content: Does not endorse suicidal ideation or any suicide plans.   Does not endorse homicidal ideation.  No overt delusions or paranoia elicited.    Thought Perception: No auditory hallucinations, visual hallucinations, tactile hallucinations, olfactory hallucinations, or gustatory hallucinations were elicited or noted. She does not appear to be responding to hallucinatory stimuli.   Cognition: Alert, oriented x 3. No deficits noted.  Adequate fund of knowledge. No deficit in recent and remote memory. No deficits in attention, concentration or language.   Insight: Fair-to-good, as patient recognizes symptoms of illness and need for recommended treatments.    Judgment: Intact, as patient can make reasonable decisions about ordinary activities of daily living and necessary medical care recommendations.       Last Recorded Vitals  Visit Vitals  /73   Pulse 62   Temp 35.3 °C (95.5 °F) (Temporal)   Resp 16        Relevant Results  Results for " orders placed or performed during the hospital encounter of 03/18/25 (from the past 24 hours)   Renal Function Panel   Result Value Ref Range    Glucose 98 74 - 99 mg/dL    Sodium 116 (LL) 136 - 145 mmol/L    Potassium 3.1 (L) 3.5 - 5.3 mmol/L    Chloride 84 (L) 98 - 107 mmol/L    Bicarbonate 22 21 - 32 mmol/L    Anion Gap 13 10 - 20 mmol/L    Urea Nitrogen 9 6 - 23 mg/dL    Creatinine 0.63 0.50 - 1.05 mg/dL    eGFR >90 >60 mL/min/1.73m*2    Calcium 8.3 (L) 8.6 - 10.3 mg/dL    Phosphorus 3.3 2.5 - 4.9 mg/dL    Albumin 4.1 3.4 - 5.0 g/dL   Osmolality   Result Value Ref Range    Osmolality, Serum 245 (L) 280 - 300 mOsm/kg   Lactate   Result Value Ref Range    Lactate 0.8 0.4 - 2.0 mmol/L   ECG 12 lead   Result Value Ref Range    Ventricular Rate 68 BPM    Atrial Rate 68 BPM    MS Interval 218 ms    QRS Duration 96 ms    QT Interval 474 ms    QTC Calculation(Bazett) 504 ms    P Axis 68 degrees    R Axis 28 degrees    T Axis 66 degrees    QRS Count 11 beats    Q Onset 224 ms    P Onset 115 ms    P Offset 182 ms    T Offset 461 ms    QTC Fredericia 494 ms   Osmolality, urine   Result Value Ref Range    Osmolality, Urine Random 422 200 - 1,200 mOsm/kg   Urine electrolytes   Result Value Ref Range    Sodium, Urine Random 39 mmol/L    Sodium/Creatinine Ratio 50 Not established. mmol/g Creat    Potassium, Urine Random 36 mmol/L    Potassium/Creatinine Ratio 46 Not established mmol/g Creat    Chloride, Urine Random 46 mmol/L    Chloride/Creatinine Ratio 58 38 - 318 mmol/g creat    Creatinine, Urine Random 78.7 20.0 - 320.0 mg/dL   Albumin-Creatinine Ratio, Urine Random   Result Value Ref Range    Albumin, Urine Random 101.1 Not established mg/L    Creatinine, Urine Random 78.7 20.0 - 320.0 mg/dL    Albumin/Creatinine Ratio 128.5 (H) <30.0 ug/mg Creat   Renal function panel   Result Value Ref Range    Glucose 97 74 - 99 mg/dL    Sodium 114 (LL) 136 - 145 mmol/L    Potassium 3.2 (L) 3.5 - 5.3 mmol/L    Chloride 84 (L) 98 - 107  mmol/L    Bicarbonate 21 21 - 32 mmol/L    Anion Gap 12 10 - 20 mmol/L    Urea Nitrogen 8 6 - 23 mg/dL    Creatinine 0.49 (L) 0.50 - 1.05 mg/dL    eGFR >90 >60 mL/min/1.73m*2    Calcium 8.1 (L) 8.6 - 10.3 mg/dL    Phosphorus 2.9 2.5 - 4.9 mg/dL    Albumin 4.0 3.4 - 5.0 g/dL   Renal function panel   Result Value Ref Range    Glucose 123 (H) 74 - 99 mg/dL    Sodium 112 (LL) 136 - 145 mmol/L    Potassium 2.8 (LL) 3.5 - 5.3 mmol/L    Chloride 84 (L) 98 - 107 mmol/L    Bicarbonate 20 (L) 21 - 32 mmol/L    Anion Gap 11 10 - 20 mmol/L    Urea Nitrogen 9 6 - 23 mg/dL    Creatinine 0.55 0.50 - 1.05 mg/dL    eGFR >90 >60 mL/min/1.73m*2    Calcium 8.0 (L) 8.6 - 10.3 mg/dL    Phosphorus 2.4 (L) 2.5 - 4.9 mg/dL    Albumin 4.1 3.4 - 5.0 g/dL   Renal function panel   Result Value Ref Range    Glucose 91 74 - 99 mg/dL    Sodium 113 (LL) 136 - 145 mmol/L    Potassium 3.9 3.5 - 5.3 mmol/L    Chloride 85 (L) 98 - 107 mmol/L    Bicarbonate 21 21 - 32 mmol/L    Anion Gap 11 10 - 20 mmol/L    Urea Nitrogen 9 6 - 23 mg/dL    Creatinine 0.62 0.50 - 1.05 mg/dL    eGFR >90 >60 mL/min/1.73m*2    Calcium 8.0 (L) 8.6 - 10.3 mg/dL    Phosphorus 2.5 2.5 - 4.9 mg/dL    Albumin 3.9 3.4 - 5.0 g/dL   Renal Function Panel   Result Value Ref Range    Glucose 101 (H) 74 - 99 mg/dL    Sodium 114 (LL) 136 - 145 mmol/L    Potassium 3.7 3.5 - 5.3 mmol/L    Chloride 86 (L) 98 - 107 mmol/L    Bicarbonate 21 21 - 32 mmol/L    Anion Gap 11 10 - 20 mmol/L    Urea Nitrogen 10 6 - 23 mg/dL    Creatinine 0.64 0.50 - 1.05 mg/dL    eGFR >90 >60 mL/min/1.73m*2    Calcium 8.2 (L) 8.6 - 10.3 mg/dL    Phosphorus 2.5 2.5 - 4.9 mg/dL    Albumin 3.9 3.4 - 5.0 g/dL   Magnesium   Result Value Ref Range    Magnesium 1.76 1.60 - 2.40 mg/dL   CBC   Result Value Ref Range    WBC 5.7 4.4 - 11.3 x10*3/uL    nRBC 0.0 0.0 - 0.0 /100 WBCs    RBC 3.79 (L) 4.00 - 5.20 x10*6/uL    Hemoglobin 12.0 12.0 - 16.0 g/dL    Hematocrit 33.5 (L) 36.0 - 46.0 %    MCV 88 80 - 100 fL    MCH 31.7 26.0  - 34.0 pg    MCHC 35.8 32.0 - 36.0 g/dL    RDW 13.2 11.5 - 14.5 %    Platelets 250 150 - 450 x10*3/uL   Renal Function Panel   Result Value Ref Range    Glucose 107 (H) 74 - 99 mg/dL    Sodium 116 (LL) 136 - 145 mmol/L    Potassium 3.7 3.5 - 5.3 mmol/L    Chloride 89 (L) 98 - 107 mmol/L    Bicarbonate 20 (L) 21 - 32 mmol/L    Anion Gap 11 10 - 20 mmol/L    Urea Nitrogen 10 6 - 23 mg/dL    Creatinine 0.60 0.50 - 1.05 mg/dL    eGFR >90 >60 mL/min/1.73m*2    Calcium 8.2 (L) 8.6 - 10.3 mg/dL    Phosphorus 2.4 (L) 2.5 - 4.9 mg/dL    Albumin 3.8 3.4 - 5.0 g/dL     *Note: Due to a large number of results and/or encounters for the requested time period, some results have not been displayed. A complete set of results can be found in Results Review.         Diagnostic Impression:  1) Schizoaffective Disorder, unspecified type  2) MDD, recurrent, currently in full remission  3) SANDEEP  4) PNES  5) Alcohol Use Disorder, severe, in sustained remission  6) Hyponatremia          Recommendations:  1) Stop Vilazodone 40 mg Qdaily (due to current hyponatremia  2) Change Trazodone 100 mg at bedtime to PRN  3) May discontinue 1-to-1 sitter  4) Continue other psychiatric medications as prescribed outpatient.  5) Will follow.        I spent 60 minutes in the professional and overall care of this patient.        Benja Amaya MD

## 2025-03-19 NOTE — PROGRESS NOTES
03/19/25 0922   Discharge Planning   Living Arrangements Family members  (sister and nephew)   Support Systems Family members   Assistance Needed Patient is A&O X3, on room air at baseline, does not uses a CPAP/BIPAP at home, is not currently active with any community/home care agencies, is independent with ADLs, does not drive and does not require use of assistive devices for ambulation.   Type of Residence Private residence   Number of Stairs to Enter Residence 4   Number of Stairs Within Residence 12  (to second floor but patient stays on first floor only)   Do you have animals or pets at home? Yes   Type of Animals or Pets dog   Who is requesting discharge planning? Provider   Home or Post Acute Services None   Expected Discharge Disposition Home   Does the patient need discharge transport arranged? No

## 2025-03-19 NOTE — PROGRESS NOTES
Subjective   Erica Machado is a 59 y.o. female admitted for hyponatremia. She is feeling well today, states that she is improved compared to yesterday. Had a loose bowel movement overnight. States that she had a few days of 4-5 episodes of diarrhea daily prior to admission. Denies SI/HI. Slept well. Calm.    Objective   Physical Exam  Constitutional:       General: She is not in acute distress.     Appearance: Normal appearance. She is obese. She is not ill-appearing.   HENT:      Head: Normocephalic and atraumatic.      Nose: Nose normal.      Mouth/Throat:      Mouth: Mucous membranes are moist.      Pharynx: Oropharynx is clear.   Eyes:      Extraocular Movements: Extraocular movements intact.      Pupils: Pupils are equal, round, and reactive to light.   Cardiovascular:      Rate and Rhythm: Normal rate and regular rhythm.      Pulses: Normal pulses.      Heart sounds: Normal heart sounds.   Pulmonary:      Effort: Pulmonary effort is normal. No respiratory distress.      Breath sounds: Normal breath sounds. No wheezing, rhonchi or rales.   Abdominal:      General: Abdomen is flat. Bowel sounds are normal.      Palpations: Abdomen is soft.   Musculoskeletal:         General: Normal range of motion.      Right lower leg: No edema.      Left lower leg: No edema.   Skin:     General: Skin is warm and dry.      Capillary Refill: Capillary refill takes less than 2 seconds.   Neurological:      General: No focal deficit present.      Mental Status: She is alert and oriented to person, place, and time. Mental status is at baseline.      Cranial Nerves: No cranial nerve deficit.      Motor: No weakness.      Coordination: Coordination normal.   Psychiatric:         Attention and Perception: Attention normal.         Mood and Affect: Mood and affect normal.         Speech: Speech normal.         Behavior: Behavior normal. Behavior is cooperative.         Thought Content: Thought content normal.         Cognition  and Memory: Cognition and memory normal.     Temp:  [35.3 °C (95.5 °F)-36.2 °C (97.2 °F)] 35.3 °C (95.5 °F)  Heart Rate:  [61-90] 75  Resp:  [12-21] 16  BP: ()/() 158/90  Vitals:    03/19/25 0700   Weight: 91.3 kg (201 lb 4.5 oz)     I/Os    Intake/Output Summary (Last 24 hours) at 3/19/2025 1100  Last data filed at 3/19/2025 0630  Gross per 24 hour   Intake 1220 ml   Output 1000 ml   Net 220 ml     Labs:   CBC:   Recent Labs     03/19/25 0419 03/17/25 1634 09/27/24 2013   WBC 5.7 12.8* 12.1*   HGB 12.0 14.2 13.0   HCT 33.5* 38.8 38.8    378 355   MCV 88 85 96     CMP:   Recent Labs     03/19/25 0827 03/19/25 0419 03/19/25  0018   * 116* 114*   K 3.8 3.7 3.7   CL 88* 89* 86*   CO2 21 20* 21   ANIONGAP 10 11 11   BUN 9 10 10   CREATININE 0.61 0.60 0.64   EGFR >90 >90 >90   GLUCOSE 104* 107* 101*     Recent Labs     03/19/25 0827 03/19/25 0419 03/19/25 0018 03/17/25 1634 09/27/24 2013   ALBUMIN 4.1 3.8 3.9 4.9 5.1*   ALKPHOS  --   --   --  104 71   ALT  --   --   --  15 18   AST  --   --   --  16 18   BILITOT  --   --   --  0.5 0.4   LIPASE  --   --   --   --   --     < > = values in this interval not displayed.     Calcium/Phos:   Lab Results   Component Value Date    CALCIUM 8.5 (L) 03/19/2025    PHOS 2.6 03/19/2025      COAG:   Recent Labs     03/17/25  1634 09/19/24  1606 10/26/18  1503   INR 1.0 1.0 1.0     ENDO:   Recent Labs     03/17/25 2050 04/17/24  1117 04/11/24  0524 02/07/24  1117   TSH 25.97* 54.60* 117.00*  --    HGBA1C  --   --   --  5.5      URINE:   Recent Labs     03/18/25  1051 03/18/25  0830 03/17/25  1726 03/17/25  1649 09/22/24  1056 09/22/24  0521   OSMOLALITY  --  245* 232*  --   --  265*   OSMURSPOT 422  --   --  390 502  --    SODIUMURR 39  --   --  57 128  --    NACREATUR 50  --   --  98 273  --       Micro/ID:   Urine culture pending    Imaging:  XR chest 1 view  Result Date: 3/17/2025  No acute cardiopulmonary process.     Meds:  Scheduled  medications  amLODIPine, 10 mg, oral, Daily  budesonide, 0.5 mg, nebulization, BID  busPIRone, 15 mg, oral, BID  desvenlafaxine, 100 mg, oral, Daily  DULoxetine, 30 mg, oral, Daily  enoxaparin, 40 mg, subcutaneous, q24h  fenofibrate, 160 mg, oral, Daily  influenza, 0.5 mL, intramuscular, During hospitalization  formoterol, 20 mcg, nebulization, BID  gabapentin, 600 mg, oral, TID  lamoTRIgine, 75 mg, oral, Daily  levothyroxine, 150 mcg, oral, Daily  lisinopril, 40 mg, oral, Daily  mirtazapine, 15 mg, oral, Nightly  nitrofurantoin (macrocrystal-monohydrate), 100 mg, oral, q12h DWAIN  [Held by provider] Non-Formulary Medication, 1 each, oral, Daily  OXcarbazepine, 300 mg, oral, BID  pantoprazole, 40 mg, oral, Daily before breakfast  prazosin, 1 mg, oral, Nightly  sodium chloride, 150 mL, intravenous, Once  thiamine, 100 mg, oral, Daily    PRN medications  PRN medications: albuterol, meclizine, ondansetron, oxyCODONE-acetaminophen, traZODone, trimethobenzamide     Assessment   Erica Machado is a 59 y.o. female presenting for encephalopathy and seizures and admitted to the ICU for hyponatremia. 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 Transferred from [place] for ICU management of sodium of 108.      Neuro  # Encephalopathy - resolved  # Witnessed seizures at home  :: Suspect secondary to hyponatremia  :: S/p 5mg haloperidol in ED  - See  for management of hyponatremia  - Lactate normal    # Migraines  :: On abortive treatment with rimegepant  - Current headache, trial percocet once     Pulm  # Asthma  - Continue home fomoterol 2 times daily  - Continue home budesonide 2 times daily  - Continue PRN albuterol      Cardiovascular  # HLD  - Continue home fenfofibrate    # HTN  - Continue home amlodipine 10 and lisinopril 40     GI  # GERD  - Continue home pantoprazole 40     Renal/  # Hyponatremia  :: Most consistent with SIADH  - Free water restrict 1200cc daily, encourage electrolyte drinks  - 3% saline 150cc over 5 hours  - RFP 1 hour after 3% saline finishes then q4hr  - Goal sodium 120 by 2100 on 3/19  - If sodium elevated, start D5W 50-100cc/hr until the next sodium check     # UTI  - UA with reflex culture  - Culture not ordered from ED  - Nitrofurantoin 100mg twice daily for 5 days total     Endo  # Hypothyroidism  - Continue home levothyroxine     ID  No acute issues     Heme  No acute issues     MSK/Rheum  No acute issues     Derm  No acute issues     Psych  # Schizoaffective disorder  # Bipolar disorder  - Hold home aripiprazole 400mg 28 days  - Continue Brexpiprazole  - Continue home lamotrigine     # MDD with previous suicide attempt  # SANDEEP  - Continue buspar 30mg twice daily  - Hold vilazodone per psychiatry  - Continue home mirtazapine  - Stop zolpidem     # Medication overdose with unknown intent  :: Low risk of suicide attempt  - Psychiatry consult appreciate recs  - Discontinue sitter and suicide precautions    # Insomnia  - Change trazodone from scheduled to PRN    F PO  E Replete PRN, goal Na 114-116 by 2100 on 3/18  N Regular carb controlled fluid restrict  G None  DVT: Enoxaparin  Abx: Nitrofurantoin 100mg twice daily for 5 days (3/18-3/22)  Lines: PIV 20g antecubital   Drips: None  Pressors: None  Code status: Full Code  NoK: Morgan Pratt, Home Phone: [phone]     Dispo: Transfer to ICU from [place] for hyponatremia. Lives at home with sister.    Beto Prince MD MS  PGY2 Internal Medicine

## 2025-03-19 NOTE — PROGRESS NOTES
Occupational Therapy    Evaluation    Patient Name: Erica Machado  MRN: [id number]  Department: [facility] ICU  Room: 07/07-A  Today's Date: 3/19/2025  Time Calculation  Start Time: 1406  Stop Time: 1423  Time Calculation (min): 17 min    Assessment  IP OT Assessment  OT Assessment: Pt presents with decreased balance, decreased endurance, decreased safety awareness. Recommend low intensity OT to maximize pt safety and independence with ADLs/mobility with home-going.  Prognosis: Good  Barriers to Discharge Home: No anticipated barriers  Evaluation/Treatment Tolerance: Patient tolerated treatment well  Medical Staff Made Aware: Yes  End of Session Communication: Bedside nurse  End of Session Patient Position: Up in chair, Alarm on  Plan:  Treatment Interventions: ADL retraining, Functional transfer training, UE strengthening/ROM, Endurance training  OT Frequency: 2 times per week  OT Discharge Recommendations: Low intensity level of continued care  Equipment Recommended upon Discharge: Wheeled walker  OT Recommended Transfer Status: Stand by assist  OT - OK to Discharge: Yes (per OT POC)    Subjective   Current Problem:  1. Hyponatremia  CANCELED: Transthoracic Echo (TTE) Limited    CANCELED: Transthoracic Echo (TTE) Limited        General:  General  Reason for Referral: 58 yo female referred to OT for hyponatremia, impaired ADLs/mobility  Referred By: Beto Prince MD  Past Medical History Relevant to Rehab: bipolar disorder, schizoaffective disorder, asthma, COPD, GERD, MDD, hyperlipidemia, seizure disorder, migraines, suicide attempt with acetaminophen, barbituate and alcohol abuse, and hypothyroidism  Prior to Session Communication: Bedside nurse  Patient Position Received: Bed, 3 rail up, Alarm on  General Comment: Pt pleasant, cooperative with OT evaluation  Precautions:  Medical Precautions: Fall precautions (IV, tele)     Date/Time Vitals Session Patient Position Pulse Resp SpO2 BP MAP (mmHg)    03/19/25 1400  --  --  79  19  96 %  116/66  78     03/19/25 1500 --  --  76  19  98 %  --  --                Pain:  Pain Assessment  Pain Assessment: 0-10  0-10 (Numeric) Pain Score: 7  Pain Type: Acute pain  Pain Location: Head  Pain Descriptors: Headache  Pain Interventions: Repositioned, Ambulation/increased activity  Response to Interventions: No change in pain (RN notified)    Objective   Cognition:  Overall Cognitive Status: Impaired  Arousal/Alertness: Appropriate responses to stimuli  Orientation Level: Oriented X4  Safety/Judgement: Exceptions to WFL  Insight: Moderate  Impulsive: Moderately           Home Living:  Type of Home: House  Lives With: Siblings (sister & nephew)  Home Adaptive Equipment: None  Home Layout: Two level, Able to live on main level with bedroom/bathroom  Home Access: Stairs to enter with rails  Entrance Stairs-Number of Steps: 4  Bathroom Shower/Tub: Tub/shower unit  Bathroom Equipment: Grab bars in shower, Shower chair with back   Prior Function:  Level of [place]: Independent with ADLs and functional transfers, Independent with homemaking with ambulation  Receives Help From: Family  ADL Assistance: Independent  Homemaking Assistance: Independent (family able to assist as needed)  Ambulatory Assistance: Independent (no devices)  IADL History:  Mode of Transportation: Family  ADL:  Eating Assistance: Independent  Grooming Assistance: Stand by  Bathing Assistance: Stand by  UE Dressing Assistance: Independent  LE Dressing Assistance: Minimal  Toileting Assistance with Device: Stand by  Functional Assistance: Minimal  ADL Comments: Able to don socks using figure 4 position. Other ADLs anticipated.  Activity Tolerance:  Endurance: Tolerates 10 - 20 min exercise with multiple rests  Bed Mobility/Transfers: Bed Mobility  Bed Mobility: Yes  Bed Mobility 1  Bed Mobility 1: Supine to sitting  Level of Assistance 1: Modified independent  Bed Mobility Comments 1: HOB elevated, use of bed rails with  increased time    Transfers  Transfer: Yes  Transfer 1  Transfer From 1: Sit to  Transfer to 1: Stand  Technique 1: Sit to stand, Stand to sit  Transfer Level of Assistance 1: Contact guard  Trials/Comments 1: Completed 2 trials; pt impulsive with initial stand, pt became very lightheaded requiring seated rest.      Functional Mobility:  Functional Mobility  Functional Mobility Performed: Yes  Functional Mobility 1  Surface 1: Level tile  Device 1: No device  Assistance 1: Hand held assistance, Contact guard  Comments 1: Anmbulated around bed with HHA for general balance; pt demonstrating decreased safety/insight into balance deficit, refuses to use AD.  Sitting Balance:  Static Sitting Balance  Static Sitting-Balance Support: Feet supported  Static Sitting-Level of Assistance: Close supervision  Standing Balance:  Static Standing Balance  Static Standing-Balance Support: No upper extremity supported  Static Standing-Level of Assistance: Contact guard     IADL's:   Mode of Transportation: Family     Strength:  Strength Comments: BUE overall WFL     Coordination:  Movements are Fluid and Coordinated: Yes   Hand Function:  Hand Function  Gross Grasp: Functional  Coordination: Functional  Extremities: RUE   RUE : Within Functional Limits and LUE   LUE: Within Functional Limits    Outcome Measures: [facility] Daily Activity  Putting on and taking off regular lower body clothing: A little  Bathing (including washing, rinsing, drying): A little  Putting on and taking off regular upper body clothing: None  Toileting, which includes using toilet, bedpan or urinal: A little  Taking care of personal grooming such as brushing teeth: A little  Eating Meals: None  Daily Activity - Total Score: 20      Education Documentation  Body Mechanics, taught by Juanito Gomez OT at 3/19/2025  3:22 PM.  Learner: Patient  Readiness: Acceptance  Method: Explanation  Response: Verbalizes Understanding    Precautions, taught by Juanito  RADHA Gomez at 3/19/2025  3:22 PM.  Learner: Patient  Readiness: Acceptance  Method: Explanation  Response: Verbalizes Understanding    ADL Training, taught by Juanito Gomez OT at 3/19/2025  3:22 PM.  Learner: Patient  Readiness: Acceptance  Method: Explanation  Response: Verbalizes Understanding    Education Comments  No comments found.      Goals:   Encounter Problems       Encounter Problems (Active)       OT Goals       Pt will demo LE ADL completion with modified independence, using AE if needed.        Start:  03/19/25    Expected End:  04/02/25            Pt will increase static/dynamic standing balance to Good to increase safety and independence with functional task completion.         Start:  03/19/25    Expected End:  04/02/25            Pt will tolerate 10min stand during functional task completion with no more than 1 rest break in order to increase endurance for functional task completion.        Start:  03/19/25    Expected End:  04/02/25            Pt will complete [id number] transfers using LRD in preparation for ADLs with mod independence       Start:  03/19/25    Expected End:  04/02/25            Pt will demo and/or verbalize 2-3 energy conservation techniques to incorporate into functional mobility or ADL to improve performance and increase independence.        Start:  03/19/25    Expected End:  04/02/25

## 2025-03-20 PROBLEM — F33.42 MDD (MAJOR DEPRESSIVE DISORDER), RECURRENT, IN FULL REMISSION: Status: ACTIVE | Noted: 2025-03-20

## 2025-03-20 PROBLEM — F41.1 GAD (GENERALIZED ANXIETY DISORDER): Status: ACTIVE | Noted: 2025-03-20

## 2025-03-20 PROBLEM — F44.5 PSYCHOGENIC NONEPILEPTIC SEIZURE: Status: ACTIVE | Noted: 2025-03-20

## 2025-03-20 LAB
ALBUMIN SERPL BCP-MCNC: 3.7 G/DL (ref 3.4–5)
ALBUMIN SERPL BCP-MCNC: 3.8 G/DL (ref 3.4–5)
ALBUMIN SERPL BCP-MCNC: 3.9 G/DL (ref 3.4–5)
ALBUMIN SERPL BCP-MCNC: 4 G/DL (ref 3.4–5)
ALBUMIN SERPL BCP-MCNC: 4 G/DL (ref 3.4–5)
ANION GAP SERPL CALC-SCNC: 10 MMOL/L (ref 10–20)
ANION GAP SERPL CALC-SCNC: 11 MMOL/L (ref 10–20)
ANION GAP SERPL CALC-SCNC: 12 MMOL/L (ref 10–20)
ANION GAP SERPL CALC-SCNC: 12 MMOL/L (ref 10–20)
ANION GAP SERPL CALC-SCNC: 15 MMOL/L (ref 10–20)
BACTERIA UR CULT: NO GROWTH
BUN SERPL-MCNC: 4 MG/DL (ref 6–23)
BUN SERPL-MCNC: 5 MG/DL (ref 6–23)
BUN SERPL-MCNC: 5 MG/DL (ref 6–23)
BUN SERPL-MCNC: 6 MG/DL (ref 6–23)
BUN SERPL-MCNC: 7 MG/DL (ref 6–23)
CALCIUM SERPL-MCNC: 8.1 MG/DL (ref 8.6–10.3)
CALCIUM SERPL-MCNC: 8.1 MG/DL (ref 8.6–10.3)
CALCIUM SERPL-MCNC: 8.2 MG/DL (ref 8.6–10.3)
CALCIUM SERPL-MCNC: 8.5 MG/DL (ref 8.6–10.3)
CALCIUM SERPL-MCNC: 8.6 MG/DL (ref 8.6–10.3)
CHLORIDE SERPL-SCNC: 91 MMOL/L (ref 98–107)
CHLORIDE SERPL-SCNC: 92 MMOL/L (ref 98–107)
CHLORIDE SERPL-SCNC: 93 MMOL/L (ref 98–107)
CHLORIDE SERPL-SCNC: 96 MMOL/L (ref 98–107)
CHLORIDE SERPL-SCNC: 97 MMOL/L (ref 98–107)
CO2 SERPL-SCNC: 18 MMOL/L (ref 21–32)
CO2 SERPL-SCNC: 20 MMOL/L (ref 21–32)
CO2 SERPL-SCNC: 20 MMOL/L (ref 21–32)
CO2 SERPL-SCNC: 22 MMOL/L (ref 21–32)
CO2 SERPL-SCNC: 23 MMOL/L (ref 21–32)
CREAT SERPL-MCNC: 0.6 MG/DL (ref 0.5–1.05)
CREAT SERPL-MCNC: 0.61 MG/DL (ref 0.5–1.05)
CREAT SERPL-MCNC: 0.61 MG/DL (ref 0.5–1.05)
CREAT SERPL-MCNC: 0.64 MG/DL (ref 0.5–1.05)
CREAT SERPL-MCNC: 0.78 MG/DL (ref 0.5–1.05)
EGFRCR SERPLBLD CKD-EPI 2021: 89 ML/MIN/1.73M*2
EGFRCR SERPLBLD CKD-EPI 2021: >90 ML/MIN/1.73M*2
ERYTHROCYTE [DISTWIDTH] IN BLOOD BY AUTOMATED COUNT: 13.4 % (ref 11.5–14.5)
GLUCOSE BLD MANUAL STRIP-MCNC: 87 MG/DL (ref 74–99)
GLUCOSE SERPL-MCNC: 103 MG/DL (ref 74–99)
GLUCOSE SERPL-MCNC: 103 MG/DL (ref 74–99)
GLUCOSE SERPL-MCNC: 176 MG/DL (ref 74–99)
GLUCOSE SERPL-MCNC: 83 MG/DL (ref 74–99)
GLUCOSE SERPL-MCNC: 98 MG/DL (ref 74–99)
HCT VFR BLD AUTO: 34.1 % (ref 36–46)
HGB BLD-MCNC: 12 G/DL (ref 12–16)
MAGNESIUM SERPL-MCNC: 1.79 MG/DL (ref 1.6–2.4)
MCH RBC QN AUTO: 31.7 PG (ref 26–34)
MCHC RBC AUTO-ENTMCNC: 35.2 G/DL (ref 32–36)
MCV RBC AUTO: 90 FL (ref 80–100)
NRBC BLD-RTO: 0 /100 WBCS (ref 0–0)
PHOSPHATE SERPL-MCNC: 2 MG/DL (ref 2.5–4.9)
PHOSPHATE SERPL-MCNC: 2.2 MG/DL (ref 2.5–4.9)
PHOSPHATE SERPL-MCNC: 2.2 MG/DL (ref 2.5–4.9)
PHOSPHATE SERPL-MCNC: 2.5 MG/DL (ref 2.5–4.9)
PHOSPHATE SERPL-MCNC: 2.8 MG/DL (ref 2.5–4.9)
PLATELET # BLD AUTO: 259 X10*3/UL (ref 150–450)
POTASSIUM SERPL-SCNC: 3.6 MMOL/L (ref 3.5–5.3)
POTASSIUM SERPL-SCNC: 3.9 MMOL/L (ref 3.5–5.3)
POTASSIUM SERPL-SCNC: 3.9 MMOL/L (ref 3.5–5.3)
POTASSIUM SERPL-SCNC: 4.1 MMOL/L (ref 3.5–5.3)
POTASSIUM SERPL-SCNC: 4.2 MMOL/L (ref 3.5–5.3)
RBC # BLD AUTO: 3.78 X10*6/UL (ref 4–5.2)
SODIUM SERPL-SCNC: 119 MMOL/L (ref 136–145)
SODIUM SERPL-SCNC: 120 MMOL/L (ref 136–145)
SODIUM SERPL-SCNC: 121 MMOL/L (ref 136–145)
SODIUM SERPL-SCNC: 126 MMOL/L (ref 136–145)
SODIUM SERPL-SCNC: 126 MMOL/L (ref 136–145)
WBC # BLD AUTO: 5.8 X10*3/UL (ref 4.4–11.3)

## 2025-03-20 PROCEDURE — 83735 ASSAY OF MAGNESIUM: CPT

## 2025-03-20 PROCEDURE — 82947 ASSAY GLUCOSE BLOOD QUANT: CPT

## 2025-03-20 PROCEDURE — 80069 RENAL FUNCTION PANEL: CPT

## 2025-03-20 PROCEDURE — 2500000002 HC RX 250 W HCPCS SELF ADMINISTERED DRUGS (ALT 637 FOR MEDICARE OP, ALT 636 FOR OP/ED)

## 2025-03-20 PROCEDURE — 2500000004 HC RX 250 GENERAL PHARMACY W/ HCPCS (ALT 636 FOR OP/ED)

## 2025-03-20 PROCEDURE — 85027 COMPLETE CBC AUTOMATED: CPT

## 2025-03-20 PROCEDURE — 94640 AIRWAY INHALATION TREATMENT: CPT

## 2025-03-20 PROCEDURE — 94760 N-INVAS EAR/PLS OXIMETRY 1: CPT

## 2025-03-20 PROCEDURE — 99231 SBSQ HOSP IP/OBS SF/LOW 25: CPT | Performed by: PSYCHIATRY & NEUROLOGY

## 2025-03-20 PROCEDURE — 2500000001 HC RX 250 WO HCPCS SELF ADMINISTERED DRUGS (ALT 637 FOR MEDICARE OP)

## 2025-03-20 PROCEDURE — 2500000002 HC RX 250 W HCPCS SELF ADMINISTERED DRUGS (ALT 637 FOR MEDICARE OP, ALT 636 FOR OP/ED): Performed by: INTERNAL MEDICINE

## 2025-03-20 PROCEDURE — 99291 CRITICAL CARE FIRST HOUR: CPT | Performed by: INTERNAL MEDICINE

## 2025-03-20 PROCEDURE — 97161 PT EVAL LOW COMPLEX 20 MIN: CPT | Mod: GP

## 2025-03-20 PROCEDURE — 2500000005 HC RX 250 GENERAL PHARMACY W/O HCPCS

## 2025-03-20 PROCEDURE — 36415 COLL VENOUS BLD VENIPUNCTURE: CPT

## 2025-03-20 PROCEDURE — 84100 ASSAY OF PHOSPHORUS: CPT

## 2025-03-20 PROCEDURE — 99254 IP/OBS CNSLTJ NEW/EST MOD 60: CPT | Performed by: PSYCHIATRY & NEUROLOGY

## 2025-03-20 PROCEDURE — 9420000001 HC RT PATIENT EDUCATION 5 MIN

## 2025-03-20 PROCEDURE — 1100000001 HC PRIVATE ROOM DAILY

## 2025-03-20 RX ORDER — WATER
250 LIQUID (ML) MISCELLANEOUS 4 TIMES DAILY PRN
Status: DISCONTINUED | OUTPATIENT
Start: 2025-03-20 | End: 2025-03-22 | Stop reason: HOSPADM

## 2025-03-20 RX ORDER — LACOSAMIDE 100 MG/1
50 TABLET ORAL 2 TIMES DAILY
Status: DISCONTINUED | OUTPATIENT
Start: 2025-03-20 | End: 2025-03-22 | Stop reason: HOSPADM

## 2025-03-20 RX ORDER — SODIUM,POTASSIUM PHOSPHATES 280-250MG
1 POWDER IN PACKET (EA) ORAL 4 TIMES DAILY
Status: COMPLETED | OUTPATIENT
Start: 2025-03-20 | End: 2025-03-20

## 2025-03-20 RX ORDER — DULOXETIN HYDROCHLORIDE 60 MG/1
60 CAPSULE, DELAYED RELEASE ORAL DAILY
Status: DISCONTINUED | OUTPATIENT
Start: 2025-03-21 | End: 2025-03-22 | Stop reason: HOSPADM

## 2025-03-20 RX ORDER — LAMOTRIGINE 100 MG/1
100 TABLET ORAL DAILY
Status: DISCONTINUED | OUTPATIENT
Start: 2025-03-21 | End: 2025-03-22 | Stop reason: HOSPADM

## 2025-03-20 RX ORDER — BUTALBITAL, ACETAMINOPHEN AND CAFFEINE 50; 325; 40 MG/1; MG/1; MG/1
1 TABLET ORAL EVERY 8 HOURS PRN
Status: DISCONTINUED | OUTPATIENT
Start: 2025-03-20 | End: 2025-03-22 | Stop reason: HOSPADM

## 2025-03-20 RX ORDER — KETOROLAC TROMETHAMINE 30 MG/ML
15 INJECTION, SOLUTION INTRAMUSCULAR; INTRAVENOUS ONCE
Status: COMPLETED | OUTPATIENT
Start: 2025-03-20 | End: 2025-03-20

## 2025-03-20 RX ORDER — OXCARBAZEPINE 150 MG/1
150 TABLET, FILM COATED ORAL 2 TIMES DAILY
Status: DISCONTINUED | OUTPATIENT
Start: 2025-03-20 | End: 2025-03-22 | Stop reason: HOSPADM

## 2025-03-20 RX ORDER — DEXTROSE 50 % IN WATER (D50W) INTRAVENOUS SYRINGE
12.5
Status: DISCONTINUED | OUTPATIENT
Start: 2025-03-20 | End: 2025-03-22 | Stop reason: HOSPADM

## 2025-03-20 RX ORDER — DEXTROSE 50 % IN WATER (D50W) INTRAVENOUS SYRINGE
25
Status: DISCONTINUED | OUTPATIENT
Start: 2025-03-20 | End: 2025-03-22 | Stop reason: HOSPADM

## 2025-03-20 RX ADMIN — BUTALBITAL, ACETAMINOPHEN, AND CAFFEINE 1 TABLET: 325; 50; 40 TABLET ORAL at 08:43

## 2025-03-20 RX ADMIN — BUSPIRONE HYDROCHLORIDE 15 MG: 15 TABLET ORAL at 20:40

## 2025-03-20 RX ADMIN — ENOXAPARIN SODIUM 40 MG: 40 INJECTION SUBCUTANEOUS at 08:03

## 2025-03-20 RX ADMIN — NITROFURANTOIN MONOHYDRATE/MACROCRYSTALS 100 MG: 25; 75 CAPSULE ORAL at 20:41

## 2025-03-20 RX ADMIN — NITROFURANTOIN MONOHYDRATE/MACROCRYSTALS 100 MG: 25; 75 CAPSULE ORAL at 08:03

## 2025-03-20 RX ADMIN — FENOFIBRATE 160 MG: 160 TABLET ORAL at 08:02

## 2025-03-20 RX ADMIN — FORMOTEROL FUMARATE DIHYDRATE 20 MCG: 20 SOLUTION RESPIRATORY (INHALATION) at 19:20

## 2025-03-20 RX ADMIN — BUTALBITAL, ACETAMINOPHEN, AND CAFFEINE 1 TABLET: 325; 50; 40 TABLET ORAL at 22:38

## 2025-03-20 RX ADMIN — ONDANSETRON 4 MG: 2 INJECTION, SOLUTION INTRAMUSCULAR; INTRAVENOUS at 08:03

## 2025-03-20 RX ADMIN — DULOXETINE HYDROCHLORIDE 30 MG: 30 CAPSULE, DELAYED RELEASE ORAL at 08:03

## 2025-03-20 RX ADMIN — POTASSIUM & SODIUM PHOSPHATES POWDER PACK 280-160-250 MG 1 PACKET: 280-160-250 PACK at 13:19

## 2025-03-20 RX ADMIN — DESVENLAFAXINE SUCCINATE 100 MG: 100 TABLET, FILM COATED, EXTENDED RELEASE ORAL at 08:03

## 2025-03-20 RX ADMIN — OXYCODONE HYDROCHLORIDE AND ACETAMINOPHEN 1 TABLET: 5; 325 TABLET ORAL at 02:33

## 2025-03-20 RX ADMIN — OXCARBAZEPINE 300 MG: 150 TABLET, FILM COATED ORAL at 08:03

## 2025-03-20 RX ADMIN — AMLODIPINE BESYLATE 10 MG: 10 TABLET ORAL at 08:03

## 2025-03-20 RX ADMIN — PANTOPRAZOLE SODIUM 40 MG: 40 TABLET, DELAYED RELEASE ORAL at 07:04

## 2025-03-20 RX ADMIN — MIRTAZAPINE 15 MG: 15 TABLET, FILM COATED ORAL at 20:40

## 2025-03-20 RX ADMIN — KETOROLAC TROMETHAMINE 15 MG: 30 INJECTION, SOLUTION INTRAMUSCULAR at 05:26

## 2025-03-20 RX ADMIN — LACOSAMIDE 50 MG: 100 TABLET, FILM COATED ORAL at 13:20

## 2025-03-20 RX ADMIN — PRAZOSIN HYDROCHLORIDE 1 MG: 1 CAPSULE ORAL at 20:41

## 2025-03-20 RX ADMIN — LEVOTHYROXINE SODIUM 150 MCG: 0.07 TABLET ORAL at 05:27

## 2025-03-20 RX ADMIN — THIAMINE HCL TAB 100 MG 100 MG: 100 TAB at 08:03

## 2025-03-20 RX ADMIN — BUTALBITAL, ACETAMINOPHEN, AND CAFFEINE 1 TABLET: 325; 50; 40 TABLET ORAL at 16:51

## 2025-03-20 RX ADMIN — POTASSIUM PHOSPHATE, MONOBASIC AND POTASSIUM PHOSPHATE, DIBASIC 15 MMOL: 224; 236 INJECTION, SOLUTION, CONCENTRATE INTRAVENOUS at 22:51

## 2025-03-20 RX ADMIN — LACOSAMIDE 50 MG: 100 TABLET, FILM COATED ORAL at 20:40

## 2025-03-20 RX ADMIN — BUSPIRONE HYDROCHLORIDE 15 MG: 15 TABLET ORAL at 08:02

## 2025-03-20 RX ADMIN — LISINOPRIL 40 MG: 20 TABLET ORAL at 08:03

## 2025-03-20 RX ADMIN — GABAPENTIN 600 MG: 300 CAPSULE ORAL at 20:40

## 2025-03-20 RX ADMIN — GABAPENTIN 600 MG: 300 CAPSULE ORAL at 14:44

## 2025-03-20 RX ADMIN — POTASSIUM & SODIUM PHOSPHATES POWDER PACK 280-160-250 MG 1 PACKET: 280-160-250 PACK at 16:33

## 2025-03-20 RX ADMIN — GABAPENTIN 600 MG: 300 CAPSULE ORAL at 08:03

## 2025-03-20 RX ADMIN — LAMOTRIGINE 75 MG: 25 TABLET ORAL at 08:02

## 2025-03-20 RX ADMIN — BUDESONIDE 0.5 MG: 0.5 INHALANT RESPIRATORY (INHALATION) at 19:19

## 2025-03-20 ASSESSMENT — PAIN - FUNCTIONAL ASSESSMENT
PAIN_FUNCTIONAL_ASSESSMENT: 0-10

## 2025-03-20 ASSESSMENT — COGNITIVE AND FUNCTIONAL STATUS - GENERAL
WALKING IN HOSPITAL ROOM: A LITTLE
MOBILITY SCORE: 22
CLIMB 3 TO 5 STEPS WITH RAILING: A LITTLE

## 2025-03-20 ASSESSMENT — PAIN SCALES - GENERAL
PAINLEVEL_OUTOF10: 10 - WORST POSSIBLE PAIN
PAINLEVEL_OUTOF10: 0 - NO PAIN
PAINLEVEL_OUTOF10: 7
PAINLEVEL_OUTOF10: 7
PAINLEVEL_OUTOF10: 2
PAINLEVEL_OUTOF10: 2
PAINLEVEL_OUTOF10: 7
PAINLEVEL_OUTOF10: 8
PAINLEVEL_OUTOF10: 7

## 2025-03-20 ASSESSMENT — PAIN DESCRIPTION - DESCRIPTORS
DESCRIPTORS: ACHING
DESCRIPTORS: ACHING;HEADACHE

## 2025-03-20 ASSESSMENT — PAIN DESCRIPTION - LOCATION: LOCATION: HEAD

## 2025-03-20 ASSESSMENT — ACTIVITIES OF DAILY LIVING (ADL)
ADL_ASSISTANCE: INDEPENDENT
ADLS_ADDRESSED: YES

## 2025-03-20 NOTE — PROGRESS NOTES
Subjective   Erica Machado is a 59 y.o. female admitted for hyponatremia. Complained of headache on waking, states that fioricet is the only medication which helps. Toradol overnight and oxycodone/acetaminophen were attempted without success. Reports diarrhea, however per nursing these were more consistent with flatus than stool output.    Objective   Physical Exam  Constitutional:       General: She is not in acute distress.     Appearance: Normal appearance. She is obese. She is not ill-appearing.   HENT:      Head: Normocephalic and atraumatic.      Nose: Nose normal.      Mouth/Throat:      Mouth: Mucous membranes are moist.      Pharynx: Oropharynx is clear.   Eyes:      Extraocular Movements: Extraocular movements intact.      Pupils: Pupils are equal, round, and reactive to light.   Cardiovascular:      Rate and Rhythm: Normal rate and regular rhythm.      Pulses: Normal pulses.      Heart sounds: Normal heart sounds.   Pulmonary:      Effort: Pulmonary effort is normal. No respiratory distress.      Breath sounds: Normal breath sounds. No wheezing, rhonchi or rales.   Abdominal:      General: Abdomen is flat. Bowel sounds are normal.      Palpations: Abdomen is soft.   Musculoskeletal:         General: Normal range of motion.      Right lower leg: No edema.      Left lower leg: No edema.   Skin:     General: Skin is warm and dry.      Capillary Refill: Capillary refill takes less than 2 seconds.   Neurological:      General: No focal deficit present.      Mental Status: She is alert and oriented to person, place, and time. Mental status is at baseline.      Cranial Nerves: No cranial nerve deficit.      Motor: No weakness.      Coordination: Coordination normal.   Psychiatric:         Attention and Perception: Attention normal.         Mood and Affect: Mood and affect normal.         Speech: Speech normal.         Behavior: Behavior normal. Behavior is cooperative.         Thought Content: Thought  content normal.         Cognition and Memory: Cognition and memory normal.   Temp:  [35.9 °C (96.6 °F)-36.1 °C (97 °F)] 36.1 °C (97 °F)  Heart Rate:  [64-81] 67  Resp:  [12-26] 16  BP: (101-159)/(53-94) 140/94  Vitals:    03/20/25 0545   Weight: 94.8 kg (208 lb 15.9 oz)     I/Os    Intake/Output Summary (Last 24 hours) at 3/20/2025 0943  Last data filed at 3/20/2025 0900  Gross per 24 hour   Intake 3643.33 ml   Output 3300 ml   Net 343.33 ml     Labs:   CBC:   Recent Labs     03/20/25  0541 03/19/25  0419 03/17/25  1634   WBC 5.8 5.7 12.8*   HGB 12.0 12.0 14.2   HCT 34.1* 33.5* 38.8    250 378   MCV 90 88 85     CMP:   Recent Labs     03/20/25  0846 03/20/25  0541 03/20/25 0229   * 120* 119*   K 4.1 4.2 3.9   CL 93* 92* 91*   CO2 22 20* 20*   ANIONGAP 10 12 12   BUN 5* 6 7   CREATININE 0.61 0.61 0.64   EGFR >90 >90 >90   GLUCOSE 98 103* 103*     Recent Labs     03/20/25  0846 03/20/25  0541 03/20/25  0229 03/17/25  1634 09/27/24 2013   ALBUMIN 4.0 3.8 3.7 4.9 5.1*   ALKPHOS  --   --   --  104 71   ALT  --   --   --  15 18   AST  --   --   --  16 18   BILITOT  --   --   --  0.5 0.4   LIPASE  --   --   --   --   --     < > = values in this interval not displayed.     Calcium/Phos:   Lab Results   Component Value Date    CALCIUM 8.6 03/20/2025    PHOS 2.2 (L) 03/20/2025      COAG:   Recent Labs     03/17/25  1634 09/19/24  1606 10/26/18  1503   INR 1.0 1.0 1.0     ENDO:   Recent Labs     03/17/25  2050 04/17/24  1117 04/11/24  0524 02/07/24  1117 12/14/23  1435 09/18/23  0513   TSH 25.97* 54.60* 117.00*  --   --  43.42*   HGBA1C  --   --   --  5.5 5.3  --      URINE:   Recent Labs     03/18/25  1051 03/18/25  0830 03/17/25  1726 03/17/25  1649   OSMOLALITY  --  245* 232*  --    OSMURSPOT 422  --   --  390   SODIUMURR 39  --   --  57   NACREATUR 50  --   --  98      Micro/ID:   Urine culture pending    Imaging:  XR chest 1 view  Result Date: 3/17/2025  No acute cardiopulmonary process.    Meds:  Scheduled  medications  amLODIPine, 10 mg, oral, Daily  budesonide, 0.5 mg, nebulization, BID  busPIRone, 15 mg, oral, BID  desvenlafaxine, 100 mg, oral, Daily  DULoxetine, 30 mg, oral, Daily  enoxaparin, 40 mg, subcutaneous, q24h  fenofibrate, 160 mg, oral, Daily  influenza, 0.5 mL, intramuscular, During hospitalization  formoterol, 20 mcg, nebulization, BID  gabapentin, 600 mg, oral, TID  lamoTRIgine, 75 mg, oral, Daily  levothyroxine, 150 mcg, oral, Daily  lisinopril, 40 mg, oral, Daily  mirtazapine, 15 mg, oral, Nightly  nitrofurantoin (macrocrystal-monohydrate), 100 mg, oral, q12h DWAIN  [Held by provider] Non-Formulary Medication, 1 each, oral, Daily  OXcarbazepine, 300 mg, oral, BID  pantoprazole, 40 mg, oral, Daily before breakfast  prazosin, 1 mg, oral, Nightly  thiamine, 100 mg, oral, Daily    PRN medications  PRN medications: albuterol, butalbital-acetaminophen-caff, meclizine, ondansetron, oral hydration, traZODone, trimethobenzamide     Assessment   Erica Machado is a 59 y.o. female presenting for encephalopathy and seizures and admitted to the ICU for hyponatremia. 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 Transferred from [place] for ICU management of sodium of 108. Sodium improving on fluid restriction s/p 3 infusions of hypertonic saline.     Neuro  # Encephalopathy - resolved  # Witnessed seizures at home  :: Suspect secondary to hyponatremia  - See  for management of hyponatremia  - Lactate normal  - Consult neurology for medication optimization  - Per neuro, recommend tapering oxcarbazepine by halving dose every 2 weeks beginning today  - Start lacosamide 50mg twice daily  - Increase duloxetine and lamotrigine    #  Migraines  :: On abortive treatment with rimegepant  - Current headache, restart home fioricet q8hr PRN  - Follow up scheduled with neurology (Brina) in 5/2025  - Consult neurology for medication optimization     Pulm  # Asthma  - Continue home fomoterol 2 times daily  - Continue home budesonide 2 times daily  - Continue PRN albuterol     Cardiovascular  # HLD  - Continue home fenfofibrate    # HTN  - Continue home amlodipine 10 and lisinopril 40     GI  # GERD  - Continue home pantoprazole 40     Renal/  # Hyponatremia - improving  :: Most consistent with SIADH  - Free water restrict 1200cc daily, encourage electrolyte drinks  - Goal sodium 128 by 2100 on 3/20  - 1200 RFP, if stable Q6hr RFP otherwise Q4hr     # UTI  - UA with reflex culture pending  - Nitrofurantoin 100mg twice daily for 5 days total final dose 3/22     Endo  # Hypothyroidism  - Continue home levothyroxine     ID  No acute issues     Heme  No acute issues     MSK/Rheum  No acute issues     Derm  No acute issues     Psych  # Schizoaffective disorder  # Bipolar disorder  - Hold home aripiprazole 400mg 28 days  - Continue Brexpiprazole  - Increase home lamotrigine to 100mg daily     # MDD with previous suicide attempt  # SANDEEP  - Continue buspar 30mg twice daily  - Hold vilazodone per psychiatry  - Increase duloxetine to 60mg daily per neuro  - Continue home mirtazapine  - Stop zolpidem     # Medication overdose with unknown intent  :: Low risk of suicide attempt  - Psychiatry consult appreciate recs     # Insomnia  - Change trazodone from scheduled to PRN     F PO electrolyte solution  E Replete PRN, goal Na 126-128 by 2100 on 3/18  N Regular carb controlled fluid restrict  G None  DVT: Enoxaparin  Abx: Nitrofurantoin 100mg twice daily for 5 days (3/18-3/22)  Lines: PIV 20g antecubital   Drips: None  Pressors: None  Code status: Full Code  NoK: Morgan Pratt, Home Phone: [phone]     Dispo: Transfer to ICU from [place] for hyponatremia. Transfer  to floors once sodium above 120 sustainably. Lives at home with sister.    Beto Prince MD MS  PGY2 Internal Medicine

## 2025-03-20 NOTE — PROGRESS NOTES
Physical Therapy    Physical Therapy Evaluation    Patient Name: Erica Machado  MRN: [id number]  Department: [facility] ICU  Room: 07/07-A  Today's Date: 3/20/2025   Time Calculation  Start Time: 1105  Stop Time: 1118  Time Calculation (min): 13 min    Assessment/Plan   PT Assessment  Rehab Prognosis: Excellent  Barriers to Discharge Home: No anticipated barriers  Evaluation/Treatment Tolerance: Patient tolerated treatment well  Medical Staff Made Aware: Yes  Strengths: Ability to acquire knowledge, Housing layout, Support of Caregivers  Barriers to Participation: Comorbidities  End of Session Communication: Bedside nurse  Assessment Comment: Patient tolerates mobility well, chronic pain from previous knee and ankle injury (2 yrs ago), currently with headache however RN provided with medication and PT provided hot pack which seem to be helping with relief. Patient does not require skilled PT intervention at this time. Has strong home going support, has 2WW but does not use.  End of Session Patient Position: Up in chair, Alarm off, not on at start of session (no alarm necessary per staff, all needs within reach)  IP OR SWING BED PT PLAN  Inpatient or Swing Bed: Inpatient  PT Plan  PT Plan: PT Eval only  PT Eval Only Reason: At baseline function  PT Discharge Recommendations: No further acute PT  Equipment Recommended upon Discharge: Wheeled walker (owns if necessary)  PT Recommended Transfer Status: Assist x1  PT - OK to Discharge: Yes (per PT POC)    Subjective   General Visit Information:  General  Reason for Referral: Impaired functional mobility; hyponatremia  Referred By: Beto Prince MD  Past Medical History Relevant to Rehab: bipolar disorder, schizoaffective disorder, asthma, COPD, GERD, MDD, hyperlipidemia, seizure disorder, migraines, suicide attempt with acetaminophen, barbituate and alcohol abuse, and hypothyroidism  Family/Caregiver Present: No  Prior to Session Communication: Bedside nurse  Patient  Position Received: Bed, 3 rail up, Alarm off, not on at start of session  General Comment: Patient pleasant, cooperative and agreeable to PT eval  Home Living:  Home Living  Type of Home: House  Lives With: Siblings (Sister, nephew)  Home Adaptive Equipment: Walker rolling or standard (2WW)  Home Layout: Two level, Able to live on main level with bedroom/bathroom  Home Access: Stairs to enter with rails  Entrance Stairs-Rails: Right  Entrance Stairs-Number of Steps: 4  Bathroom Shower/Tub: Tub/shower unit  Bathroom Equipment: Grab bars in shower, Shower chair with back  Prior Level of Function:  Prior Function Per Pt/Caregiver Report  Level of [place]: Independent with ADLs and functional transfers, Independent with homemaking with ambulation  Receives Help From: Family  ADL Assistance: Independent  Homemaking Assistance: Independent (Family assist)  Ambulatory Assistance: Independent (has used walker in the past d/t previous injuries; does not use recently)  Precautions:  Precautions  Medical Precautions: Fall precautions (tele, cortez)  Precautions Comment: Sodium 120, cleared with resident, ok to mobilize      Date/Time Vitals Session Patient Position Pulse Resp SpO2 BP MAP (mmHg)    03/20/25 1000 --  --  82  19  96 %  134/78  87     03/20/25 1100 --  --  83  25  96 %  127/78  89                 Objective   Pain:  Pain Assessment  Pain Assessment: 0-10  0-10 (Numeric) Pain Score: 2  Pain Type: Chronic pain, Acute pain  Pain Location:  (Head, knee and ankle)  Pain Interventions: Heat applied, Repositioned, Ambulation/increased activity (hot packs provided for HA)  Response to Interventions: Resting quietly (RN aware)  Cognition:  Cognition  Overall Cognitive Status: Within Functional Limits  Orientation Level: Oriented X4    General Assessments:    Activity Tolerance  Endurance: Tolerates 10 - 20 min exercise with multiple rests    Sensation  Light Touch: No apparent deficits    Strength  Strength Comments:  Functionally B LE 4/5    Coordination  Movements are Fluid and Coordinated: Yes    Postural Control  Postural Control: Within Functional Limits    Static Sitting Balance  Static Sitting-Balance Support: No upper extremity supported, Feet supported  Static Sitting-Level of Assistance: Independent  Dynamic Sitting Balance  Dynamic Sitting-Balance Support: No upper extremity supported, Feet supported  Dynamic Sitting-Level of Assistance: Independent  Dynamic Sitting-Balance: Forward lean    Static Standing Balance  Static Standing-Balance Support: No upper extremity supported  Static Standing-Level of Assistance: Independent  Dynamic Standing Balance  Dynamic Standing-Balance Support: No upper extremity supported  Dynamic Standing-Level of Assistance: Close supervision  Dynamic Standing-Balance: Turning  Functional Assessments:  ADL  ADL's Addressed: Yes (Independently adjusts socks sitting EOB)    Bed Mobility  Bed Mobility: Yes  Bed Mobility 1  Bed Mobility 1: Supine to sitting  Level of Assistance 1: Independent    Transfers  Transfer: Yes  Transfer 1  Transfer From 1: Sit to, Stand to  Transfer to 1: Sit, Stand  Technique 1: Sit to stand, Stand to sit  Transfer Level of Assistance 1: Independent    Ambulation/Gait Training  Ambulation/Gait Training Performed: Yes  Ambulation/Gait Training 1  Surface 1: Level tile  Device 1: No device  Assistance 1: Close supervision  Quality of Gait 1:  (steady gait, PT to manage lines and cortez)  Comments/Distance (ft) 1: 15'    Outcome Measures:  [facility] Basic Mobility  Turning from your back to your side while in a flat bed without using bedrails: None  Moving from lying on your back to sitting on the side of a flat bed without using bedrails: None  Moving to and from bed to chair (including a wheelchair): None  Standing up from a chair using your arms (e.g. wheelchair or bedside chair): None  To walk in hospital room: A little  Climbing 3-5 steps with railing: A little  Basic  Mobility - Total Score: 22        Education Documentation  Mobility Training, taught by Candy Saini PT at 3/20/2025 11:41 AM.  Learner: Patient  Readiness: Acceptance  Method: Explanation  Response: Verbalizes Understanding  Comment: Importance of staff assist for all mobility, increasing time out of bed    Education Comments  No comments found.

## 2025-03-20 NOTE — CONSULTS
"Inpatient consult to Neurology  Consult performed by: Manuel Simon MD  Consult ordered by: Unruly Fuentes MD          History Of Present Illness  Erica Machado is a 59 y.o. 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  As far as seizure management, she is treated by her primary neurologist,Dr. Gonsalves ([facility]), currently on lamotrigine 75 mg daily, oxcarbamazepine 300 mg BID, and had been on Vimpat 50 mg- well tolerated, although the patient states her dosing of Vimpat was increased by Dr. [facility], to >200 mg BID- and patient noticed N/V on the higher dosing.  As far as the migraine type headaches, she previously was on topiramate- not helpful, sumatriptan helps sometimes, otherwise takes butalbital compound- prescribed 24 tablets per month.  She has not been tried on all of the CGRPs, such as the monoclonal antibody injectables, Qulipta.  She was hospitalized for several days of headaches in 12/24, at [facility], had a normal MRI of the brain, normal MRA and MRV if the intracranial circulation, and her lumbar puncture was not not suggestive of meningitis- results were reviewed.  Past Medical History  Past Medical History:   Diagnosis Date    Abnormal computerized axial tomography of abdomen 03/19/2025    Abnormal CT of the abdomen 03/19/2025    Acute bronchitis 03/19/2025    Agoraphobia, unspecified 07/19/2021    Alcohol abuse 10/30/2009    Alcohol use disorder, mild, in early remission 05/26/2021    Anxiety     Cellulitis and abscess of foot, except toes 03/19/2025    COPD (chronic obstructive pulmonary disease) (Multi)     Diverticulosis     Encounter for psychological evaluation 03/19/2025    Comment on above: PSYCH EVAL      GERD (gastroesophageal reflux disease)     HLD (hyperlipidemia)     Hypertension     Hypothyroidism     Insomnia     Intentional " overdose (Multi) 03/19/2025    Comment on above: OVERDOSE      Migraine     Osteoarthritis     Other specified anxiety disorders 01/03/2022    Depression with anxiety    Schizoaffective disorder (Multi)     Seizure (Multi)     Somatic dysfunction of rib 03/19/2025    Suicide attempt by drug ingestion (Multi) 03/19/2025    Unspecified nonsuppurative otitis media, bilateral 10/10/2023     Surgical History  Past Surgical History:   Procedure Laterality Date    ENDOMETRIAL ABLATION      FOOT SURGERY  10/13/2014    Foot Surgery    FOOT SURGERY       Social History  Social History     Tobacco Use    Smoking status: Former     Current packs/day: 0.50     Types: Cigarettes     Passive exposure: Never    Smokeless tobacco: Never   Vaping Use    Vaping status: Never Used   Substance Use Topics    Alcohol use: Not Currently     Comment: quit drinking 2 years ago    Drug use: Never     Allergies  Codeine, Dulaglutide, Bee pollen, Ferrous sulfate, Oxycodone-acetaminophen, Penicillins, and Sulfa (sulfonamide antibiotics)  Medications Prior to Admission   Medication Sig Dispense Refill Last Dose/Taking    Abilify Maintena 400 mg injection syringe Inject 400 mg into the muscle every 30 (thirty) days.       albuterol 90 mcg/actuation inhaler Inhale two puffs by mouth and into the lungs every 4 hours as directed 18 g 4     amLODIPine (Norvasc) 10 mg tablet Take 1 tablet (10 mg) by mouth once daily. 30 tablet 11     azelastine (Astelin) 137 mcg (0.1 %) nasal spray Administer 1 spray into each nostril 2 times a day.       brexpiprazole (Rexulti) 0.5 mg tablet Take 1 Tablet by mouth once daily. 30 tablet 1     busPIRone (Buspar) 15 mg tablet Take 1 tablet (15 mg) by mouth 2 times a day.       butalbital-acetaminophen-caff -40 mg tablet Take 1 tablet by mouth every 6 hours if needed 24 tablet 5     clarithromycin (Biaxin) 500 mg tablet Take 1 tablet (500 mg) by mouth 2 times a day for 10 days. Take with food 20 tablet 0      DULoxetine (Cymbalta) 30 mg DR capsule Take 1 Capsule by mouth once daily 30 capsule 5     gabapentin (Neurontin) 600 mg tablet Take 1 tablet (600 mg) by mouth 3 times a day. 270 tablet 3     lamoTRIgine (LaMICtal) 25 mg tablet Take 3 tablets by mouth once daily. 90 tablet 0     levothyroxine (Synthroid, Levoxyl) 150 mcg tablet Take 1 tablet (150 mcg) by mouth early in the morning.. 30 tablet 0     lisinopril 40 mg tablet Take 1 tablet (40 mg) by mouth once daily. 30 tablet 5     loperamide (Imodium) 2 mg capsule Take 1 Capsule by mouth 2 (two) times daily as needed for diarrhea 20 capsule 1     mirtazapine (Remeron) 15 mg tablet Take 1 tablet (15 mg) by mouth once daily at bedtime.       mometasone-formoterol (Dulera 200) 200-5 mcg/actuation inhaler Inhale 2 puffs by mouth and into the lungs two times a day. Rinse mouth after each use 13 g 2     OXcarbazepine (Trileptal) 300 mg tablet Take 1 tablet by mouth twice a day. 60 tablet 5     prazosin (Minipress) 1 mg capsule TAKE 1 CAPSULE BY MOUTH AT BEDTIME FOR PTSD/NIGHTMARES/FLASHBACKS/HYPERVIGILANCE 30 capsule 5     sucralfate (Carafate) 1 gram tablet Take 1 tablet (1 g) by mouth 2 times a day before meals. 60 tablet 11     thiamine (Vitamin B-1) 100 mg tablet Take 1 tablet (100 mg) by mouth once daily. 30 tablet 2     traZODone (Desyrel) 100 mg tablet Take 1 tablet (100 mg) by mouth once daily at bedtime. 30 tablet 2     vilazodone (Viibryd) 40 mg tablet Take 1 Tablet by mouth once daily every evening 30 tablet 5     zolpidem (Ambien) 10 mg tablet TAKE 1 TABLET BY MOUTH AT BEDTIME AS NEEDED FOR SLEEP 30 tablet 5        Review of Systems  Neurological Exam  Mental Status: Awake and alert. Oriented to person, place and time. Speech was fluent to history.     Cardiopulmonary: RRR, normal S1,S2, no m/g/r. Lungs CTA w/o adventitious sounds. No respiratory distress.     CN: Pupils were 4/4mm to 2/2mm. VF intact to finger counting. Ocular versions were intact. Facial  "sensation was intact to light touch bilaterally. Facial expression was symmetric. Palate elevated symmetrically. Shoulder shrug was symmetric. Tongue protruded midline.     Motor: Normal muscle bulk and tone. Strength was 5/5 bilaterally for shoulder abduction, elbow flexion/extension,  strength, hip flexion, knee flexion/extension, ankle dorsi- and plantar flexion. There were no abnormal movements.     Sensory: Intact to light touch and temperature in all 4 extremities. Does not extinguish to double simultaneous touch.     Coordination: Finger to nose and heel to shin were intact with no dysmetria.     Reflexes: 2+ bilaterally in biceps, brachioradialis, patella, and achilles. Toes were downgoing bilaterally.    Gait: not evaluated due to patient being in the ICU  Physical Exam  Last Recorded Vitals  Blood pressure 124/69, pulse 75, temperature 36.1 °C (97 °F), resp. rate 15, height 1.753 m (5' 9.02\"), weight 94.8 kg (208 lb 15.9 oz), SpO2 94%.    Relevant Results                    Chance Coma Scale  Best Eye Response: Spontaneous  Best Verbal Response: Oriented  Best Motor Response: Follows commands  Chance Coma Scale Score: 15                 I have personally reviewed the following imaging results ECG 12 Lead    Result Date: 3/19/2025  Normal sinus rhythm Moderate voltage criteria for LVH, may be normal variant ( R in aVL , Gordon product ) QTcB >= 480 msec Abnormal ECG When compared with ECG of 27-SEP-2024 20:22, Incomplete right bundle branch block is no longer Present T wave inversion now evident in Inferior leads See ED provider note for full interpretation and clinical correlation Confirmed by Samantha Nolan ([id number]) on 3/19/2025 2:12:14 PM    ECG 12 lead    Result Date: 3/18/2025  Sinus rhythm with 1st degree AV block Prolonged QT Abnormal ECG When compared with ECG of 17-MAR-2025 15:39, (unconfirmed) SD interval has increased Non-specific change in ST segment in Inferior leads T wave inversion " no longer evident in Inferior leads    XR chest 1 view    Result Date: 3/17/2025  Interpreted By:  Mauricio Langford, STUDY: XR CHEST 1 VIEW;  3/17/2025 8:34 pm   INDICATION: Signs/Symptoms:Hyponatremia.     COMPARISON: 04/12/2024   ACCESSION NUMBER(S): [id number]   ORDERING CLINICIAN: MARI ALVARADO   FINDINGS:     The cardiomediastinal silhouette and pulmonary vasculature are within normal limits.   No consolidation, pleural effusion or pneumothorax.         No acute cardiopulmonary process.     MACRO: None.   Signed by: Mauricio Langford 3/17/2025 8:47 PM Dictation workstation:   [id number]  . reviewed     Assessment/Plan   Assessment & Plan  Hyponatremia    Schizoaffective disorder (Multi)    Alcohol use disorder, severe, in sustained remission (Multi)    MDD (major depressive disorder), recurrent, in full remission (CMS-HCC)    SANDEEP (generalized anxiety disorder)    Psychogenic nonepileptic seizure      Impression: for the likely PNES, recommend resuming the Vimpat at 50 mg twice a day.  For the headaches, recommend increasing the duloxetine to 60 mg a day.  I would taper off the oxcarbamazepine: decrease the dosing to 150 mg twice a day for 2 weeks, then decrease as an outpatient to 75 mg twice a day and discontinue it after 2 weeks.  She can be discharged anytime she is medically stable, follow up with her Neurologist, Dr. Gonsalves in May.       I spent 60 minutes in the professional and overall care of this patient.      Manuel Simon MD

## 2025-03-20 NOTE — CARE PLAN
"The patient's goals for the shift include \" sleep\"     The clinical goals for the shift include sodium levels will increase by am         "

## 2025-03-20 NOTE — CARE PLAN
The patient's goals for the shift include  relief of headache    The clinical goals for the shift include Sodium level will be 124 by end of shift      Problem: Pain - Adult  Goal: Verbalizes/displays adequate comfort level or baseline comfort level  Outcome: Progressing     Problem: Discharge Planning  Goal: Discharge to home or other facility with appropriate resources  Outcome: Progressing     Problem: Chronic Conditions and Co-morbidities  Goal: Patient's chronic conditions and co-morbidity symptoms are monitored and maintained or improved  Outcome: Progressing

## 2025-03-20 NOTE — CONSULTS
Pulmonary Disease Navigator      This RT to see patient for COPD consult. The patient was given a COPD booklet with educational materials regarding pulmonary issues.    [employer] support group discussed. Flyer given for next month's meeting.   I educated patient about the disease process and how it affected the lungs making it difficult to breathe. We discussed current medications and if their current medications give them relief.    [facility] discussed with patient and flyer given.       ZAIN DIAS, RRT

## 2025-03-20 NOTE — ASSESSMENT & PLAN NOTE
Plan:    1) Continue other psychiatric medications as prescribed outpatient  2) Discontinued Vilazodone 40 mg Qdaily (due to current hyponatremia) (3-)  3) Changed Trazodone 100 mg at bedtime to PRN (3-)  4) May discontinue 1-to-1 sitter (3-)  5) Will follow.

## 2025-03-20 NOTE — PROGRESS NOTES
"Erica Machado is a 59 y.o. year old female patient who is on U admission day 2.      Subjective   Erica Machado is a 59 y.o. year old female patient who was personally seen and interviewed, and discussed with her ICU medical resident. The patient was interviewed alone in her room (interviewed reclined up in bed), and was easily engaged and cooperative. This morning, Erica reports feeling \"better\" and currently rates her depression at a 3 out of 10. No suicidal ideation or suicidal plans were elicited. She also rates her anxiety at a 5 out of 10, mostly due to a relative who is currently hospitalized and is not improving medically. No hallucinations or paranoia were endorsed or noted.           Objective   Mental Status Exam:   General: Appropriately groomed and dressed in hospital attire.   Appearance: Appears stated age.   Attitude: Calm, cooperative.   Behavior: Appropriate eye contact.   Motor Activity: No agitation or retardation. No EPS/TD. Normal gait and station. Normal muscle tone and bulk.   Speech: Regular rate, rhythm, volume and tone, spontaneous, fluent. Non-pressured.   Mood: \"Better\"   Affect: Neutral.   Thought Process: Organized, and goal directed.   Thought Content: Does not currently endorse suicidal ideation or any suicide plans.   Does not endorse homicidal ideation.  No overt delusions or paranoia elicited.    Thought Perception: Does not endorse auditory or visual hallucinations, does not appear to be responding to hallucinatory stimuli.   Cognition: Alert, oriented x 3. No deficits noted. Adequate fund of knowledge. No deficit in recent and remote memory. No deficits in attention, concentration or language.   Insight: Fair-to-good, as patient recognizes symptoms of illness and need for recommended treatments.    Judgment: Intact, as patient can make reasonable decisions about ordinary activities of daily living and necessary medical care recommendations. "           LABS:  Results for orders placed or performed during the hospital encounter of 03/18/25 (from the past 96 hours)   Renal Function Panel   Result Value Ref Range    Glucose 98 74 - 99 mg/dL    Sodium 116 (LL) 136 - 145 mmol/L    Potassium 3.1 (L) 3.5 - 5.3 mmol/L    Chloride 84 (L) 98 - 107 mmol/L    Bicarbonate 22 21 - 32 mmol/L    Anion Gap 13 10 - 20 mmol/L    Urea Nitrogen 9 6 - 23 mg/dL    Creatinine 0.63 0.50 - 1.05 mg/dL    eGFR >90 >60 mL/min/1.73m*2    Calcium 8.3 (L) 8.6 - 10.3 mg/dL    Phosphorus 3.3 2.5 - 4.9 mg/dL    Albumin 4.1 3.4 - 5.0 g/dL   Osmolality   Result Value Ref Range    Osmolality, Serum 245 (L) 280 - 300 mOsm/kg   Lactate   Result Value Ref Range    Lactate 0.8 0.4 - 2.0 mmol/L   ECG 12 lead   Result Value Ref Range    Ventricular Rate 68 BPM    Atrial Rate 68 BPM    NV Interval 218 ms    QRS Duration 96 ms    QT Interval 474 ms    QTC Calculation(Bazett) 504 ms    P Axis 68 degrees    R Axis 28 degrees    T Axis 66 degrees    QRS Count 11 beats    Q Onset 224 ms    P Onset 115 ms    P Offset 182 ms    T Offset 461 ms    QTC Fredericia 494 ms   Osmolality, urine   Result Value Ref Range    Osmolality, Urine Random 422 200 - 1,200 mOsm/kg   Urine electrolytes   Result Value Ref Range    Sodium, Urine Random 39 mmol/L    Sodium/Creatinine Ratio 50 Not established. mmol/g Creat    Potassium, Urine Random 36 mmol/L    Potassium/Creatinine Ratio 46 Not established mmol/g Creat    Chloride, Urine Random 46 mmol/L    Chloride/Creatinine Ratio 58 38 - 318 mmol/g creat    Creatinine, Urine Random 78.7 20.0 - 320.0 mg/dL   Albumin-Creatinine Ratio, Urine Random   Result Value Ref Range    Albumin, Urine Random 101.1 Not established mg/L    Creatinine, Urine Random 78.7 20.0 - 320.0 mg/dL    Albumin/Creatinine Ratio 128.5 (H) <30.0 ug/mg Creat   Renal function panel   Result Value Ref Range    Glucose 97 74 - 99 mg/dL    Sodium 114 (LL) 136 - 145 mmol/L    Potassium 3.2 (L) 3.5 - 5.3 mmol/L     Chloride 84 (L) 98 - 107 mmol/L    Bicarbonate 21 21 - 32 mmol/L    Anion Gap 12 10 - 20 mmol/L    Urea Nitrogen 8 6 - 23 mg/dL    Creatinine 0.49 (L) 0.50 - 1.05 mg/dL    eGFR >90 >60 mL/min/1.73m*2    Calcium 8.1 (L) 8.6 - 10.3 mg/dL    Phosphorus 2.9 2.5 - 4.9 mg/dL    Albumin 4.0 3.4 - 5.0 g/dL   Renal function panel   Result Value Ref Range    Glucose 123 (H) 74 - 99 mg/dL    Sodium 112 (LL) 136 - 145 mmol/L    Potassium 2.8 (LL) 3.5 - 5.3 mmol/L    Chloride 84 (L) 98 - 107 mmol/L    Bicarbonate 20 (L) 21 - 32 mmol/L    Anion Gap 11 10 - 20 mmol/L    Urea Nitrogen 9 6 - 23 mg/dL    Creatinine 0.55 0.50 - 1.05 mg/dL    eGFR >90 >60 mL/min/1.73m*2    Calcium 8.0 (L) 8.6 - 10.3 mg/dL    Phosphorus 2.4 (L) 2.5 - 4.9 mg/dL    Albumin 4.1 3.4 - 5.0 g/dL   Renal function panel   Result Value Ref Range    Glucose 91 74 - 99 mg/dL    Sodium 113 (LL) 136 - 145 mmol/L    Potassium 3.9 3.5 - 5.3 mmol/L    Chloride 85 (L) 98 - 107 mmol/L    Bicarbonate 21 21 - 32 mmol/L    Anion Gap 11 10 - 20 mmol/L    Urea Nitrogen 9 6 - 23 mg/dL    Creatinine 0.62 0.50 - 1.05 mg/dL    eGFR >90 >60 mL/min/1.73m*2    Calcium 8.0 (L) 8.6 - 10.3 mg/dL    Phosphorus 2.5 2.5 - 4.9 mg/dL    Albumin 3.9 3.4 - 5.0 g/dL   Renal Function Panel   Result Value Ref Range    Glucose 101 (H) 74 - 99 mg/dL    Sodium 114 (LL) 136 - 145 mmol/L    Potassium 3.7 3.5 - 5.3 mmol/L    Chloride 86 (L) 98 - 107 mmol/L    Bicarbonate 21 21 - 32 mmol/L    Anion Gap 11 10 - 20 mmol/L    Urea Nitrogen 10 6 - 23 mg/dL    Creatinine 0.64 0.50 - 1.05 mg/dL    eGFR >90 >60 mL/min/1.73m*2    Calcium 8.2 (L) 8.6 - 10.3 mg/dL    Phosphorus 2.5 2.5 - 4.9 mg/dL    Albumin 3.9 3.4 - 5.0 g/dL   Cortisol AM   Result Value Ref Range    Cortisol  A.M. 28.4 (H) 5.0 - 20.0 ug/dL   Magnesium   Result Value Ref Range    Magnesium 1.76 1.60 - 2.40 mg/dL   CBC   Result Value Ref Range    WBC 5.7 4.4 - 11.3 x10*3/uL    nRBC 0.0 0.0 - 0.0 /100 WBCs    RBC 3.79 (L) 4.00 - 5.20 x10*6/uL     Hemoglobin 12.0 12.0 - 16.0 g/dL    Hematocrit 33.5 (L) 36.0 - 46.0 %    MCV 88 80 - 100 fL    MCH 31.7 26.0 - 34.0 pg    MCHC 35.8 32.0 - 36.0 g/dL    RDW 13.2 11.5 - 14.5 %    Platelets 250 150 - 450 x10*3/uL   Renal Function Panel   Result Value Ref Range    Glucose 107 (H) 74 - 99 mg/dL    Sodium 116 (LL) 136 - 145 mmol/L    Potassium 3.7 3.5 - 5.3 mmol/L    Chloride 89 (L) 98 - 107 mmol/L    Bicarbonate 20 (L) 21 - 32 mmol/L    Anion Gap 11 10 - 20 mmol/L    Urea Nitrogen 10 6 - 23 mg/dL    Creatinine 0.60 0.50 - 1.05 mg/dL    eGFR >90 >60 mL/min/1.73m*2    Calcium 8.2 (L) 8.6 - 10.3 mg/dL    Phosphorus 2.4 (L) 2.5 - 4.9 mg/dL    Albumin 3.8 3.4 - 5.0 g/dL   Renal function panel   Result Value Ref Range    Glucose 104 (H) 74 - 99 mg/dL    Sodium 115 (LL) 136 - 145 mmol/L    Potassium 3.8 3.5 - 5.3 mmol/L    Chloride 88 (L) 98 - 107 mmol/L    Bicarbonate 21 21 - 32 mmol/L    Anion Gap 10 10 - 20 mmol/L    Urea Nitrogen 9 6 - 23 mg/dL    Creatinine 0.61 0.50 - 1.05 mg/dL    eGFR >90 >60 mL/min/1.73m*2    Calcium 8.5 (L) 8.6 - 10.3 mg/dL    Phosphorus 2.6 2.5 - 4.9 mg/dL    Albumin 4.1 3.4 - 5.0 g/dL   Urinalysis with Reflex Culture and Microscopic   Result Value Ref Range    Color, Urine Yellow Light-Yellow, Yellow, Dark-Yellow    Appearance, Urine Clear Clear    Specific Gravity, Urine 1.019 1.005 - 1.035    pH, Urine 6.5 5.0, 5.5, 6.0, 6.5, 7.0, 7.5, 8.0    Protein, Urine 10 (TRACE) NEGATIVE, 10 (TRACE), 20 (TRACE) mg/dL    Glucose, Urine Normal Normal mg/dL    Blood, Urine NEGATIVE NEGATIVE mg/dL    Ketones, Urine 10 (1+) (A) NEGATIVE mg/dL    Bilirubin, Urine NEGATIVE NEGATIVE mg/dL    Urobilinogen, Urine Normal Normal mg/dL    Nitrite, Urine NEGATIVE NEGATIVE    Leukocyte Esterase, Urine 250 Marianne/uL (A) NEGATIVE   Extra Urine Gray Tube   Result Value Ref Range    Extra Tube Hold for add-ons.    Microscopic Only, Urine   Result Value Ref Range    WBC, Urine >50 (A) 1-5, NONE /HPF    RBC, Urine 3-5 NONE,  1-2, 3-5 /HPF   Renal function panel   Result Value Ref Range    Glucose 159 (H) 74 - 99 mg/dL    Sodium 115 (LL) 136 - 145 mmol/L    Potassium 3.6 3.5 - 5.3 mmol/L    Chloride 88 (L) 98 - 107 mmol/L    Bicarbonate 19 (L) 21 - 32 mmol/L    Anion Gap 12 10 - 20 mmol/L    Urea Nitrogen 8 6 - 23 mg/dL    Creatinine 0.69 0.50 - 1.05 mg/dL    eGFR >90 >60 mL/min/1.73m*2    Calcium 8.0 (L) 8.6 - 10.3 mg/dL    Phosphorus 2.3 (L) 2.5 - 4.9 mg/dL    Albumin 3.9 3.4 - 5.0 g/dL   Renal function panel   Result Value Ref Range    Glucose 103 (H) 74 - 99 mg/dL    Sodium 119 (LL) 136 - 145 mmol/L    Potassium 3.9 3.5 - 5.3 mmol/L    Chloride 91 (L) 98 - 107 mmol/L    Bicarbonate 20 (L) 21 - 32 mmol/L    Anion Gap 12 10 - 20 mmol/L    Urea Nitrogen 7 6 - 23 mg/dL    Creatinine 0.64 0.50 - 1.05 mg/dL    eGFR >90 >60 mL/min/1.73m*2    Calcium 8.1 (L) 8.6 - 10.3 mg/dL    Phosphorus 2.8 2.5 - 4.9 mg/dL    Albumin 3.7 3.4 - 5.0 g/dL   Magnesium   Result Value Ref Range    Magnesium 1.79 1.60 - 2.40 mg/dL   Renal Function Panel   Result Value Ref Range    Glucose 103 (H) 74 - 99 mg/dL    Sodium 120 (LL) 136 - 145 mmol/L    Potassium 4.2 3.5 - 5.3 mmol/L    Chloride 92 (L) 98 - 107 mmol/L    Bicarbonate 20 (L) 21 - 32 mmol/L    Anion Gap 12 10 - 20 mmol/L    Urea Nitrogen 6 6 - 23 mg/dL    Creatinine 0.61 0.50 - 1.05 mg/dL    eGFR >90 >60 mL/min/1.73m*2    Calcium 8.2 (L) 8.6 - 10.3 mg/dL    Phosphorus 2.5 2.5 - 4.9 mg/dL    Albumin 3.8 3.4 - 5.0 g/dL   CBC   Result Value Ref Range    WBC 5.8 4.4 - 11.3 x10*3/uL    nRBC 0.0 0.0 - 0.0 /100 WBCs    RBC 3.78 (L) 4.00 - 5.20 x10*6/uL    Hemoglobin 12.0 12.0 - 16.0 g/dL    Hematocrit 34.1 (L) 36.0 - 46.0 %    MCV 90 80 - 100 fL    MCH 31.7 26.0 - 34.0 pg    MCHC 35.2 32.0 - 36.0 g/dL    RDW 13.4 11.5 - 14.5 %    Platelets 259 150 - 450 x10*3/uL     *Note: Due to a large number of results and/or encounters for the requested time period, some results have not been displayed. A complete set of  results can be found in Results Review.        Last Recorded Vitals  Visit Vitals  /77   Pulse 71   Temp 35.9 °C (96.6 °F) (Temporal)   Resp 16        Intake/Output last 3 Shifts:  No intake/output data recorded.    Relevant Results  Scheduled medications  amLODIPine, 10 mg, oral, Daily  budesonide, 0.5 mg, nebulization, BID  busPIRone, 15 mg, oral, BID  desvenlafaxine, 100 mg, oral, Daily  DULoxetine, 30 mg, oral, Daily  enoxaparin, 40 mg, subcutaneous, q24h  fenofibrate, 160 mg, oral, Daily  influenza, 0.5 mL, intramuscular, During hospitalization  formoterol, 20 mcg, nebulization, BID  gabapentin, 600 mg, oral, TID  lamoTRIgine, 75 mg, oral, Daily  levothyroxine, 150 mcg, oral, Daily  lisinopril, 40 mg, oral, Daily  mirtazapine, 15 mg, oral, Nightly  nitrofurantoin (macrocrystal-monohydrate), 100 mg, oral, q12h DWAIN  [Held by provider] Non-Formulary Medication, 1 each, oral, Daily  OXcarbazepine, 300 mg, oral, BID  pantoprazole, 40 mg, oral, Daily before breakfast  prazosin, 1 mg, oral, Nightly  thiamine, 100 mg, oral, Daily      Continuous medications     PRN medications  PRN medications: albuterol, meclizine, ondansetron, oxyCODONE-acetaminophen, traZODone, trimethobenzamide               Assessment/Plan   Assessment & Plan  Schizoaffective disorder (Multi)  Plan:    1) Continue other psychiatric medications as prescribed outpatient  2) Discontinued Vilazodone 40 mg Qdaily (due to current hyponatremia) (3-)  3) Changed Trazodone 100 mg at bedtime to PRN (3-)  4) May discontinue 1-to-1 sitter (3-)  5) Will follow.  MDD (major depressive disorder), recurrent, in full remission (CMS-HCC)  Plan: 1) see above    SANDEEP (generalized anxiety disorder)  Plan: 1) see above    Psychogenic nonepileptic seizure  Plan: 1) see above    Alcohol use disorder, severe, in sustained remission (Multi)  Plan: 1) see above    Hyponatremia  Plan: 1) Primary team to follow and manage.      JHONNY Amaya MD

## 2025-03-21 PROBLEM — E03.9 HYPOTHYROIDISM: Status: ACTIVE | Noted: 2021-06-03

## 2025-03-21 PROBLEM — R44.1 VISUAL HALLUCINATIONS: Status: ACTIVE | Noted: 2018-08-08

## 2025-03-21 PROBLEM — G25.81 RESTLESS LEGS SYNDROME: Status: ACTIVE | Noted: 2018-10-10

## 2025-03-21 PROBLEM — R74.8 ELEVATED CK: Status: RESOLVED | Noted: 2020-06-20 | Resolved: 2025-03-21

## 2025-03-21 PROBLEM — T14.8XXA LOCAL INFECTION OF WOUND: Status: RESOLVED | Noted: 2025-03-21 | Resolved: 2025-03-21

## 2025-03-21 PROBLEM — E66.811 OBESITY, CLASS I, BMI 30-34.9: Status: RESOLVED | Noted: 2022-05-23 | Resolved: 2025-03-21

## 2025-03-21 PROBLEM — R73.03 PREDIABETES: Status: RESOLVED | Noted: 2023-05-19 | Resolved: 2025-03-21

## 2025-03-21 PROBLEM — J30.2 SEASONAL ALLERGIC RHINITIS: Status: ACTIVE | Noted: 2025-03-21

## 2025-03-21 PROBLEM — J45.909 ASTHMA: Status: RESOLVED | Noted: 2017-11-17 | Resolved: 2025-03-21

## 2025-03-21 PROBLEM — K57.90 DIVERTICULOSIS: Status: ACTIVE | Noted: 2024-04-01

## 2025-03-21 PROBLEM — F17.210 CIGARETTE NICOTINE DEPENDENCE: Status: RESOLVED | Noted: 2025-03-21 | Resolved: 2025-03-21

## 2025-03-21 PROBLEM — R11.2 NAUSEA AND VOMITING: Status: ACTIVE | Noted: 2024-04-01

## 2025-03-21 PROBLEM — F32.9 MAJOR DEPRESSIVE DISORDER, SINGLE EPISODE, UNSPECIFIED: Status: ACTIVE | Noted: 2025-03-20

## 2025-03-21 PROBLEM — J45.40 MODERATE PERSISTENT ASTHMA, UNCOMPLICATED (HHS-HCC): Status: ACTIVE | Noted: 2017-11-17

## 2025-03-21 PROBLEM — Z20.822 SUSPECTED SEVERE ACUTE RESPIRATORY SYNDROME CORONAVIRUS 2 (SARS-COV-2) INFECTION: Status: RESOLVED | Noted: 2023-01-23 | Resolved: 2025-03-21

## 2025-03-21 PROBLEM — R19.7 DIARRHEA: Status: ACTIVE | Noted: 2024-04-21

## 2025-03-21 PROBLEM — R55 SYNCOPE AND COLLAPSE: Status: RESOLVED | Noted: 2018-10-26 | Resolved: 2025-03-21

## 2025-03-21 PROBLEM — E11.9 TYPE 2 DIABETES MELLITUS WITHOUT COMPLICATION: Chronic | Status: ACTIVE | Noted: 2024-03-11

## 2025-03-21 PROBLEM — I61.9 CEREBRAL HEMORRHAGE (MULTI): Status: ACTIVE | Noted: 2020-08-14

## 2025-03-21 PROBLEM — D50.9 IRON DEFICIENCY ANEMIA: Status: RESOLVED | Noted: 2025-03-21 | Resolved: 2025-03-21

## 2025-03-21 PROBLEM — G44.209 TENSION HEADACHE: Status: ACTIVE | Noted: 2025-03-21

## 2025-03-21 PROBLEM — J30.89 ALLERGIC RHINITIS CAUSED BY FEATHERS: Status: RESOLVED | Noted: 2025-03-21 | Resolved: 2025-03-21

## 2025-03-21 PROBLEM — M25.562 CHRONIC PAIN OF LEFT KNEE: Status: ACTIVE | Noted: 2023-10-27

## 2025-03-21 PROBLEM — S22.42XA CLOSED FRACTURE OF MULTIPLE RIBS OF LEFT SIDE: Status: RESOLVED | Noted: 2025-01-17 | Resolved: 2025-03-21

## 2025-03-21 PROBLEM — E78.2 MIXED HYPERLIPIDEMIA: Status: ACTIVE | Noted: 2021-06-03

## 2025-03-21 PROBLEM — A86: Status: RESOLVED | Noted: 2024-12-28 | Resolved: 2025-03-21

## 2025-03-21 PROBLEM — D64.9 ANEMIA: Status: RESOLVED | Noted: 2021-07-19 | Resolved: 2025-03-21

## 2025-03-21 PROBLEM — E55.9 VITAMIN D DEFICIENCY: Status: ACTIVE | Noted: 2021-07-19

## 2025-03-21 PROBLEM — M54.12 CERVICAL RADICULITIS: Status: ACTIVE | Noted: 2025-03-21

## 2025-03-21 PROBLEM — G43.909 MIGRAINE HEADACHE: Status: RESOLVED | Noted: 2021-06-03 | Resolved: 2025-03-21

## 2025-03-21 PROBLEM — M54.9 CHRONIC BACK PAIN: Status: ACTIVE | Noted: 2025-03-19

## 2025-03-21 PROBLEM — M25.511 RIGHT SHOULDER PAIN: Status: ACTIVE | Noted: 2025-03-21

## 2025-03-21 PROBLEM — F17.200 SMOKING: Status: RESOLVED | Noted: 2018-08-08 | Resolved: 2025-03-21

## 2025-03-21 PROBLEM — E87.1 HYPONATREMIA: Status: RESOLVED | Noted: 2024-06-15 | Resolved: 2025-03-21

## 2025-03-21 PROBLEM — R42 DIZZINESS AND GIDDINESS: Status: ACTIVE | Noted: 2018-10-26

## 2025-03-21 PROBLEM — N39.0 URINARY TRACT INFECTION: Status: RESOLVED | Noted: 2025-03-21 | Resolved: 2025-03-21

## 2025-03-21 PROBLEM — G43.909 MIGRAINE HEADACHE: Status: ACTIVE | Noted: 2021-06-03

## 2025-03-21 PROBLEM — R05.9 COUGH: Status: ACTIVE | Noted: 2021-01-12

## 2025-03-21 PROBLEM — G40.909 EPILEPSY WITHOUT STATUS EPILEPTICUS, NOT INTRACTABLE: Status: ACTIVE | Noted: 2018-10-29

## 2025-03-21 PROBLEM — E51.9 VITAMIN B1 DEFICIENCY: Status: RESOLVED | Noted: 2025-03-21 | Resolved: 2025-03-21

## 2025-03-21 PROBLEM — E78.00 PURE HYPERCHOLESTEROLEMIA, UNSPECIFIED: Status: ACTIVE | Noted: 2018-08-08

## 2025-03-21 PROBLEM — L03.90 CELLULITIS: Status: RESOLVED | Noted: 2025-03-21 | Resolved: 2025-03-21

## 2025-03-21 PROBLEM — H65.93 BILATERAL NON-SUPPURATIVE OTITIS MEDIA: Status: ACTIVE | Noted: 2023-10-10

## 2025-03-21 PROBLEM — M47.816 LUMBAR SPONDYLOSIS: Status: ACTIVE | Noted: 2021-01-14

## 2025-03-21 PROBLEM — R29.898 UPPER EXTREMITY WEAKNESS: Status: ACTIVE | Noted: 2021-01-14

## 2025-03-21 PROBLEM — K21.9 GASTROESOPHAGEAL REFLUX DISEASE WITHOUT ESOPHAGITIS: Status: ACTIVE | Noted: 2021-07-19

## 2025-03-21 PROBLEM — E53.8 VITAMIN B12 DEFICIENCY: Status: RESOLVED | Noted: 2025-03-21 | Resolved: 2025-03-21

## 2025-03-21 PROBLEM — G89.29 CHRONIC BACK PAIN: Status: ACTIVE | Noted: 2025-03-19

## 2025-03-21 PROBLEM — E87.6 HYPOKALEMIA: Status: RESOLVED | Noted: 2018-07-13 | Resolved: 2025-03-21

## 2025-03-21 PROBLEM — R09.81 CONGESTION OF NASAL SINUS: Status: ACTIVE | Noted: 2025-03-21

## 2025-03-21 PROBLEM — J44.9 CHRONIC OBSTRUCTIVE PULMONARY DISEASE (MULTI): Status: ACTIVE | Noted: 2021-07-19

## 2025-03-21 PROBLEM — G62.9 PERIPHERAL NERVE DISORDER: Status: ACTIVE | Noted: 2025-01-17

## 2025-03-21 PROBLEM — F32.9 MAJOR DEPRESSIVE DISORDER WITHOUT PSYCHOTIC FEATURES: Status: ACTIVE | Noted: 2020-04-12

## 2025-03-21 PROBLEM — L97.509 ULCER OF FOOT (MULTI): Status: ACTIVE | Noted: 2025-03-21

## 2025-03-21 PROBLEM — R09.82 POSTNASAL DISCHARGE: Status: ACTIVE | Noted: 2025-03-21

## 2025-03-21 PROBLEM — M47.812 CERVICAL SPONDYLOSIS WITHOUT MYELOPATHY: Status: ACTIVE | Noted: 2021-01-14

## 2025-03-21 PROBLEM — R10.31 RIGHT LOWER QUADRANT ABDOMINAL PAIN: Status: ACTIVE | Noted: 2024-04-01

## 2025-03-21 PROBLEM — T74.21XA: Status: ACTIVE | Noted: 2017-11-19

## 2025-03-21 PROBLEM — J34.2 DEVIATED NASAL SEPTUM: Status: ACTIVE | Noted: 2025-03-21

## 2025-03-21 PROBLEM — I61.9 INTRACEREBRAL HEMORRHAGE (MULTI): Chronic | Status: RESOLVED | Noted: 2020-08-14 | Resolved: 2025-03-21

## 2025-03-21 PROBLEM — G89.29 CHRONIC PAIN OF LEFT KNEE: Status: ACTIVE | Noted: 2023-10-27

## 2025-03-21 PROBLEM — L08.9 LOCAL INFECTION OF WOUND: Status: RESOLVED | Noted: 2025-03-21 | Resolved: 2025-03-21

## 2025-03-21 PROBLEM — E06.3 HASHIMOTO'S DISEASE: Status: ACTIVE | Noted: 2025-03-21

## 2025-03-21 PROBLEM — A87.9 VIRAL MENINGITIS: Status: RESOLVED | Noted: 2024-12-27 | Resolved: 2025-03-21

## 2025-03-21 PROBLEM — R73.9 HYPERGLYCEMIA: Status: ACTIVE | Noted: 2018-05-31

## 2025-03-21 PROBLEM — R45.851 SUICIDAL IDEATIONS: Status: ACTIVE | Noted: 2018-08-08

## 2025-03-21 PROBLEM — M79.603 PAIN IN UPPER LIMB: Status: ACTIVE | Noted: 2025-03-21

## 2025-03-21 PROBLEM — I10 PRIMARY HYPERTENSION: Status: ACTIVE | Noted: 2017-11-17

## 2025-03-21 PROBLEM — M99.08 SOMATIC DYSFUNCTION OF RIB: Status: ACTIVE | Noted: 2020-02-17

## 2025-03-21 PROBLEM — M54.2 NECK PAIN: Status: ACTIVE | Noted: 2018-10-27

## 2025-03-21 PROBLEM — F41.1 GENERALIZED ANXIETY DISORDER: Status: ACTIVE | Noted: 2018-10-29

## 2025-03-21 PROBLEM — M53.3 SACROILIAC JOINT DYSFUNCTION: Status: ACTIVE | Noted: 2025-03-21

## 2025-03-21 PROBLEM — M25.569 KNEE PAIN: Status: ACTIVE | Noted: 2021-01-12

## 2025-03-21 PROBLEM — E87.1 HYPONATREMIA: Status: ACTIVE | Noted: 2019-01-23

## 2025-03-21 PROBLEM — T14.91XA ATTEMPTED SUICIDE (MULTI): Status: ACTIVE | Noted: 2025-03-19

## 2025-03-21 PROBLEM — M17.12 LOCALIZED OSTEOARTHRITIS OF LEFT KNEE: Status: ACTIVE | Noted: 2023-05-19

## 2025-03-21 PROBLEM — M47.812 FACET ARTHROPATHY, CERVICAL: Status: ACTIVE | Noted: 2021-01-14

## 2025-03-21 PROBLEM — G47.00 INSOMNIA: Chronic | Status: ACTIVE | Noted: 2021-06-03

## 2025-03-21 PROBLEM — J32.4 PANSINUSITIS: Chronic | Status: ACTIVE | Noted: 2021-06-03

## 2025-03-21 PROBLEM — Z20.822 SUSPECTED SEVERE ACUTE RESPIRATORY SYNDROME CORONAVIRUS 2 (SARS-COV-2) INFECTION: Status: RESOLVED | Noted: 2025-03-21 | Resolved: 2025-03-21

## 2025-03-21 PROBLEM — J34.89 SINUS PRESSURE: Status: ACTIVE | Noted: 2025-03-21

## 2025-03-21 PROBLEM — J45.909 ASTHMA: Status: RESOLVED | Noted: 2021-07-19 | Resolved: 2025-03-21

## 2025-03-21 PROBLEM — F23 ACUTE PSYCHOSIS (MULTI): Status: ACTIVE | Noted: 2020-06-19

## 2025-03-21 PROBLEM — M79.18 MYOFASCIAL PAIN SYNDROME: Status: ACTIVE | Noted: 2025-03-21

## 2025-03-21 PROBLEM — H04.123 DRY EYES: Status: ACTIVE | Noted: 2025-01-28

## 2025-03-21 LAB
ALBUMIN SERPL BCP-MCNC: 4.1 G/DL (ref 3.4–5)
ANION GAP SERPL CALC-SCNC: 14 MMOL/L (ref 10–20)
BUN SERPL-MCNC: 5 MG/DL (ref 6–23)
CALCIUM SERPL-MCNC: 8.8 MG/DL (ref 8.6–10.3)
CHLORIDE SERPL-SCNC: 98 MMOL/L (ref 98–107)
CO2 SERPL-SCNC: 21 MMOL/L (ref 21–32)
CREAT SERPL-MCNC: 0.69 MG/DL (ref 0.5–1.05)
EGFRCR SERPLBLD CKD-EPI 2021: >90 ML/MIN/1.73M*2
ERYTHROCYTE [DISTWIDTH] IN BLOOD BY AUTOMATED COUNT: 14 % (ref 11.5–14.5)
GLUCOSE BLD MANUAL STRIP-MCNC: 100 MG/DL (ref 74–99)
GLUCOSE BLD MANUAL STRIP-MCNC: 115 MG/DL (ref 74–99)
GLUCOSE SERPL-MCNC: 101 MG/DL (ref 74–99)
HCT VFR BLD AUTO: 35.8 % (ref 36–46)
HGB BLD-MCNC: 12.3 G/DL (ref 12–16)
MAGNESIUM SERPL-MCNC: 1.89 MG/DL (ref 1.6–2.4)
MCH RBC QN AUTO: 31.5 PG (ref 26–34)
MCHC RBC AUTO-ENTMCNC: 34.4 G/DL (ref 32–36)
MCV RBC AUTO: 92 FL (ref 80–100)
NRBC BLD-RTO: 0 /100 WBCS (ref 0–0)
PHOSPHATE SERPL-MCNC: 3 MG/DL (ref 2.5–4.9)
PLATELET # BLD AUTO: 341 X10*3/UL (ref 150–450)
POTASSIUM SERPL-SCNC: 4.2 MMOL/L (ref 3.5–5.3)
RBC # BLD AUTO: 3.91 X10*6/UL (ref 4–5.2)
SODIUM SERPL-SCNC: 129 MMOL/L (ref 136–145)
WBC # BLD AUTO: 6.7 X10*3/UL (ref 4.4–11.3)

## 2025-03-21 PROCEDURE — 85027 COMPLETE CBC AUTOMATED: CPT

## 2025-03-21 PROCEDURE — 94760 N-INVAS EAR/PLS OXIMETRY 1: CPT

## 2025-03-21 PROCEDURE — 1100000001 HC PRIVATE ROOM DAILY

## 2025-03-21 PROCEDURE — 80069 RENAL FUNCTION PANEL: CPT

## 2025-03-21 PROCEDURE — 2500000001 HC RX 250 WO HCPCS SELF ADMINISTERED DRUGS (ALT 637 FOR MEDICARE OP)

## 2025-03-21 PROCEDURE — 99231 SBSQ HOSP IP/OBS SF/LOW 25: CPT | Performed by: PSYCHIATRY & NEUROLOGY

## 2025-03-21 PROCEDURE — 2500000002 HC RX 250 W HCPCS SELF ADMINISTERED DRUGS (ALT 637 FOR MEDICARE OP, ALT 636 FOR OP/ED)

## 2025-03-21 PROCEDURE — 82947 ASSAY GLUCOSE BLOOD QUANT: CPT

## 2025-03-21 PROCEDURE — 83735 ASSAY OF MAGNESIUM: CPT

## 2025-03-21 PROCEDURE — 36415 COLL VENOUS BLD VENIPUNCTURE: CPT

## 2025-03-21 PROCEDURE — 99233 SBSQ HOSP IP/OBS HIGH 50: CPT | Performed by: INTERNAL MEDICINE

## 2025-03-21 PROCEDURE — 94640 AIRWAY INHALATION TREATMENT: CPT

## 2025-03-21 PROCEDURE — 2500000004 HC RX 250 GENERAL PHARMACY W/ HCPCS (ALT 636 FOR OP/ED)

## 2025-03-21 RX ORDER — GUAIFENESIN 600 MG/1
600 TABLET, EXTENDED RELEASE ORAL 2 TIMES DAILY PRN
Status: DISCONTINUED | OUTPATIENT
Start: 2025-03-21 | End: 2025-03-22 | Stop reason: HOSPADM

## 2025-03-21 RX ADMIN — MIRTAZAPINE 15 MG: 15 TABLET, FILM COATED ORAL at 21:24

## 2025-03-21 RX ADMIN — LEVOTHYROXINE SODIUM 150 MCG: 0.07 TABLET ORAL at 05:23

## 2025-03-21 RX ADMIN — GABAPENTIN 600 MG: 300 CAPSULE ORAL at 14:27

## 2025-03-21 RX ADMIN — GABAPENTIN 600 MG: 300 CAPSULE ORAL at 21:24

## 2025-03-21 RX ADMIN — PRAZOSIN HYDROCHLORIDE 1 MG: 1 CAPSULE ORAL at 21:24

## 2025-03-21 RX ADMIN — BUTALBITAL, ACETAMINOPHEN, AND CAFFEINE 1 TABLET: 325; 50; 40 TABLET ORAL at 14:27

## 2025-03-21 RX ADMIN — OXCARBAZEPINE 150 MG: 150 TABLET, FILM COATED ORAL at 08:26

## 2025-03-21 RX ADMIN — LACOSAMIDE 50 MG: 100 TABLET, FILM COATED ORAL at 08:26

## 2025-03-21 RX ADMIN — LISINOPRIL 40 MG: 20 TABLET ORAL at 08:23

## 2025-03-21 RX ADMIN — AMLODIPINE BESYLATE 10 MG: 10 TABLET ORAL at 08:26

## 2025-03-21 RX ADMIN — FENOFIBRATE 160 MG: 160 TABLET ORAL at 08:22

## 2025-03-21 RX ADMIN — DULOXETINE HYDROCHLORIDE 60 MG: 60 CAPSULE, DELAYED RELEASE ORAL at 08:23

## 2025-03-21 RX ADMIN — BUSPIRONE HYDROCHLORIDE 15 MG: 15 TABLET ORAL at 08:26

## 2025-03-21 RX ADMIN — BUDESONIDE 0.5 MG: 0.5 INHALANT RESPIRATORY (INHALATION) at 19:58

## 2025-03-21 RX ADMIN — BUTALBITAL, ACETAMINOPHEN, AND CAFFEINE 1 TABLET: 325; 50; 40 TABLET ORAL at 05:22

## 2025-03-21 RX ADMIN — OXCARBAZEPINE 150 MG: 150 TABLET, FILM COATED ORAL at 21:24

## 2025-03-21 RX ADMIN — DESVENLAFAXINE SUCCINATE 100 MG: 100 TABLET, FILM COATED, EXTENDED RELEASE ORAL at 08:27

## 2025-03-21 RX ADMIN — ENOXAPARIN SODIUM 40 MG: 40 INJECTION SUBCUTANEOUS at 08:22

## 2025-03-21 RX ADMIN — LACOSAMIDE 50 MG: 100 TABLET, FILM COATED ORAL at 21:24

## 2025-03-21 RX ADMIN — FORMOTEROL FUMARATE DIHYDRATE 20 MCG: 20 SOLUTION RESPIRATORY (INHALATION) at 19:58

## 2025-03-21 RX ADMIN — BUSPIRONE HYDROCHLORIDE 15 MG: 15 TABLET ORAL at 21:24

## 2025-03-21 RX ADMIN — NITROFURANTOIN MONOHYDRATE/MACROCRYSTALS 100 MG: 25; 75 CAPSULE ORAL at 08:27

## 2025-03-21 RX ADMIN — THIAMINE HCL TAB 100 MG 100 MG: 100 TAB at 08:26

## 2025-03-21 RX ADMIN — GABAPENTIN 600 MG: 300 CAPSULE ORAL at 08:22

## 2025-03-21 RX ADMIN — LAMOTRIGINE 100 MG: 100 TABLET ORAL at 08:26

## 2025-03-21 RX ADMIN — PANTOPRAZOLE SODIUM 40 MG: 40 TABLET, DELAYED RELEASE ORAL at 08:26

## 2025-03-21 RX ADMIN — Medication: at 21:28

## 2025-03-21 ASSESSMENT — COGNITIVE AND FUNCTIONAL STATUS - GENERAL
DAILY ACTIVITIY SCORE: 24
MOBILITY SCORE: 24
MOBILITY SCORE: 24
DAILY ACTIVITIY SCORE: 24

## 2025-03-21 ASSESSMENT — PAIN - FUNCTIONAL ASSESSMENT
PAIN_FUNCTIONAL_ASSESSMENT: 0-10

## 2025-03-21 ASSESSMENT — PAIN SCALES - GENERAL
PAINLEVEL_OUTOF10: 0 - NO PAIN
PAINLEVEL_OUTOF10: 7
PAINLEVEL_OUTOF10: 2
PAINLEVEL_OUTOF10: 0 - NO PAIN
PAINLEVEL_OUTOF10: 10 - WORST POSSIBLE PAIN
PAINLEVEL_OUTOF10: 0 - NO PAIN
PAINLEVEL_OUTOF10: 5 - MODERATE PAIN

## 2025-03-21 ASSESSMENT — PAIN DESCRIPTION - DESCRIPTORS
DESCRIPTORS: ACHING;HEADACHE
DESCRIPTORS: ACHING;HEADACHE

## 2025-03-21 ASSESSMENT — ACTIVITIES OF DAILY LIVING (ADL): LACK_OF_TRANSPORTATION: NO

## 2025-03-21 NOTE — PROGRESS NOTES
"Ercia Machado is a 59 y.o. year old female patient who is on U admission day 3.      Subjective   Erica Machado is a 59 y.o. year old female patient who was personally seen and interviewed, and discussed with her ICU medical resident. The patient was interviewed alone in her room (interviewed reclined up in bed), and was easily engaged and cooperative. This morning, Erica reports feeling \"excited to go home\" and currently rates her depression at a 5 out of 10. No suicidal ideation or suicidal plans were elicited. She also rates her anxiety at a 5 out of 10, mostly due to a relative who is currently hospitalized and is not improving medically. No auditory hallucinations, visual hallucinations, or paranoia were endorsed or noted.           Objective   Mental Status Exam:   General: Appropriately groomed and dressed in hospital attire, reclined up in ICU bed, awake and alert.   Appearance: Appears stated age.   Attitude: Calm, cooperative.   Behavior: Appropriate eye contact.   Motor Activity: No agitation or retardation. No EPS/TD. Unable to assess gait and station. Normal muscle tone and bulk.   Speech: Regular rate, rhythm, volume and tone, spontaneous, fluent. Non-pressured.   Mood: \"Excited to go home\"   Affect: Pleasant.   Thought Process: Organized, and goal directed.   Thought Content: Does not currently endorse suicidal ideation or any suicide plans.   Does not endorse homicidal ideation.  No overt delusions or paranoia elicited.    Thought Perception: Does not endorse auditory or visual hallucinations, does not appear to be responding to hallucinatory stimuli.   Cognition: Alert, oriented x 3. No deficits noted. Adequate fund of knowledge. No deficit in recent and remote memory. No deficits in attention, concentration or language.   Insight: Fair-to-good, as patient recognizes symptoms of illness and need for recommended treatments.    Judgment: Intact, as patient can make reasonable " decisions about ordinary activities of daily living and necessary medical care recommendations.           LABS:  Results for orders placed or performed during the hospital encounter of 03/18/25 (from the past 96 hours)   Renal Function Panel   Result Value Ref Range    Glucose 98 74 - 99 mg/dL    Sodium 116 (LL) 136 - 145 mmol/L    Potassium 3.1 (L) 3.5 - 5.3 mmol/L    Chloride 84 (L) 98 - 107 mmol/L    Bicarbonate 22 21 - 32 mmol/L    Anion Gap 13 10 - 20 mmol/L    Urea Nitrogen 9 6 - 23 mg/dL    Creatinine 0.63 0.50 - 1.05 mg/dL    eGFR >90 >60 mL/min/1.73m*2    Calcium 8.3 (L) 8.6 - 10.3 mg/dL    Phosphorus 3.3 2.5 - 4.9 mg/dL    Albumin 4.1 3.4 - 5.0 g/dL   Osmolality   Result Value Ref Range    Osmolality, Serum 245 (L) 280 - 300 mOsm/kg   Lactate   Result Value Ref Range    Lactate 0.8 0.4 - 2.0 mmol/L   ECG 12 lead   Result Value Ref Range    Ventricular Rate 68 BPM    Atrial Rate 68 BPM    NE Interval 218 ms    QRS Duration 96 ms    QT Interval 474 ms    QTC Calculation(Bazett) 504 ms    P Axis 68 degrees    R Axis 28 degrees    T Axis 66 degrees    QRS Count 11 beats    Q Onset 224 ms    P Onset 115 ms    P Offset 182 ms    T Offset 461 ms    QTC Fredericia 494 ms   Osmolality, urine   Result Value Ref Range    Osmolality, Urine Random 422 200 - 1,200 mOsm/kg   Urine electrolytes   Result Value Ref Range    Sodium, Urine Random 39 mmol/L    Sodium/Creatinine Ratio 50 Not established. mmol/g Creat    Potassium, Urine Random 36 mmol/L    Potassium/Creatinine Ratio 46 Not established mmol/g Creat    Chloride, Urine Random 46 mmol/L    Chloride/Creatinine Ratio 58 38 - 318 mmol/g creat    Creatinine, Urine Random 78.7 20.0 - 320.0 mg/dL   Albumin-Creatinine Ratio, Urine Random   Result Value Ref Range    Albumin, Urine Random 101.1 Not established mg/L    Creatinine, Urine Random 78.7 20.0 - 320.0 mg/dL    Albumin/Creatinine Ratio 128.5 (H) <30.0 ug/mg Creat   Renal function panel   Result Value Ref Range     Glucose 97 74 - 99 mg/dL    Sodium 114 (LL) 136 - 145 mmol/L    Potassium 3.2 (L) 3.5 - 5.3 mmol/L    Chloride 84 (L) 98 - 107 mmol/L    Bicarbonate 21 21 - 32 mmol/L    Anion Gap 12 10 - 20 mmol/L    Urea Nitrogen 8 6 - 23 mg/dL    Creatinine 0.49 (L) 0.50 - 1.05 mg/dL    eGFR >90 >60 mL/min/1.73m*2    Calcium 8.1 (L) 8.6 - 10.3 mg/dL    Phosphorus 2.9 2.5 - 4.9 mg/dL    Albumin 4.0 3.4 - 5.0 g/dL   Renal function panel   Result Value Ref Range    Glucose 123 (H) 74 - 99 mg/dL    Sodium 112 (LL) 136 - 145 mmol/L    Potassium 2.8 (LL) 3.5 - 5.3 mmol/L    Chloride 84 (L) 98 - 107 mmol/L    Bicarbonate 20 (L) 21 - 32 mmol/L    Anion Gap 11 10 - 20 mmol/L    Urea Nitrogen 9 6 - 23 mg/dL    Creatinine 0.55 0.50 - 1.05 mg/dL    eGFR >90 >60 mL/min/1.73m*2    Calcium 8.0 (L) 8.6 - 10.3 mg/dL    Phosphorus 2.4 (L) 2.5 - 4.9 mg/dL    Albumin 4.1 3.4 - 5.0 g/dL   Renal function panel   Result Value Ref Range    Glucose 91 74 - 99 mg/dL    Sodium 113 (LL) 136 - 145 mmol/L    Potassium 3.9 3.5 - 5.3 mmol/L    Chloride 85 (L) 98 - 107 mmol/L    Bicarbonate 21 21 - 32 mmol/L    Anion Gap 11 10 - 20 mmol/L    Urea Nitrogen 9 6 - 23 mg/dL    Creatinine 0.62 0.50 - 1.05 mg/dL    eGFR >90 >60 mL/min/1.73m*2    Calcium 8.0 (L) 8.6 - 10.3 mg/dL    Phosphorus 2.5 2.5 - 4.9 mg/dL    Albumin 3.9 3.4 - 5.0 g/dL   Renal Function Panel   Result Value Ref Range    Glucose 101 (H) 74 - 99 mg/dL    Sodium 114 (LL) 136 - 145 mmol/L    Potassium 3.7 3.5 - 5.3 mmol/L    Chloride 86 (L) 98 - 107 mmol/L    Bicarbonate 21 21 - 32 mmol/L    Anion Gap 11 10 - 20 mmol/L    Urea Nitrogen 10 6 - 23 mg/dL    Creatinine 0.64 0.50 - 1.05 mg/dL    eGFR >90 >60 mL/min/1.73m*2    Calcium 8.2 (L) 8.6 - 10.3 mg/dL    Phosphorus 2.5 2.5 - 4.9 mg/dL    Albumin 3.9 3.4 - 5.0 g/dL   Cortisol AM   Result Value Ref Range    Cortisol  A.M. 28.4 (H) 5.0 - 20.0 ug/dL   Magnesium   Result Value Ref Range    Magnesium 1.76 1.60 - 2.40 mg/dL   CBC   Result Value Ref Range     WBC 5.7 4.4 - 11.3 x10*3/uL    nRBC 0.0 0.0 - 0.0 /100 WBCs    RBC 3.79 (L) 4.00 - 5.20 x10*6/uL    Hemoglobin 12.0 12.0 - 16.0 g/dL    Hematocrit 33.5 (L) 36.0 - 46.0 %    MCV 88 80 - 100 fL    MCH 31.7 26.0 - 34.0 pg    MCHC 35.8 32.0 - 36.0 g/dL    RDW 13.2 11.5 - 14.5 %    Platelets 250 150 - 450 x10*3/uL   Renal Function Panel   Result Value Ref Range    Glucose 107 (H) 74 - 99 mg/dL    Sodium 116 (LL) 136 - 145 mmol/L    Potassium 3.7 3.5 - 5.3 mmol/L    Chloride 89 (L) 98 - 107 mmol/L    Bicarbonate 20 (L) 21 - 32 mmol/L    Anion Gap 11 10 - 20 mmol/L    Urea Nitrogen 10 6 - 23 mg/dL    Creatinine 0.60 0.50 - 1.05 mg/dL    eGFR >90 >60 mL/min/1.73m*2    Calcium 8.2 (L) 8.6 - 10.3 mg/dL    Phosphorus 2.4 (L) 2.5 - 4.9 mg/dL    Albumin 3.8 3.4 - 5.0 g/dL   Renal function panel   Result Value Ref Range    Glucose 104 (H) 74 - 99 mg/dL    Sodium 115 (LL) 136 - 145 mmol/L    Potassium 3.8 3.5 - 5.3 mmol/L    Chloride 88 (L) 98 - 107 mmol/L    Bicarbonate 21 21 - 32 mmol/L    Anion Gap 10 10 - 20 mmol/L    Urea Nitrogen 9 6 - 23 mg/dL    Creatinine 0.61 0.50 - 1.05 mg/dL    eGFR >90 >60 mL/min/1.73m*2    Calcium 8.5 (L) 8.6 - 10.3 mg/dL    Phosphorus 2.6 2.5 - 4.9 mg/dL    Albumin 4.1 3.4 - 5.0 g/dL   Urinalysis with Reflex Culture and Microscopic   Result Value Ref Range    Color, Urine Yellow Light-Yellow, Yellow, Dark-Yellow    Appearance, Urine Clear Clear    Specific Gravity, Urine 1.019 1.005 - 1.035    pH, Urine 6.5 5.0, 5.5, 6.0, 6.5, 7.0, 7.5, 8.0    Protein, Urine 10 (TRACE) NEGATIVE, 10 (TRACE), 20 (TRACE) mg/dL    Glucose, Urine Normal Normal mg/dL    Blood, Urine NEGATIVE NEGATIVE mg/dL    Ketones, Urine 10 (1+) (A) NEGATIVE mg/dL    Bilirubin, Urine NEGATIVE NEGATIVE mg/dL    Urobilinogen, Urine Normal Normal mg/dL    Nitrite, Urine NEGATIVE NEGATIVE    Leukocyte Esterase, Urine 250 Marianne/uL (A) NEGATIVE   Extra Urine Gray Tube   Result Value Ref Range    Extra Tube Hold for add-ons.    Microscopic  Only, Urine   Result Value Ref Range    WBC, Urine >50 (A) 1-5, NONE /HPF    RBC, Urine 3-5 NONE, 1-2, 3-5 /HPF   Urine Culture    Specimen: Clean Catch/Voided; Urine   Result Value Ref Range    Urine Culture No growth    Renal function panel   Result Value Ref Range    Glucose 159 (H) 74 - 99 mg/dL    Sodium 115 (LL) 136 - 145 mmol/L    Potassium 3.6 3.5 - 5.3 mmol/L    Chloride 88 (L) 98 - 107 mmol/L    Bicarbonate 19 (L) 21 - 32 mmol/L    Anion Gap 12 10 - 20 mmol/L    Urea Nitrogen 8 6 - 23 mg/dL    Creatinine 0.69 0.50 - 1.05 mg/dL    eGFR >90 >60 mL/min/1.73m*2    Calcium 8.0 (L) 8.6 - 10.3 mg/dL    Phosphorus 2.3 (L) 2.5 - 4.9 mg/dL    Albumin 3.9 3.4 - 5.0 g/dL   Renal function panel   Result Value Ref Range    Glucose 103 (H) 74 - 99 mg/dL    Sodium 119 (LL) 136 - 145 mmol/L    Potassium 3.9 3.5 - 5.3 mmol/L    Chloride 91 (L) 98 - 107 mmol/L    Bicarbonate 20 (L) 21 - 32 mmol/L    Anion Gap 12 10 - 20 mmol/L    Urea Nitrogen 7 6 - 23 mg/dL    Creatinine 0.64 0.50 - 1.05 mg/dL    eGFR >90 >60 mL/min/1.73m*2    Calcium 8.1 (L) 8.6 - 10.3 mg/dL    Phosphorus 2.8 2.5 - 4.9 mg/dL    Albumin 3.7 3.4 - 5.0 g/dL   Magnesium   Result Value Ref Range    Magnesium 1.79 1.60 - 2.40 mg/dL   Renal Function Panel   Result Value Ref Range    Glucose 103 (H) 74 - 99 mg/dL    Sodium 120 (LL) 136 - 145 mmol/L    Potassium 4.2 3.5 - 5.3 mmol/L    Chloride 92 (L) 98 - 107 mmol/L    Bicarbonate 20 (L) 21 - 32 mmol/L    Anion Gap 12 10 - 20 mmol/L    Urea Nitrogen 6 6 - 23 mg/dL    Creatinine 0.61 0.50 - 1.05 mg/dL    eGFR >90 >60 mL/min/1.73m*2    Calcium 8.2 (L) 8.6 - 10.3 mg/dL    Phosphorus 2.5 2.5 - 4.9 mg/dL    Albumin 3.8 3.4 - 5.0 g/dL   CBC   Result Value Ref Range    WBC 5.8 4.4 - 11.3 x10*3/uL    nRBC 0.0 0.0 - 0.0 /100 WBCs    RBC 3.78 (L) 4.00 - 5.20 x10*6/uL    Hemoglobin 12.0 12.0 - 16.0 g/dL    Hematocrit 34.1 (L) 36.0 - 46.0 %    MCV 90 80 - 100 fL    MCH 31.7 26.0 - 34.0 pg    MCHC 35.2 32.0 - 36.0 g/dL    RDW  13.4 11.5 - 14.5 %    Platelets 259 150 - 450 x10*3/uL   Renal function panel   Result Value Ref Range    Glucose 98 74 - 99 mg/dL    Sodium 121 (L) 136 - 145 mmol/L    Potassium 4.1 3.5 - 5.3 mmol/L    Chloride 93 (L) 98 - 107 mmol/L    Bicarbonate 22 21 - 32 mmol/L    Anion Gap 10 10 - 20 mmol/L    Urea Nitrogen 5 (L) 6 - 23 mg/dL    Creatinine 0.61 0.50 - 1.05 mg/dL    eGFR >90 >60 mL/min/1.73m*2    Calcium 8.6 8.6 - 10.3 mg/dL    Phosphorus 2.2 (L) 2.5 - 4.9 mg/dL    Albumin 4.0 3.4 - 5.0 g/dL   POCT GLUCOSE   Result Value Ref Range    POCT Glucose 87 74 - 99 mg/dL   Renal function panel   Result Value Ref Range    Glucose 83 74 - 99 mg/dL    Sodium 126 (L) 136 - 145 mmol/L    Potassium 3.9 3.5 - 5.3 mmol/L    Chloride 96 (L) 98 - 107 mmol/L    Bicarbonate 23 21 - 32 mmol/L    Anion Gap 11 10 - 20 mmol/L    Urea Nitrogen 5 (L) 6 - 23 mg/dL    Creatinine 0.60 0.50 - 1.05 mg/dL    eGFR >90 >60 mL/min/1.73m*2    Calcium 8.5 (L) 8.6 - 10.3 mg/dL    Phosphorus 2.2 (L) 2.5 - 4.9 mg/dL    Albumin 4.0 3.4 - 5.0 g/dL   Renal function panel   Result Value Ref Range    Glucose 176 (H) 74 - 99 mg/dL    Sodium 126 (L) 136 - 145 mmol/L    Potassium 3.6 3.5 - 5.3 mmol/L    Chloride 97 (L) 98 - 107 mmol/L    Bicarbonate 18 (L) 21 - 32 mmol/L    Anion Gap 15 10 - 20 mmol/L    Urea Nitrogen 4 (L) 6 - 23 mg/dL    Creatinine 0.78 0.50 - 1.05 mg/dL    eGFR 89 >60 mL/min/1.73m*2    Calcium 8.1 (L) 8.6 - 10.3 mg/dL    Phosphorus 2.0 (L) 2.5 - 4.9 mg/dL    Albumin 3.9 3.4 - 5.0 g/dL   Magnesium   Result Value Ref Range    Magnesium 1.89 1.60 - 2.40 mg/dL   Renal Function Panel   Result Value Ref Range    Glucose 101 (H) 74 - 99 mg/dL    Sodium 129 (L) 136 - 145 mmol/L    Potassium 4.2 3.5 - 5.3 mmol/L    Chloride 98 98 - 107 mmol/L    Bicarbonate 21 21 - 32 mmol/L    Anion Gap 14 10 - 20 mmol/L    Urea Nitrogen 5 (L) 6 - 23 mg/dL    Creatinine 0.69 0.50 - 1.05 mg/dL    eGFR >90 >60 mL/min/1.73m*2    Calcium 8.8 8.6 - 10.3 mg/dL     Phosphorus 3.0 2.5 - 4.9 mg/dL    Albumin 4.1 3.4 - 5.0 g/dL   CBC   Result Value Ref Range    WBC 6.7 4.4 - 11.3 x10*3/uL    nRBC 0.0 0.0 - 0.0 /100 WBCs    RBC 3.91 (L) 4.00 - 5.20 x10*6/uL    Hemoglobin 12.3 12.0 - 16.0 g/dL    Hematocrit 35.8 (L) 36.0 - 46.0 %    MCV 92 80 - 100 fL    MCH 31.5 26.0 - 34.0 pg    MCHC 34.4 32.0 - 36.0 g/dL    RDW 14.0 11.5 - 14.5 %    Platelets 341 150 - 450 x10*3/uL     *Note: Due to a large number of results and/or encounters for the requested time period, some results have not been displayed. A complete set of results can be found in Results Review.        Last Recorded Vitals  Visit Vitals  /83   Pulse 76   Temp 36.7 °C (98.1 °F)   Resp 13        Intake/Output last 3 Shifts:  No intake/output data recorded.    Relevant Results  Scheduled medications  amLODIPine, 10 mg, oral, Daily  budesonide, 0.5 mg, nebulization, BID  busPIRone, 15 mg, oral, BID  desvenlafaxine, 100 mg, oral, Daily  DULoxetine, 60 mg, oral, Daily  enoxaparin, 40 mg, subcutaneous, q24h  fenofibrate, 160 mg, oral, Daily  influenza, 0.5 mL, intramuscular, During hospitalization  formoterol, 20 mcg, nebulization, BID  gabapentin, 600 mg, oral, TID  lacosamide, 50 mg, oral, BID  lamoTRIgine, 100 mg, oral, Daily  levothyroxine, 150 mcg, oral, Daily  lisinopril, 40 mg, oral, Daily  mirtazapine, 15 mg, oral, Nightly  nitrofurantoin (macrocrystal-monohydrate), 100 mg, oral, q12h DWAIN  [Held by provider] Non-Formulary Medication, 1 each, oral, Daily  OXcarbazepine, 150 mg, oral, BID  pantoprazole, 40 mg, oral, Daily before breakfast  prazosin, 1 mg, oral, Nightly  thiamine, 100 mg, oral, Daily      Continuous medications     PRN medications  PRN medications: albuterol, butalbital-acetaminophen-caff, dextrose, dextrose, glucagon, glucagon, meclizine, ondansetron, oral hydration, traZODone, trimethobenzamide               Assessment/Plan   Assessment & Plan  Schizoaffective disorder (Multi)  Plan:    1)  "Discontinued Vilazodone 40 mg Qdaily (due to current hyponatremia) (3-)  2) Changed Trazodone 100 mg at bedtime to PRN (3-)  3) continue other psychiatric medications and AEDs/mood stabilizers as currently adjusted by Neurology Service  4) May discontinue 1-to-1 sitter (3-)  5) Will sign off  MDD (major depressive disorder), recurrent, in full remission ([facility])  Plan: 1) Erica is judged a minimal suicide risk due to: 1) Nephew's guns in the house are locked-up and she has no access to them, 2) Only one prior suicide attempt in 2005 by an overdose attempt, 3) Denies any current suicidal ideation or suicide plans, 4) +plans for the future: \"... I hope to go back to [place]... going back to see my dentist and have my posts redone...,\" 5) No current symptoms of Major Depressive Disorder elicited, and 6) Denies intentional overdose again stating \"I took some of my pills too early.\"        The patient is currently judged safe to be discharged home from a psychiatric viewpoint.       SANDEEP (generalized anxiety disorder)  Plan: 1) see above    Psychogenic nonepileptic seizure  Plan: 1) see above    Alcohol use disorder, severe, in sustained remission (Multi)  Plan: 1) see above    Hyponatremia  Plan: 1) Primary team to follow and manage.      JHONNY Amaya MD  "

## 2025-03-21 NOTE — PROGRESS NOTES
03/21/25 1052   Discharge Planning   Living Arrangements Family members  (sister and nephew)   Support Systems Family members   Assistance Needed Patient is A&O X3, on room air at baseline, does not uses a CPAP/BIPAP at home, is not currently active with any community/home care agencies, is independent with ADLs, does not drive and does not require use of assistive devices for ambulation.   Type of Residence Private residence   Number of Stairs to Enter Residence 4   Number of Stairs Within Residence 12  (to second floor but patient stays on first floor only)   Do you have animals or pets at home? Yes   Type of Animals or Pets dog   Who is requesting discharge planning? Provider   Home or Post Acute Services None   Expected Discharge Disposition Home   Does the patient need discharge transport arranged? No   Financial Resource Strain   How hard is it for you to pay for the very basics like food, housing, medical care, and heating? Not very   Housing Stability   In the last 12 months, was there a time when you were not able to pay the mortgage or rent on time? N   At any time in the past 12 months, were you homeless or living in a shelter (including now)? N   Transportation Needs   In the past 12 months, has lack of transportation kept you from medical appointments or from getting medications? no   In the past 12 months, has lack of transportation kept you from meetings, work, or from getting things needed for daily living? No   Stroke Family Assessment   Stroke Family Assessment Needed No   Intensity of Service   Intensity of Service 0-30 min        Cyclophosphamide Pregnancy And Lactation Text: This medication is Pregnancy Category D and it isn't considered safe during pregnancy. This medication is excreted in breast milk.

## 2025-03-21 NOTE — ASSESSMENT & PLAN NOTE
"Plan: 1) Erica is judged a minimal suicide risk due to: 1) Nephew's guns in the house are locked-up and she has no access to them, 2) Only one prior suicide attempt in 2005 by an overdose attempt, 3) Denies any current suicidal ideation or suicide plans, 4) +plans for the future: \"... I hope to go back to [place]... going back to see my dentist and have my posts redone...,\" 5) No current symptoms of Major Depressive Disorder elicited, and 6) Denies intentional overdose again stating \"I took some of my pills too early.\"        The patient is currently judged safe to be discharged home from a psychiatric viewpoint.       "

## 2025-03-21 NOTE — CARE PLAN
The patient's goals for the shift include  pt will use call light to maintain safety throughout shift.    The clinical goals for the shift include Maintain improvement in Na level by end of shift      Problem: Pain - Adult  Goal: Verbalizes/displays adequate comfort level or baseline comfort level  Outcome: Progressing     Problem: Safety - Adult  Goal: Free from fall injury  Outcome: Progressing     Problem: Discharge Planning  Goal: Discharge to home or other facility with appropriate resources  Outcome: Progressing     Problem: Chronic Conditions and Co-morbidities  Goal: Patient's chronic conditions and co-morbidity symptoms are monitored and maintained or improved  Outcome: Progressing     Problem: Nutrition  Goal: Nutrient intake appropriate for maintaining nutritional needs  Outcome: Progressing     Problem: Fall/Injury  Goal: Be free from injury by end of the shift  Outcome: Progressing  Goal: Verbalize understanding of personal risk factors for fall in the hospital  Outcome: Progressing     Problem: Pain  Goal: Performs ADL's with improved pain control throughout shift  Outcome: Progressing  Goal: Participates in PT with improved pain control throughout the shift  Outcome: Progressing  Goal: Free from opioid side effects throughout the shift  Outcome: Progressing  Goal: Free from acute confusion related to pain meds throughout the shift  Outcome: Progressing

## 2025-03-21 NOTE — SIGNIFICANT EVENT
Erica Machado is a 59 y.o. female presenting for encephalopathy and seizures and admitted to the ICU for hyponatremia. 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 Transferred from [facility] for ICU management of sodium of 108. Sodium improving on fluid restriction s/p 3 infusions of hypertonic saline and isotonic fluid intake.     Patient was seen and examined at bedside. Agree with ICU plan:    Acute medical issues:    #Hyponatremia  :: Most consistent with SIADH  - Free water restrict 1200cc daily, encourage electrolyte drinks  - Check sodium 3/22    Resolved medical issues:  # Encephalopathy - resolved  # Witnessed seizures at home - resolved  :: Suspect PNES  - See  for management of hyponatremia  - Per neuro, recommend tapering oxcarbazepine by halving dose every 2 weeks beginning today  - Continue lacosamide 50mg twice daily  - Continue duloxetine and lamotrigine    # UTI - resolved  :: Culture negative  - Stop nitrofurantoin    # Medication overdose with unknown intent - resolved  :: Low risk of suicide attempt  - Psychiatry consult appreciate recs    Chronic medical issues:  # Migraines  :: On abortive treatment with rimegepant  - Current headache, restart home fioricet q8hr PRN  - Follow up scheduled with neurology (Brina) in 5/2025  - Neurology following for medication optimization, appreciate recommendations    # Schizoaffective disorder  # Bipolar disorder  - Hold home aripiprazole 400mg 28 days  - Continue Brexpiprazole  - Increase home lamotrigine to 100mg daily    # MDD with previous suicide attempt  # SANDEEP  - Continue buspar 30mg twice daily  - Hold vilazodone per psychiatry  - Increase duloxetine to 60mg  daily per neuro  - Continue home mirtazapine  - Stop zolpidem    # Asthma  - Continue home fomoterol 2 times daily  - Continue home budesonide 2 times daily  - Continue PRN albuterol    # HLD: Continue home fenfofibrate  # HTN: Continue home amlodipine 10 and lisinopril 40  # GERD: Continue home pantoprazole 40  # Hypothyroidism: Continue home levothyroxine  # Insomnia: Change trazodone from scheduled to PRN    F PO electrolyte solution  E Replete PRN, goal Na 126-128 by 2100 on 3/18  N Regular carb controlled fluid restrict  G None  DVT: Enoxaparin  Abx: Nitrofurantoin 100mg twice daily stopped (3/18-3/21)  Lines: PIV 20g antecubital   Drips: None  Pressors: None  Code status: Full Code    Dispo: ICU Transfer for management of hyponatremia.    Joanna Roberson MD PGY-1

## 2025-03-21 NOTE — ASSESSMENT & PLAN NOTE
Plan:    1) Discontinued Vilazodone 40 mg Qdaily (due to current hyponatremia) (3-)  2) Changed Trazodone 100 mg at bedtime to PRN (3-)  3) continue other psychiatric medications and AEDs/mood stabilizers as currently adjusted by Neurology Service  4) May discontinue 1-to-1 sitter (3-)  5) Will sign off

## 2025-03-21 NOTE — PROGRESS NOTES
Subjective   Erica Machado is a 59 y.o. female presenting for hyponatremia. Overnight the patient reported 4 loose bowel movements which improved with milk of magnesia. Additionally, she had headaches which were sufficiently controlled with fioricet. She was frustrated with her long recovery and wished to go home.    Objective   Physical Exam  Constitutional:       General: She is not in acute distress.     Appearance: She is obese. She is not ill-appearing.   HENT:      Head: Normocephalic and atraumatic.      Nose: Nose normal.      Mouth/Throat:      Mouth: Mucous membranes are moist.      Pharynx: Oropharynx is clear.   Eyes:      Extraocular Movements: Extraocular movements intact.      Pupils: Pupils are equal, round, and reactive to light.   Cardiovascular:      Rate and Rhythm: Normal rate and regular rhythm.      Pulses: Normal pulses.      Heart sounds: Normal heart sounds.   Pulmonary:      Effort: Pulmonary effort is normal.      Breath sounds: Normal breath sounds.   Abdominal:      General: Abdomen is flat. Bowel sounds are normal.      Palpations: Abdomen is soft.   Musculoskeletal:         General: Normal range of motion.      Right lower leg: No edema.      Left lower leg: No edema.   Skin:     General: Skin is warm and dry.      Capillary Refill: Capillary refill takes less than 2 seconds.   Neurological:      General: No focal deficit present.      Mental Status: She is alert and oriented to person, place, and time. Mental status is at baseline.      Cranial Nerves: No cranial nerve deficit.      Sensory: No sensory deficit.      Motor: No weakness.      Gait: Gait normal.     Temp:  [36.1 °C (97 °F)-37 °C (98.6 °F)] 36.7 °C (98.1 °F)  Heart Rate:  [68-95] 88  Resp:  [12-23] 15  BP: (106-150)/(57-89) 150/83  Vitals:    03/21/25 0400   Weight: 90.6 kg (199 lb 11.8 oz)       I/Os    Intake/Output Summary (Last 24 hours) at 3/21/2025 1235  Last data filed at 3/21/2025 0900  Gross per 24 hour    Intake 1699 ml   Output 4100 ml   Net -2401 ml     Labs:   CBC:   Recent Labs     03/21/25  0530 03/20/25  0541 03/19/25  0419   WBC 6.7 5.8 5.7   HGB 12.3 12.0 12.0   HCT 35.8* 34.1* 33.5*    259 250   MCV 92 90 88     CMP:   Recent Labs     03/21/25  0530 03/20/25 2008 03/20/25  1222   * 126* 126*   K 4.2 3.6 3.9   CL 98 97* 96*   CO2 21 18* 23   ANIONGAP 14 15 11   BUN 5* 4* 5*   CREATININE 0.69 0.78 0.60   EGFR >90 89 >90   GLUCOSE 101* 176* 83     Recent Labs     03/21/25  0530 03/20/25 2008 03/20/25  1222 03/17/25  1634 09/27/24 2013   ALBUMIN 4.1 3.9 4.0 4.9 5.1*   ALKPHOS  --   --   --  104 71   ALT  --   --   --  15 18   AST  --   --   --  16 18   BILITOT  --   --   --  0.5 0.4   LIPASE  --   --   --   --   --     < > = values in this interval not displayed.     Calcium/Phos:   Lab Results   Component Value Date    CALCIUM 8.8 03/21/2025    PHOS 3.0 03/21/2025      COAG:   Recent Labs     03/17/25  1634 09/19/24  1606   INR 1.0 1.0     ENDO:   Recent Labs     03/17/25 2050 04/17/24  1117 04/11/24  0524 02/07/24  1117   TSH 25.97* 54.60* 117.00*  --    HGBA1C  --   --   --  5.5      CARDIAC:   Recent Labs     09/19/24  0933 06/15/24  1138 04/10/24  1714   TROPHS <3 6 8     Recent Labs     11/05/22  0607 05/25/22  0717 01/03/22  1052   CHOL 162 386* 249*   LDLF 90 - -   HDL 45.8 48.4 51.7   TRIG 130 502* 423*     URINE:   Recent Labs     03/18/25  1051 03/18/25  0830 03/17/25  1726 03/17/25  1649 09/22/24  1056 09/22/24  0521   OSMOLALITY  --  245* 232*  --   --  265*   OSMURSPOT 422  --   --  390 502  --    SODIUMURR 39  --   --  57 128  --    NACREATUR 50  --   --  98 273  --       Micro/ID:   Lab Results   Component Value Date    URINECULTURE No growth 03/19/2025    BLOODCULT  09/18/2023     No Growth at 1 days~No Growth at 2 days~No Growth at 3 days~NO GROWTH at 4 days - FINAL REPORT     Imaging:  XR chest 1 view  Result Date: 3/17/2025  No acute cardiopulmonary  process.    Meds:  Scheduled medications  amLODIPine, 10 mg, oral, Daily  budesonide, 0.5 mg, nebulization, BID  busPIRone, 15 mg, oral, BID  desvenlafaxine, 100 mg, oral, Daily  DULoxetine, 60 mg, oral, Daily  enoxaparin, 40 mg, subcutaneous, q24h  fenofibrate, 160 mg, oral, Daily  influenza, 0.5 mL, intramuscular, During hospitalization  formoterol, 20 mcg, nebulization, BID  gabapentin, 600 mg, oral, TID  lacosamide, 50 mg, oral, BID  lamoTRIgine, 100 mg, oral, Daily  levothyroxine, 150 mcg, oral, Daily  lisinopril, 40 mg, oral, Daily  mirtazapine, 15 mg, oral, Nightly  [Held by provider] Non-Formulary Medication, 1 each, oral, Daily  OXcarbazepine, 150 mg, oral, BID  pantoprazole, 40 mg, oral, Daily before breakfast  prazosin, 1 mg, oral, Nightly  thiamine, 100 mg, oral, Daily    PRN medications  PRN medications: albuterol, butalbital-acetaminophen-caff, dextrose, dextrose, glucagon, glucagon, guaiFENesin, meclizine, ondansetron, oral hydration, traZODone, trimethobenzamide     Assessment   Erica Machado is a 59 y.o. female presenting for encephalopathy and seizures and admitted to the ICU for hyponatremia. 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 Transferred from [place] for ICU management of sodium of 108. Sodium improving on fluid restriction s/p 3 infusions of hypertonic saline and isotonic fluid intake.     Neuro  # Encephalopathy - resolved  # Witnessed seizures at home - resolved  :: Suspect PNES  - See  for management of hyponatremia  - Per neuro, recommend tapering oxcarbazepine by halving dose every 2 weeks beginning today  - Continue lacosamide 50mg twice daily  - Continue duloxetine and lamotrigine    #  Migraines  :: On abortive treatment with rimegepant  - Current headache, restart home fioricet q8hr PRN  - Follow up scheduled with neurology (Brina) in 5/2025  - Neurology following for medication optimization, appreciate recommendations     Pulm  # Asthma  - Continue home fomoterol 2 times daily  - Continue home budesonide 2 times daily  - Continue PRN albuterol     Cardiovascular  # HLD  - Continue home fenfofibrate    # HTN  - Continue home amlodipine 10 and lisinopril 40     GI  # GERD  - Continue home pantoprazole 40     Renal/  # Hyponatremia - improving  :: Most consistent with SIADH  - Free water restrict 1200cc daily, encourage electrolyte drinks  - Check sodium 3/22     # UTI - resolved  :: Culture negative  - Stop nitrofurantoin     Endo  # Hypothyroidism  - Continue home levothyroxine     ID  No acute issues     Heme  No acute issues     MSK/Rheum  No acute issues     Derm  No acute issues     Psych  # Schizoaffective disorder  # Bipolar disorder  - Hold home aripiprazole 400mg 28 days  - Continue Brexpiprazole  - Increase home lamotrigine to 100mg daily     # MDD with previous suicide attempt  # SANDEEP  - Continue buspar 30mg twice daily  - Hold vilazodone per psychiatry  - Increase duloxetine to 60mg daily per neuro  - Continue home mirtazapine  - Stop zolpidem     # Medication overdose with unknown intent - resolved  :: Low risk of suicide attempt  - Psychiatry consult appreciate recs     # Insomnia  - Change trazodone from scheduled to PRN     F PO electrolyte solution  E Replete PRN, goal Na 126-128 by 2100 on 3/18  N Regular carb controlled fluid restrict  G None  DVT: Enoxaparin  Abx: Nitrofurantoin 100mg twice daily stopped (3/18-3/21)  Lines: PIV 20g antecubital   Drips: None  Pressors: None  Code status: Full Code  NoK: Morgan Pratt, Home Phone: [phone]     Dispo: Transfer to ICU from [place] for hyponatremia. Stable for transfer to floors. Lives at home with sister no needs per  [facility].    Beto Prince MD MS  PGY2 Internal Medicine

## 2025-03-21 NOTE — CARE PLAN
The patient's goals for the shift include      The clinical goals for the shift include Maintain improvement in Na level by end of shift

## 2025-03-21 NOTE — CARE PLAN
The patient's goals for the shift include      The clinical goals for the shift include Maintain improvement in Na level by end of shift    Over the shift, the patient did not make progress toward the following goals. Barriers to progression include   Problem: Pain - Adult  Goal: Verbalizes/displays adequate comfort level or baseline comfort level  Outcome: Progressing     Problem: Safety - Adult  Goal: Free from fall injury  Outcome: Progressing   . Recommendations to address these barriers include .

## 2025-03-22 VITALS
BODY MASS INDEX: 29.58 KG/M2 | SYSTOLIC BLOOD PRESSURE: 149 MMHG | HEART RATE: 72 BPM | HEIGHT: 69 IN | TEMPERATURE: 96.8 F | OXYGEN SATURATION: 93 % | RESPIRATION RATE: 18 BRPM | WEIGHT: 199.74 LBS | DIASTOLIC BLOOD PRESSURE: 94 MMHG

## 2025-03-22 PROBLEM — E87.1 HYPONATREMIA: Status: RESOLVED | Noted: 2019-01-23 | Resolved: 2025-03-22

## 2025-03-22 LAB
ALBUMIN SERPL BCP-MCNC: 4.4 G/DL (ref 3.4–5)
ANION GAP SERPL CALC-SCNC: 14 MMOL/L (ref 10–20)
BUN SERPL-MCNC: 5 MG/DL (ref 6–23)
CALCIUM SERPL-MCNC: 9.4 MG/DL (ref 8.6–10.3)
CHLORIDE SERPL-SCNC: 97 MMOL/L (ref 98–107)
CO2 SERPL-SCNC: 22 MMOL/L (ref 21–32)
CREAT SERPL-MCNC: 0.77 MG/DL (ref 0.5–1.05)
EGFRCR SERPLBLD CKD-EPI 2021: 90 ML/MIN/1.73M*2
ERYTHROCYTE [DISTWIDTH] IN BLOOD BY AUTOMATED COUNT: 14.3 % (ref 11.5–14.5)
GLUCOSE BLD MANUAL STRIP-MCNC: 103 MG/DL (ref 74–99)
GLUCOSE BLD MANUAL STRIP-MCNC: 99 MG/DL (ref 74–99)
GLUCOSE SERPL-MCNC: 100 MG/DL (ref 74–99)
HCT VFR BLD AUTO: 36.8 % (ref 36–46)
HGB BLD-MCNC: 12.5 G/DL (ref 12–16)
MAGNESIUM SERPL-MCNC: 1.81 MG/DL (ref 1.6–2.4)
MCH RBC QN AUTO: 31.8 PG (ref 26–34)
MCHC RBC AUTO-ENTMCNC: 34 G/DL (ref 32–36)
MCV RBC AUTO: 94 FL (ref 80–100)
NRBC BLD-RTO: 0 /100 WBCS (ref 0–0)
PHOSPHATE SERPL-MCNC: 2.9 MG/DL (ref 2.5–4.9)
PLATELET # BLD AUTO: 371 X10*3/UL (ref 150–450)
POTASSIUM SERPL-SCNC: 4.2 MMOL/L (ref 3.5–5.3)
RBC # BLD AUTO: 3.93 X10*6/UL (ref 4–5.2)
SODIUM SERPL-SCNC: 129 MMOL/L (ref 136–145)
WBC # BLD AUTO: 7.7 X10*3/UL (ref 4.4–11.3)

## 2025-03-22 PROCEDURE — 36415 COLL VENOUS BLD VENIPUNCTURE: CPT

## 2025-03-22 PROCEDURE — 2500000002 HC RX 250 W HCPCS SELF ADMINISTERED DRUGS (ALT 637 FOR MEDICARE OP, ALT 636 FOR OP/ED)

## 2025-03-22 PROCEDURE — 99239 HOSP IP/OBS DSCHRG MGMT >30: CPT

## 2025-03-22 PROCEDURE — 2500000004 HC RX 250 GENERAL PHARMACY W/ HCPCS (ALT 636 FOR OP/ED)

## 2025-03-22 PROCEDURE — 94760 N-INVAS EAR/PLS OXIMETRY 1: CPT

## 2025-03-22 PROCEDURE — 83735 ASSAY OF MAGNESIUM: CPT

## 2025-03-22 PROCEDURE — 80069 RENAL FUNCTION PANEL: CPT

## 2025-03-22 PROCEDURE — 2500000001 HC RX 250 WO HCPCS SELF ADMINISTERED DRUGS (ALT 637 FOR MEDICARE OP)

## 2025-03-22 PROCEDURE — 94640 AIRWAY INHALATION TREATMENT: CPT

## 2025-03-22 PROCEDURE — 82947 ASSAY GLUCOSE BLOOD QUANT: CPT

## 2025-03-22 PROCEDURE — 85027 COMPLETE CBC AUTOMATED: CPT

## 2025-03-22 RX ORDER — LACOSAMIDE 50 MG/1
50 TABLET ORAL 2 TIMES DAILY
Qty: 60 TABLET | Refills: 0 | Status: SHIPPED | OUTPATIENT
Start: 2025-03-22

## 2025-03-22 RX ORDER — OXCARBAZEPINE 150 MG/1
TABLET, FILM COATED ORAL
Qty: 38 TABLET | Refills: 0 | Status: SHIPPED | OUTPATIENT
Start: 2025-03-22 | End: 2025-04-17

## 2025-03-22 RX ADMIN — ENOXAPARIN SODIUM 40 MG: 40 INJECTION SUBCUTANEOUS at 10:42

## 2025-03-22 RX ADMIN — LACOSAMIDE 50 MG: 100 TABLET, FILM COATED ORAL at 10:43

## 2025-03-22 RX ADMIN — BUSPIRONE HYDROCHLORIDE 15 MG: 15 TABLET ORAL at 10:44

## 2025-03-22 RX ADMIN — DESVENLAFAXINE SUCCINATE 100 MG: 100 TABLET, FILM COATED, EXTENDED RELEASE ORAL at 10:44

## 2025-03-22 RX ADMIN — AMLODIPINE BESYLATE 10 MG: 10 TABLET ORAL at 10:44

## 2025-03-22 RX ADMIN — PANTOPRAZOLE SODIUM 40 MG: 40 TABLET, DELAYED RELEASE ORAL at 05:43

## 2025-03-22 RX ADMIN — FORMOTEROL FUMARATE DIHYDRATE 20 MCG: 20 SOLUTION RESPIRATORY (INHALATION) at 07:03

## 2025-03-22 RX ADMIN — FENOFIBRATE 160 MG: 160 TABLET ORAL at 10:43

## 2025-03-22 RX ADMIN — LAMOTRIGINE 100 MG: 100 TABLET ORAL at 10:43

## 2025-03-22 RX ADMIN — OXCARBAZEPINE 150 MG: 150 TABLET, FILM COATED ORAL at 10:43

## 2025-03-22 RX ADMIN — LEVOTHYROXINE SODIUM 150 MCG: 0.07 TABLET ORAL at 05:43

## 2025-03-22 RX ADMIN — DULOXETINE HYDROCHLORIDE 60 MG: 60 CAPSULE, DELAYED RELEASE ORAL at 10:43

## 2025-03-22 RX ADMIN — BUTALBITAL, ACETAMINOPHEN, AND CAFFEINE 1 TABLET: 325; 50; 40 TABLET ORAL at 04:20

## 2025-03-22 RX ADMIN — THIAMINE HCL TAB 100 MG 100 MG: 100 TAB at 10:43

## 2025-03-22 RX ADMIN — BUDESONIDE 0.5 MG: 0.5 INHALANT RESPIRATORY (INHALATION) at 07:03

## 2025-03-22 RX ADMIN — GABAPENTIN 600 MG: 300 CAPSULE ORAL at 10:42

## 2025-03-22 RX ADMIN — LISINOPRIL 40 MG: 20 TABLET ORAL at 10:43

## 2025-03-22 NOTE — DISCHARGE SUMMARY
Discharge Diagnosis  Hyponatremia    Issues Requiring Follow-Up  Follow up with PCP and neurology in one week for hyponatremia and management of seizure medications. CBC and CMP were ordered for 3/25/25. Recommend tapering oxcarbazepine with oxcarbazepine 150 mg BID for 12 more days (end 4/2/25), and oxcarbazepine 75 mg BID for 14 days (end 4/16/25) due to concern of SIADH. Vilazodone was discontinued due to hyponatremia.  Referral for nephrology was made. Please follow up for evaluation of hyponatremia.  Referral for endocrine was made. Please follow up hypothyroidism and cortisol levels.    Discharge Meds     Medication List      START taking these medications     lacosamide 50 mg tablet; Commonly known as: Vimpat; Take 1 tablet (50   mg) by mouth 2 times a day.     CHANGE how you take these medications     OXcarbazepine 150 mg tablet; Commonly known as: Trileptal; Take 1 tablet   (150 mg) by mouth 2 times a day for 12 days, THEN 0.5 tablets (75 mg) 2   times a day for 14 days.; Start taking on: March 22, 2025; What changed:   medication strength, See the new instructions.     CONTINUE taking these medications     Abilify Maintena 400 mg injection syringe; Generic drug: ARIPiprazole ER   albuterol 90 mcg/actuation inhaler; Inhale two puffs by mouth and into   the lungs every 4 hours as directed   amLODIPine 10 mg tablet; Commonly known as: Norvasc; Take 1 tablet (10   mg) by mouth once daily.   azelastine 137 mcg (0.1 %) nasal spray; Commonly known as: Astelin   busPIRone 15 mg tablet; Commonly known as: Buspar   butalbital-acetaminophen-caff -40 mg tablet; Take 1 tablet by   mouth every 6 hours if needed   DULoxetine 30 mg DR capsule; Commonly known as: Cymbalta; Take 1 Capsule   by mouth once daily   gabapentin 600 mg tablet; Commonly known as: Neurontin; Take 1 tablet   (600 mg) by mouth 3 times a day.   lamoTRIgine 25 mg tablet; Commonly known as: LaMICtal; Take 3 tablets by   mouth once daily.    lisinopril 40 mg tablet; Take 1 tablet (40 mg) by mouth once daily.   loperamide 2 mg capsule; Commonly known as: Imodium; Take 1 Capsule by   mouth 2 (two) times daily as needed for diarrhea   mirtazapine 15 mg tablet; Commonly known as: Remeron   mometasone-formoterol 200-5 mcg/actuation inhaler; Commonly known as:   Dulera 200; Inhale 2 puffs by mouth and into the lungs two times a day.   Rinse mouth after each use   prazosin 1 mg capsule; Commonly known as: Minipress; TAKE 1 CAPSULE BY   MOUTH AT BEDTIME FOR PTSD/NIGHTMARES/FLASHBACKS/HYPERVIGILANCE   Rexulti 0.5 mg tablet; Generic drug: brexpiprazole; Take 1 Tablet by   mouth once daily.   sucralfate 1 gram tablet; Commonly known as: Carafate; Take 1 tablet (1   g) by mouth 2 times a day before meals.   thiamine 100 mg tablet; Commonly known as: Vitamin B-1; Take 1 tablet   (100 mg) by mouth once daily.   traZODone 100 mg tablet; Commonly known as: Desyrel; Take 1 tablet (100   mg) by mouth once daily at bedtime.   zolpidem 10 mg tablet; Commonly known as: Ambien; TAKE 1 TABLET BY MOUTH   AT BEDTIME AS NEEDED FOR SLEEP     STOP taking these medications     clarithromycin 500 mg tablet; Commonly known as: Biaxin   levothyroxine 150 mcg tablet; Commonly known as: Synthroid, Levoxyl   vilazodone 40 mg tablet; Commonly known as: Viibryd       Test Results Pending At Discharge  Pending Labs       No current pending labs.            Hospital Course  Erica Machado is a 59 y.o. female past medical history of bipolar disorder, schizoaffective disorder, asthma, COPD, GERD, MDD, hyperlipidemia, seizure disorder, migraines, suicide attempt with acetaminophen, barbituate and alcohol abuse, and hypothyroidism presented to ED after decrease in consciousness. Patient states that she presented due to seizures which have been gradually worsening over the last several days. ED notes state that the patient presented for decreased alertness after ingestion of unknown  "prescribed drugs in a suicide attempt. Patient does not recall what medications those were or when she took them.      In the ED, she told providers that she had no intent to harm herself and was taking medications to help her sleep because \"her mind will not turn off.\" There, she was oriented to person and place, and initially agitated, pulling IV lines and cardiac monitors. She was soaked in urine and tossing in bed. She was changed lethargic, rousable to noxious stimuli, and did not appear internally stimulated.      Previously in 12/2024, she was admitted to [facility] for 5 days due to headaches and transient encephalopathy. She was initially treated for meningitis, but ID consulted and did not believe the presentation was consistent with meningitis. She was discharged home with a diagnosis of migraine headache. In 6/15/2024 admitted to [facility] for n/v and seizures, found to have sodium of 117 and was treated for medication induced SIADH with fluid restriction. Held vilazidone per psych recs. Gave hypertonic saline on 3/19. Na improved to 129. She remained stable and was transferred to floors on 3/21/25.    On the floors, she remained hemodynamically stable. Na 129. She remained asymptomatic. She was continued on free water restriction 1200 ml and continued on lacosamide 50 mg BID, lamotrigine, and duloxetine. We discussed changes in medications, including tapering off oxcarbazepine and discontinuing vilazodone. She will be discharged with follow up CBC and CMP on 3/25/25 as well as follow up with PCP and neurology. Referrals were made for nephrology for further evaluation of hyponatremia and endocrinology for follow up hypothyroidism.      Pertinent Physical Exam At Time of Discharge  Physical Exam  Constitutional:       General: She is not in acute distress.     Appearance: She is not ill-appearing.   Cardiovascular:      Rate and Rhythm: Normal rate and regular rhythm.   Pulmonary:      Effort: Pulmonary effort is " normal. No respiratory distress.      Breath sounds: No wheezing or rales.   Abdominal:      General: There is no distension.      Tenderness: There is no abdominal tenderness. There is no guarding.   Musculoskeletal:      Right lower leg: No edema.      Left lower leg: No edema.   Skin:     General: Skin is warm and dry.         Outpatient Follow-Up  Future Appointments   Date Time Provider Department Boring   6/3/2025  9:00 AM [facility]   6/17/2025  8:00 AM Destin Pinzon MD [address]         Joanna Roberson MD

## 2025-03-22 NOTE — PROGRESS NOTES
03/22/25 1300   Discharge Planning   Expected Discharge Disposition Home  (Pt aware of and fine with dc today. Patient denies home going needs. Patient has ride when dc complete.)

## 2025-03-24 ENCOUNTER — TELEPHONE (OUTPATIENT)
Dept: PRIMARY CARE | Facility: CLINIC | Age: 60
End: 2025-03-24
Payer: MEDICAID

## 2025-03-24 ENCOUNTER — PHARMACY VISIT (OUTPATIENT)
Dept: PHARMACY | Facility: CLINIC | Age: 60
End: 2025-03-24
Payer: MEDICAID

## 2025-03-24 PROCEDURE — RXMED WILLOW AMBULATORY MEDICATION CHARGE

## 2025-03-24 NOTE — TELEPHONE ENCOUNTER
Patient was admitted to ED 3/17/25  for hypokalemia and hyponatremia per patient Ed discontinued a lot of her medication patient requesting to be started back on clarithromycin  500 mg tab that patient was taking for sore in mouth. Patient tried to make  ED HOSPITAL follow up  with this office but the soonest appt 3/31/25 please advise

## 2025-03-25 ENCOUNTER — PATIENT OUTREACH (OUTPATIENT)
Dept: PRIMARY CARE | Facility: CLINIC | Age: 60
End: 2025-03-25
Payer: MEDICAID

## 2025-03-25 DIAGNOSIS — E87.1 HYPONATREMIA: ICD-10-CM

## 2025-03-25 NOTE — PROGRESS NOTES
Outreach made for post discharge follow up. No contact made. Patient readmitted on 3/25/2025 to [facility].     Discharge Facility: [facility]  Discharge Diagnosis: Hyponatremia  Admission Date: 3/18/2025  Discharge Date: 3/22/2025    Issues requiring follow up:    - Referral for nephrology was made. Please follow up for evaluation of hyponatremia.    - Referral for endocrine was made. Please follow up hypothyroidism and cortisol levels.     PCP Appointment Date: Appointment with Guillermo Okeefe DO (03/31/2025)   Specialist Appointment Date: Nephrology and Endocrinology referrals made,  5/27/2025 1:00 PM Dr. Vinay Gonsalves, Neurology   Hospital Encounter and Summary Linked: Yes  Admission (Discharged) with Seven Cotto MD (03/18/2025)

## 2025-03-25 NOTE — SIGNIFICANT EVENT
Follow Up Phone Call    Outgoing phone call    Spoke to: Erica Machado Relationship:self   Phone number: [phone]      Outcome: contacted patient/ family   No chief complaint on file.         Diagnosis:Not applicable    Currently admitted at [facility] per patient.

## 2025-03-28 ENCOUNTER — PHARMACY VISIT (OUTPATIENT)
Dept: PHARMACY | Facility: CLINIC | Age: 60
End: 2025-03-28
Payer: MEDICAID

## 2025-03-28 PROCEDURE — RXMED WILLOW AMBULATORY MEDICATION CHARGE

## 2025-03-28 RX ORDER — OXCARBAZEPINE 150 MG/1
TABLET, FILM COATED ORAL
Qty: 26 TABLET | Refills: 0 | OUTPATIENT
Start: 2025-03-28

## 2025-03-28 RX ORDER — CHOLECALCIFEROL (VITAMIN D3) 50 MCG
2000 TABLET ORAL DAILY
Qty: 30 TABLET | Refills: 0 | OUTPATIENT
Start: 2025-03-28

## 2025-03-28 RX ORDER — LEVOTHYROXINE SODIUM 125 UG/1
125 TABLET ORAL DAILY
Qty: 30 TABLET | Refills: 0 | OUTPATIENT
Start: 2025-03-28

## 2025-03-28 RX ORDER — ATORVASTATIN CALCIUM 80 MG/1
80 TABLET, FILM COATED ORAL NIGHTLY
Qty: 30 TABLET | Refills: 0 | OUTPATIENT
Start: 2025-03-28

## 2025-03-28 RX ORDER — BREXPIPRAZOLE 0.5 MG/1
0.5 TABLET ORAL DAILY
Qty: 30 TABLET | Refills: 0 | OUTPATIENT
Start: 2025-03-28

## 2025-03-28 RX ORDER — LISINOPRIL 20 MG/1
20 TABLET ORAL DAILY
Qty: 30 TABLET | Refills: 0 | OUTPATIENT
Start: 2025-03-28

## 2025-03-28 RX ORDER — BUSPIRONE HYDROCHLORIDE 15 MG/1
15 TABLET ORAL 2 TIMES DAILY
Qty: 60 TABLET | Refills: 0 | OUTPATIENT
Start: 2025-03-28

## 2025-03-28 RX ORDER — PRAZOSIN HYDROCHLORIDE 1 MG/1
1 CAPSULE ORAL
Qty: 30 CAPSULE | Refills: 0 | OUTPATIENT
Start: 2025-03-28

## 2025-03-28 RX ORDER — MELOXICAM 15 MG/1
TABLET ORAL
Qty: 15 TABLET | Refills: 0 | OUTPATIENT
Start: 2025-03-28

## 2025-03-28 RX ORDER — FLUTICASONE PROPIONATE 50 MCG
1 SPRAY, SUSPENSION (ML) NASAL 2 TIMES DAILY
Qty: 16 G | Refills: 0 | OUTPATIENT
Start: 2025-03-28

## 2025-03-28 RX ORDER — PNV NO.95/FERROUS FUM/FOLIC AC 28MG-0.8MG
100 TABLET ORAL DAILY
Qty: 30 TABLET | Refills: 0 | OUTPATIENT
Start: 2025-03-28

## 2025-03-28 RX ORDER — ALBUTEROL SULFATE 90 UG/1
2 INHALANT RESPIRATORY (INHALATION) EVERY 4 HOURS PRN
Qty: 18 G | Refills: 0 | OUTPATIENT
Start: 2025-03-28

## 2025-03-28 RX ORDER — LAMOTRIGINE 25 MG/1
TABLET ORAL 2 TIMES DAILY
Qty: 60 TABLET | Refills: 0 | OUTPATIENT
Start: 2025-03-28

## 2025-03-28 RX ORDER — VILAZODONE HYDROCHLORIDE 20 MG/1
20 TABLET ORAL
Qty: 30 TABLET | Refills: 0 | OUTPATIENT
Start: 2025-03-28

## 2025-03-28 RX ORDER — MOMETASONE FUROATE AND FORMOTEROL FUMARATE DIHYDRATE 200; 5 UG/1; UG/1
2 AEROSOL RESPIRATORY (INHALATION) 2 TIMES DAILY
Qty: 13 G | Refills: 0 | OUTPATIENT
Start: 2025-03-28

## 2025-03-28 RX ORDER — CLINDAMYCIN HYDROCHLORIDE 300 MG/1
CAPSULE ORAL
Qty: 19 CAPSULE | Refills: 0 | OUTPATIENT
Start: 2025-03-28

## 2025-03-28 RX ORDER — GABAPENTIN 300 MG/1
CAPSULE ORAL
Qty: 90 CAPSULE | Refills: 1 | OUTPATIENT
Start: 2025-03-28

## 2025-03-28 RX ORDER — LANOLIN ALCOHOL/MO/W.PET/CERES
100 CREAM (GRAM) TOPICAL DAILY
Qty: 30 TABLET | Refills: 0 | OUTPATIENT
Start: 2025-03-28

## 2025-03-28 RX ORDER — ARIPIPRAZOLE 400 MG
KIT INTRAMUSCULAR
Qty: 1 EACH | Refills: 0 | OUTPATIENT
Start: 2025-03-28

## 2025-03-28 RX ORDER — TALC
POWDER (GRAM) TOPICAL
Qty: 30 TABLET | Refills: 1 | OUTPATIENT
Start: 2025-03-28

## 2025-03-31 ENCOUNTER — PHARMACY VISIT (OUTPATIENT)
Dept: PHARMACY | Facility: CLINIC | Age: 60
End: 2025-03-31
Payer: MEDICAID

## 2025-03-31 ENCOUNTER — PATIENT OUTREACH (OUTPATIENT)
Dept: PRIMARY CARE | Facility: CLINIC | Age: 60
End: 2025-03-31
Payer: MEDICAID

## 2025-03-31 ENCOUNTER — OFFICE VISIT (OUTPATIENT)
Dept: PRIMARY CARE | Facility: CLINIC | Age: 60
End: 2025-03-31
Payer: MEDICAID

## 2025-03-31 VITALS
DIASTOLIC BLOOD PRESSURE: 70 MMHG | WEIGHT: 201.4 LBS | BODY MASS INDEX: 29.83 KG/M2 | TEMPERATURE: 97.8 F | OXYGEN SATURATION: 97 % | SYSTOLIC BLOOD PRESSURE: 130 MMHG | HEART RATE: 76 BPM | HEIGHT: 69 IN

## 2025-03-31 DIAGNOSIS — E87.1 HYPONATREMIA: Primary | ICD-10-CM

## 2025-03-31 DIAGNOSIS — F41.9 ANXIETY: ICD-10-CM

## 2025-03-31 DIAGNOSIS — G43.009 MIGRAINE WITHOUT AURA AND WITHOUT STATUS MIGRAINOSUS, NOT INTRACTABLE: ICD-10-CM

## 2025-03-31 DIAGNOSIS — J01.00 ACUTE NON-RECURRENT MAXILLARY SINUSITIS: ICD-10-CM

## 2025-03-31 PROCEDURE — 2500000004 HC RX 250 GENERAL PHARMACY W/ HCPCS (ALT 636 FOR OP/ED): Performed by: FAMILY MEDICINE

## 2025-03-31 PROCEDURE — 3060F POS MICROALBUMINURIA REV: CPT | Performed by: FAMILY MEDICINE

## 2025-03-31 PROCEDURE — 3008F BODY MASS INDEX DOCD: CPT | Performed by: FAMILY MEDICINE

## 2025-03-31 PROCEDURE — 3078F DIAST BP <80 MM HG: CPT | Performed by: FAMILY MEDICINE

## 2025-03-31 PROCEDURE — RXMED WILLOW AMBULATORY MEDICATION CHARGE

## 2025-03-31 PROCEDURE — 3075F SYST BP GE 130 - 139MM HG: CPT | Performed by: FAMILY MEDICINE

## 2025-03-31 PROCEDURE — 96372 THER/PROPH/DIAG INJ SC/IM: CPT | Performed by: FAMILY MEDICINE

## 2025-03-31 PROCEDURE — 99495 TRANSJ CARE MGMT MOD F2F 14D: CPT | Performed by: FAMILY MEDICINE

## 2025-03-31 PROCEDURE — 4010F ACE/ARB THERAPY RXD/TAKEN: CPT | Performed by: FAMILY MEDICINE

## 2025-03-31 RX ORDER — KETOROLAC TROMETHAMINE 30 MG/ML
60 INJECTION, SOLUTION INTRAMUSCULAR; INTRAVENOUS ONCE
Status: COMPLETED | OUTPATIENT
Start: 2025-03-31 | End: 2025-03-31

## 2025-03-31 RX ORDER — BREXPIPRAZOLE 0.5 MG/1
0.5 TABLET ORAL DAILY
Qty: 30 TABLET | Refills: 0 | OUTPATIENT
Start: 2025-03-31

## 2025-03-31 RX ORDER — AZITHROMYCIN 500 MG/1
500 TABLET, FILM COATED ORAL DAILY
Qty: 5 TABLET | Refills: 0 | Status: SHIPPED | OUTPATIENT
Start: 2025-03-31 | End: 2025-04-07

## 2025-03-31 RX ORDER — DULOXETIN HYDROCHLORIDE 30 MG/1
30 CAPSULE, DELAYED RELEASE ORAL DAILY
Qty: 30 CAPSULE | Refills: 5 | Status: SHIPPED | OUTPATIENT
Start: 2025-03-31

## 2025-03-31 RX ADMIN — KETOROLAC TROMETHAMINE 60 MG: 60 INJECTION, SOLUTION INTRAMUSCULAR at 16:48

## 2025-03-31 ASSESSMENT — PAIN SCALES - GENERAL: PAINLEVEL_OUTOF10: 0-NO PAIN

## 2025-03-31 ASSESSMENT — ENCOUNTER SYMPTOMS
DIARRHEA: 0
NAUSEA: 0
DIFFICULTY URINATING: 0
LOSS OF SENSATION IN FEET: 0
SINUS PRESSURE: 1
DEPRESSION: 0
ENDOCRINE NEGATIVE: 1
DIZZINESS: 0
RHINORRHEA: 1
SHORTNESS OF BREATH: 0
SINUS PAIN: 1
FEVER: 0
OCCASIONAL FEELINGS OF UNSTEADINESS: 0

## 2025-03-31 ASSESSMENT — PATIENT HEALTH QUESTIONNAIRE - PHQ9
1. LITTLE INTEREST OR PLEASURE IN DOING THINGS: NOT AT ALL
SUM OF ALL RESPONSES TO PHQ9 QUESTIONS 1 AND 2: 0
2. FEELING DOWN, DEPRESSED OR HOPELESS: NOT AT ALL

## 2025-03-31 NOTE — PROGRESS NOTES
Subjective   Patient ID: Erica Machado is a 59 y.o. female who presents for Hospital Follow-up.    HPI   The pt presents to the clinic for an ER follow-up. Past medical hx of HLD, HTN, seasonal allergies, type 2 DM, hypothyroidism, Hashimoto's disease, anxiety, MDD, COPD, asthma, bipolar 1 disorder, schizoaffective disorder, MDD, and insomnia.    -ER Follow-up: Pt first visited ER on 3/17/2025 with concerns of seizures and possible overdose. Had been experiencing worsening seizures prior to ER presentation. Also had taken several pills to help her sleep and suffered from decreased alertness afterwards (but could not identify these pills at the ER). Drug screen positive for barbiturates. Labs noted that pt suffering from hyponatremia. Was treated with hypertonic saline. Later, transferred to ICU for further management. Pt was discharged from ER/ICU on 3/22/2025 and advised to taper off oxcarbazepine medication and discontinue vilazodone medication. Also received referrals to nephrology for hyponatremia and endocrinology for hypothyroidism from ER providers. Then, revisited ER on 3/25/2025 with concerns of suicidal ideation and auditory hallucinations after stopping psych meds. Had psych evaluation at ER. Now, feeling better. Refilled psych meds. Repeat CMP and magnesium screening ordered for evaluation of hyponatremia. Pt received ketorolac injection in clinic to treat migraines.     -Sinusitis: Pt endorses some sinusitis symptoms (congestion, cough, PND, sinus pressure). Prescribed Zithromax medication to treat this condition.    Review of Systems   Constitutional:  Negative for fever.        Also see HPI   HENT:  Positive for congestion, postnasal drip, rhinorrhea, sinus pressure and sinus pain.    Eyes:  Negative for visual disturbance.   Respiratory:  Negative for shortness of breath.    Cardiovascular:  Negative for chest pain.   Gastrointestinal:  Negative for diarrhea and nausea.   Endocrine: Negative.  "   Genitourinary:  Negative for difficulty urinating.   Skin:  Negative for rash.   Neurological:  Negative for dizziness.        No focal deficits   Psychiatric/Behavioral:  Negative for suicidal ideas.    All other systems reviewed and are negative.      Objective   /70   Pulse 76   Temp 36.6 °C (97.8 °F)   Ht 1.753 m (5' 9\")   Wt 91.4 kg (201 lb 6.4 oz)   SpO2 97%   BMI 29.74 kg/m²     Physical Exam  Vitals and nursing note reviewed.   Constitutional:       Appearance: Normal appearance.   HENT:      Head: Normocephalic and atraumatic.      Comments: Maxillary sinus tenderness with palpation     Mouth/Throat:      Mouth: Mucous membranes are moist.      Comments: Cobblestoning noted  Airway patent  Eyes:      Conjunctiva/sclera: Conjunctivae normal.   Cardiovascular:      Rate and Rhythm: Normal rate and regular rhythm.      Heart sounds: Normal heart sounds.   Pulmonary:      Effort: Pulmonary effort is normal.      Breath sounds: Normal breath sounds.   Neurological:      Mental Status: She is oriented to person, place, and time.   Psychiatric:         Mood and Affect: Mood normal.         Behavior: Behavior normal.         Assessment/Plan   Diagnoses and all orders for this visit:  Hyponatremia  -     Comprehensive Metabolic Panel; Future  -     Magnesium; Future  Acute non-recurrent maxillary sinusitis  -     azithromycin (Zithromax) 500 mg tablet; Take 1 tablet (500 mg) by mouth once daily for 5 days.  Migraine without aura and without status migrainosus, not intractable  -     ketorolac (Toradol) injection 60 mg  Anxiety  -     DULoxetine (Cymbalta) 30 mg DR capsule; Take 1 Capsule by mouth once daily  -     brexpiprazole (Rexulti) 0.5 mg tablet; Take 1 tablet by mouth once daily.         Scribe Attestation  By signing my name below, I, Thu Alfredo , Scrisiah   attest that this documentation has been prepared under the direction and in the presence of Guillermo Okeefe DO.    "

## 2025-03-31 NOTE — PROGRESS NOTES
Discharge Facility: [facility]  Discharge Diagnosis: Bipolar 1 Disorder   Admission Date: 3/25/2025  Discharge Date: 3/28/2025    Discharge Facility: [facility]  Discharge Diagnosis: Hyponatremia  Admission Date: 3/18/2025  Discharge Date: 3/22/2025    PCP Appointment Date: Appointment with Guillermo Okeefe DO (03/31/2025)   Specialist Appointment Date: 4/8/2025 4:40 PM Dr. Ted Carcamo, Psychiatry/Neurology   Hospital Encounter and Summary Linked: Yes  Hospital Encounter with Daniel Black MD [facility]  Admission (Discharged) with Seven Cotto MD (03/18/2025)     **Post discharge assessment deferred. Patient has a primary care follow up within 2 business days of discharge.

## 2025-04-01 PROCEDURE — RXMED WILLOW AMBULATORY MEDICATION CHARGE

## 2025-04-02 ENCOUNTER — PHARMACY VISIT (OUTPATIENT)
Dept: PHARMACY | Facility: CLINIC | Age: 60
End: 2025-04-02
Payer: MEDICAID

## 2025-04-02 PROCEDURE — RXMED WILLOW AMBULATORY MEDICATION CHARGE

## 2025-04-03 LAB
ALBUMIN SERPL-MCNC: 4.8 G/DL (ref 3.6–5.1)
ALP SERPL-CCNC: 77 U/L (ref 37–153)
ALT SERPL-CCNC: 11 U/L (ref 6–29)
ANION GAP SERPL CALCULATED.4IONS-SCNC: 10 MMOL/L (CALC) (ref 7–17)
AST SERPL-CCNC: 11 U/L (ref 10–35)
BILIRUB SERPL-MCNC: 0.4 MG/DL (ref 0.2–1.2)
BUN SERPL-MCNC: 9 MG/DL (ref 7–25)
CALCIUM SERPL-MCNC: 9.7 MG/DL (ref 8.6–10.4)
CHLORIDE SERPL-SCNC: 94 MMOL/L (ref 98–110)
CO2 SERPL-SCNC: 22 MMOL/L (ref 20–32)
CREAT SERPL-MCNC: 0.8 MG/DL (ref 0.5–1.03)
EGFRCR SERPLBLD CKD-EPI 2021: 86 ML/MIN/1.73M2
GLUCOSE SERPL-MCNC: 74 MG/DL (ref 65–99)
MAGNESIUM SERPL-MCNC: 1.8 MG/DL (ref 1.5–2.5)
POTASSIUM SERPL-SCNC: 4.3 MMOL/L (ref 3.5–5.3)
PROT SERPL-MCNC: 7 G/DL (ref 6.1–8.1)
SODIUM SERPL-SCNC: 126 MMOL/L (ref 135–146)
T4 FREE SERPL-MCNC: 0.7 NG/DL (ref 0.8–1.8)
TSH SERPL-ACNC: 58.57 MIU/L (ref 0.4–4.5)

## 2025-04-08 DIAGNOSIS — F41.9 ANXIETY: Primary | ICD-10-CM

## 2025-04-08 PROCEDURE — RXMED WILLOW AMBULATORY MEDICATION CHARGE

## 2025-04-09 ENCOUNTER — PHARMACY VISIT (OUTPATIENT)
Dept: PHARMACY | Facility: CLINIC | Age: 60
End: 2025-04-09
Payer: MEDICAID

## 2025-04-09 RX ORDER — TRAZODONE HYDROCHLORIDE 100 MG/1
100 TABLET ORAL NIGHTLY
Qty: 30 TABLET | Refills: 5 | OUTPATIENT
Start: 2025-04-09

## 2025-04-13 RX ORDER — DULOXETIN HYDROCHLORIDE 30 MG/1
30 CAPSULE, DELAYED RELEASE ORAL DAILY
Qty: 30 CAPSULE | Refills: 5 | Status: SHIPPED | OUTPATIENT
Start: 2025-04-13

## 2025-04-18 ENCOUNTER — PHARMACY VISIT (OUTPATIENT)
Dept: PHARMACY | Facility: CLINIC | Age: 60
End: 2025-04-18
Payer: MEDICAID

## 2025-04-18 PROCEDURE — RXMED WILLOW AMBULATORY MEDICATION CHARGE

## 2025-04-18 RX ORDER — LIDOCAINE HYDROCHLORIDE 20 MG/ML
SOLUTION OROPHARYNGEAL
Qty: 100 ML | Refills: 0 | OUTPATIENT
Start: 2025-04-18

## 2025-04-18 RX ORDER — DIPHENHYDRAMINE HYDROCHLORIDE 12.5 MG/5ML
LIQUID ORAL
Qty: 100 ML | Refills: 0 | OUTPATIENT
Start: 2025-04-18

## 2025-04-18 RX ORDER — ALUMINUM HYDROXIDE, MAGNESIUM HYDROXIDE, AND SIMETHICONE 1200; 120; 1200 MG/30ML; MG/30ML; MG/30ML
SUSPENSION ORAL
Qty: 100 ML | Refills: 0 | OUTPATIENT
Start: 2025-04-18

## 2025-04-21 ENCOUNTER — PHARMACY VISIT (OUTPATIENT)
Dept: PHARMACY | Facility: CLINIC | Age: 60
End: 2025-04-21
Payer: MEDICAID

## 2025-04-21 PROCEDURE — RXMED WILLOW AMBULATORY MEDICATION CHARGE

## 2025-04-21 RX ORDER — LOPERAMIDE HYDROCHLORIDE 2 MG/1
CAPSULE ORAL
Qty: 20 CAPSULE | Refills: 1 | Status: CANCELLED | OUTPATIENT
Start: 2025-04-21

## 2025-04-21 RX ORDER — BUTALBITAL, ACETAMINOPHEN AND CAFFEINE 50; 325; 40 MG/1; MG/1; MG/1
TABLET ORAL
Qty: 24 TABLET | Refills: 5 | Status: CANCELLED | OUTPATIENT
Start: 2025-04-21

## 2025-04-22 ENCOUNTER — OFFICE VISIT (OUTPATIENT)
Dept: PRIMARY CARE | Facility: CLINIC | Age: 60
End: 2025-04-22
Payer: MEDICAID

## 2025-04-22 VITALS
BODY MASS INDEX: 30.13 KG/M2 | OXYGEN SATURATION: 97 % | TEMPERATURE: 98.2 F | SYSTOLIC BLOOD PRESSURE: 120 MMHG | WEIGHT: 203.4 LBS | HEART RATE: 92 BPM | DIASTOLIC BLOOD PRESSURE: 72 MMHG | HEIGHT: 69 IN

## 2025-04-22 DIAGNOSIS — E87.1 HYPONATREMIA: ICD-10-CM

## 2025-04-22 DIAGNOSIS — E03.9 ACQUIRED HYPOTHYROIDISM: Primary | ICD-10-CM

## 2025-04-22 DIAGNOSIS — Z79.899 MEDICATION MANAGEMENT: ICD-10-CM

## 2025-04-22 PROCEDURE — 3078F DIAST BP <80 MM HG: CPT | Performed by: FAMILY MEDICINE

## 2025-04-22 PROCEDURE — 99214 OFFICE O/P EST MOD 30 MIN: CPT | Performed by: FAMILY MEDICINE

## 2025-04-22 PROCEDURE — 4010F ACE/ARB THERAPY RXD/TAKEN: CPT | Performed by: FAMILY MEDICINE

## 2025-04-22 PROCEDURE — RXMED WILLOW AMBULATORY MEDICATION CHARGE

## 2025-04-22 PROCEDURE — 3060F POS MICROALBUMINURIA REV: CPT | Performed by: FAMILY MEDICINE

## 2025-04-22 PROCEDURE — 3074F SYST BP LT 130 MM HG: CPT | Performed by: FAMILY MEDICINE

## 2025-04-22 PROCEDURE — 3008F BODY MASS INDEX DOCD: CPT | Performed by: FAMILY MEDICINE

## 2025-04-22 RX ORDER — LOPERAMIDE HYDROCHLORIDE 2 MG/1
CAPSULE ORAL
Qty: 20 CAPSULE | Refills: 1 | Status: SHIPPED | OUTPATIENT
Start: 2025-04-22

## 2025-04-22 RX ORDER — BUTALBITAL, ACETAMINOPHEN AND CAFFEINE 50; 325; 40 MG/1; MG/1; MG/1
TABLET ORAL
Qty: 24 TABLET | Refills: 5 | Status: CANCELLED | OUTPATIENT
Start: 2025-04-22

## 2025-04-22 RX ORDER — MELOXICAM 15 MG/1
7.5 TABLET ORAL DAILY PRN
Qty: 30 TABLET | Refills: 3 | Status: SHIPPED | OUTPATIENT
Start: 2025-04-22

## 2025-04-22 RX ORDER — CHOLECALCIFEROL (VITAMIN D3) 50 MCG
50 TABLET ORAL DAILY
Qty: 30 TABLET | Refills: 3 | Status: SHIPPED | OUTPATIENT
Start: 2025-04-22

## 2025-04-22 RX ORDER — ONDANSETRON 4 MG/1
4 TABLET, FILM COATED ORAL EVERY 8 HOURS PRN
Qty: 30 TABLET | Refills: 2 | Status: SHIPPED | OUTPATIENT
Start: 2025-04-22

## 2025-04-22 RX ORDER — LEVOTHYROXINE SODIUM 125 UG/1
125 TABLET ORAL DAILY
Qty: 30 TABLET | Refills: 11 | Status: SHIPPED | OUTPATIENT
Start: 2025-04-22

## 2025-04-22 RX ORDER — ONDANSETRON 4 MG/1
4 TABLET, FILM COATED ORAL EVERY 8 HOURS PRN
COMMUNITY
End: 2025-04-22 | Stop reason: SDUPTHER

## 2025-04-22 ASSESSMENT — ENCOUNTER SYMPTOMS
SHORTNESS OF BREATH: 0
NAUSEA: 0
DIARRHEA: 0
ENDOCRINE NEGATIVE: 1
DIFFICULTY URINATING: 0
FEVER: 0
DIZZINESS: 0

## 2025-04-22 ASSESSMENT — PATIENT HEALTH QUESTIONNAIRE - PHQ9
SUM OF ALL RESPONSES TO PHQ9 QUESTIONS 1 AND 2: 0
2. FEELING DOWN, DEPRESSED OR HOPELESS: NOT AT ALL
1. LITTLE INTEREST OR PLEASURE IN DOING THINGS: NOT AT ALL

## 2025-04-22 ASSESSMENT — PAIN SCALES - GENERAL: PAINLEVEL_OUTOF10: 10-WORST PAIN EVER

## 2025-04-22 NOTE — PROGRESS NOTES
"Subjective   Patient ID: Erica Machado is a 59 y.o. female who presents for Hospital Follow-up ( Txptfs-pelmpswq-htaipark sodium).    HPI   The pt presents to the clinic for an ER follow-up. Past medical hx of HLD, HTN, seasonal allergies, type 2 DM, hypothyroidism, Hashimoto's disease, anxiety, MDD, COPD, asthma, bipolar 1 disorder, schizoaffective disorder, MDD, and insomnia.     -Hyponatremia: Pt continues to suffer from hyponatremia. Her sodium was below normal range at 126 when checked on 4/2/2025. Has been attempting to increase sodium in her diet. Will continue to monitor this condition. Repeat CMP ordered for pt/ Will be re-evaluated based on her next CMP results.    -Hypothyroidism: Pt continues to suffer from hypothyroidism. Notably, her TSH was elevated at 58.57 when checked on 4/2/2025. Has been taking levothyroxine 112 mcg daily (restarted on this medication after recent ER visit). Doing well on this dose of medication. No side-effects reported. Pt received a dose increase for her levothyroxine prescription (from 112 mcg daily to 125 mcg daily) to better treat this condition. She will be re-evaluated based on her next TSH screening.    -Health maintenance: Doing well on current meds. No side-effects reported. Refilled current meds.    Review of Systems   Constitutional:  Negative for fever.        Also see HPI   Eyes:  Negative for visual disturbance.   Respiratory:  Negative for shortness of breath.    Cardiovascular:  Negative for chest pain.   Gastrointestinal:  Negative for diarrhea and nausea.   Endocrine: Negative.    Genitourinary:  Negative for difficulty urinating.   Skin:  Negative for rash.   Neurological:  Negative for dizziness.        No focal deficits   Psychiatric/Behavioral:  Negative for suicidal ideas.    All other systems reviewed and are negative.      Objective   /72   Pulse 92   Temp 36.8 °C (98.2 °F)   Ht 1.753 m (5' 9\")   Wt 92.3 kg (203 lb 6.4 oz)   SpO2 " 97%   BMI 30.04 kg/m²     Physical Exam  Vitals and nursing note reviewed.   Constitutional:       Appearance: Normal appearance.   HENT:      Head: Normocephalic and atraumatic.   Eyes:      Conjunctiva/sclera: Conjunctivae normal.   Cardiovascular:      Rate and Rhythm: Normal rate and regular rhythm.      Heart sounds: Normal heart sounds.   Pulmonary:      Effort: Pulmonary effort is normal.      Breath sounds: Normal breath sounds.   Neurological:      Mental Status: She is oriented to person, place, and time.   Psychiatric:         Mood and Affect: Mood normal.         Behavior: Behavior normal.         Assessment/Plan   Diagnoses and all orders for this visit:  Acquired hypothyroidism  -     levothyroxine (Synthroid, Levoxyl) 125 mcg tablet; Take 1 tablet by mouth daily at 6 am.  -     TSH with reflex to Free T4 if abnormal; Future  Medication management  -     meloxicam (Mobic) 15 mg tablet; Take 0.5 tablet(7.5 mg) by mouth once daily as needed (pain). Take with food  -     loperamide (Imodium) 2 mg capsule; Take 1 Capsule by mouth 2 (two) times daily as needed for diarrhea  -     cholecalciferol (Vitamin D-3) 50 mcg (2,000 units) tablet; Take 1 tablet by mouth once daily.  -     ondansetron (Zofran) 4 mg tablet; Take 1 tablet (4 mg) by mouth every 8 hours if needed for nausea or vomiting.  Hyponatremia  -     CBC and Auto Differential; Future  -     Comprehensive Metabolic Panel; Future         Scribe Attestation  By signing my name below, IThu , Scrisiah   attest that this documentation has been prepared under the direction and in the presence of Guillermo Okeefe DO.

## 2025-04-23 ENCOUNTER — PHARMACY VISIT (OUTPATIENT)
Dept: PHARMACY | Facility: CLINIC | Age: 60
End: 2025-04-23
Payer: MEDICAID

## 2025-04-23 PROCEDURE — RXMED WILLOW AMBULATORY MEDICATION CHARGE

## 2025-04-24 LAB
ALBUMIN SERPL-MCNC: 4.9 G/DL (ref 3.6–5.1)
ALP SERPL-CCNC: 106 U/L (ref 37–153)
ALT SERPL-CCNC: 15 U/L (ref 6–29)
ANION GAP SERPL CALCULATED.4IONS-SCNC: 13 MMOL/L (CALC) (ref 7–17)
AST SERPL-CCNC: 11 U/L (ref 10–35)
BASOPHILS # BLD AUTO: 78 CELLS/UL (ref 0–200)
BASOPHILS NFR BLD AUTO: 0.8 %
BILIRUB SERPL-MCNC: 0.5 MG/DL (ref 0.2–1.2)
BUN SERPL-MCNC: 12 MG/DL (ref 7–25)
CALCIUM SERPL-MCNC: 9.8 MG/DL (ref 8.6–10.4)
CHLORIDE SERPL-SCNC: 98 MMOL/L (ref 98–110)
CO2 SERPL-SCNC: 19 MMOL/L (ref 20–32)
CREAT SERPL-MCNC: 0.82 MG/DL (ref 0.5–1.03)
EGFRCR SERPLBLD CKD-EPI 2021: 83 ML/MIN/1.73M2
EOSINOPHIL # BLD AUTO: 245 CELLS/UL (ref 15–500)
EOSINOPHIL NFR BLD AUTO: 2.5 %
ERYTHROCYTE [DISTWIDTH] IN BLOOD BY AUTOMATED COUNT: 14.2 % (ref 11–15)
GLUCOSE SERPL-MCNC: 156 MG/DL (ref 65–99)
HCT VFR BLD AUTO: 42.5 % (ref 35–45)
HGB BLD-MCNC: 14 G/DL (ref 11.7–15.5)
LYMPHOCYTES # BLD AUTO: 1862 CELLS/UL (ref 850–3900)
LYMPHOCYTES NFR BLD AUTO: 19 %
MCH RBC QN AUTO: 31.2 PG (ref 27–33)
MCHC RBC AUTO-ENTMCNC: 32.9 G/DL (ref 32–36)
MCV RBC AUTO: 94.7 FL (ref 80–100)
MONOCYTES # BLD AUTO: 529 CELLS/UL (ref 200–950)
MONOCYTES NFR BLD AUTO: 5.4 %
NEUTROPHILS # BLD AUTO: 7085 CELLS/UL (ref 1500–7800)
NEUTROPHILS NFR BLD AUTO: 72.3 %
PLATELET # BLD AUTO: 386 THOUSAND/UL (ref 140–400)
PMV BLD REES-ECKER: 10.2 FL (ref 7.5–12.5)
POTASSIUM SERPL-SCNC: 4.3 MMOL/L (ref 3.5–5.3)
PROT SERPL-MCNC: 7.7 G/DL (ref 6.1–8.1)
RBC # BLD AUTO: 4.49 MILLION/UL (ref 3.8–5.1)
SODIUM SERPL-SCNC: 130 MMOL/L (ref 135–146)
WBC # BLD AUTO: 9.8 THOUSAND/UL (ref 3.8–10.8)

## 2025-04-25 ENCOUNTER — PATIENT OUTREACH (OUTPATIENT)
Dept: PRIMARY CARE | Facility: CLINIC | Age: 60
End: 2025-04-25
Payer: MEDICAID

## 2025-04-25 NOTE — PROGRESS NOTES
Confirmation of at least 2 patient identifiers.    Completed telephonic follow-up with patient after recent visit with Dr. Okeefe.   Spoke to patient during outreach call.    Patient reports feeling: Worse. Patient stated she has a migraine headache and is requesting Rx for Fioricet.     Patient has questions or concerns about medications: Yes - Message sent to Dr. Okeefe with Rx request for Fioricet.     Have all prescribed medications been filled? Yes    Patient has necessary resources to manage their care? Yes    Patient has questions or concerns? Yes - Concerned about sodium level. Advised based on most recent labs, sodium trending upward, but not within normal limits yet.     Next care management follow-up approximately within one month.  Care  information provided to patient.

## 2025-04-28 RX ORDER — BUTALBITAL, ACETAMINOPHEN AND CAFFEINE 50; 325; 40 MG/1; MG/1; MG/1
TABLET ORAL
Qty: 24 TABLET | Refills: 5 | Status: CANCELLED | OUTPATIENT
Start: 2025-04-28

## 2025-04-28 NOTE — TELEPHONE ENCOUNTER
LOV: 4/22/25          NEXT OV: none scheduled                            LAST FILL:

## 2025-04-29 NOTE — TELEPHONE ENCOUNTER
Dr Okeefe would like the patient to call her neurologist and discuss options for medications she can take for headache/migraines. Patient was notified today and will call her neurologist.

## 2025-04-30 RX ORDER — BUTALBITAL, ACETAMINOPHEN AND CAFFEINE 50; 325; 40 MG/1; MG/1; MG/1
TABLET ORAL
Qty: 24 TABLET | Refills: 5 | OUTPATIENT
Start: 2025-04-30

## 2025-05-01 PROCEDURE — RXMED WILLOW AMBULATORY MEDICATION CHARGE

## 2025-05-02 ENCOUNTER — PHARMACY VISIT (OUTPATIENT)
Dept: PHARMACY | Facility: CLINIC | Age: 60
End: 2025-05-02
Payer: MEDICAID

## 2025-05-02 DIAGNOSIS — R56.9 SEIZURE (MULTI): ICD-10-CM

## 2025-05-02 RX ORDER — LACOSAMIDE 50 MG/1
TABLET ORAL
Qty: 180 TABLET | Refills: 1 | OUTPATIENT
Start: 2025-05-02

## 2025-05-02 RX ORDER — LACOSAMIDE 50 MG/1
50 TABLET ORAL 2 TIMES DAILY
Qty: 60 TABLET | Refills: 0 | OUTPATIENT
Start: 2025-05-02

## 2025-05-05 ENCOUNTER — PHARMACY VISIT (OUTPATIENT)
Dept: PHARMACY | Facility: CLINIC | Age: 60
End: 2025-05-05
Payer: MEDICAID

## 2025-05-05 PROCEDURE — RXMED WILLOW AMBULATORY MEDICATION CHARGE

## 2025-05-05 RX ORDER — LACOSAMIDE 50 MG/1
TABLET ORAL
Qty: 180 TABLET | Refills: 0 | OUTPATIENT
Start: 2025-05-02

## 2025-05-07 ENCOUNTER — PHARMACY VISIT (OUTPATIENT)
Dept: PHARMACY | Facility: CLINIC | Age: 60
End: 2025-05-07
Payer: MEDICAID

## 2025-05-07 PROCEDURE — RXMED WILLOW AMBULATORY MEDICATION CHARGE

## 2025-05-07 RX ORDER — DIPHENHYDRAMINE HCL 12.5MG/5ML
LIQUID (ML) ORAL
Qty: 100 ML | Refills: 0 | OUTPATIENT
Start: 2025-05-07

## 2025-05-07 RX ORDER — ALUMINUM HYDROXIDE, MAGNESIUM HYDROXIDE, AND SIMETHICONE 1200; 120; 1200 MG/30ML; MG/30ML; MG/30ML
SUSPENSION ORAL
Qty: 100 ML | Refills: 0 | OUTPATIENT
Start: 2025-05-07

## 2025-05-07 RX ORDER — LIDOCAINE HYDROCHLORIDE 20 MG/ML
SOLUTION OROPHARYNGEAL
Qty: 100 ML | Refills: 0 | OUTPATIENT
Start: 2025-05-07

## 2025-05-07 RX ORDER — BUTALBITAL, ACETAMINOPHEN AND CAFFEINE 50; 325; 40 MG/1; MG/1; MG/1
TABLET ORAL
Qty: 24 TABLET | Refills: 0 | OUTPATIENT
Start: 2025-05-07 | End: 2025-05-07

## 2025-05-07 RX ORDER — BUTALBITAL, ACETAMINOPHEN AND CAFFEINE 50; 325; 40 MG/1; MG/1; MG/1
TABLET ORAL
Qty: 24 TABLET | Refills: 0 | OUTPATIENT
Start: 2025-05-07

## 2025-05-08 ENCOUNTER — PHARMACY VISIT (OUTPATIENT)
Dept: PHARMACY | Facility: CLINIC | Age: 60
End: 2025-05-08

## 2025-05-13 PROCEDURE — RXMED WILLOW AMBULATORY MEDICATION CHARGE

## 2025-05-16 ENCOUNTER — PHARMACY VISIT (OUTPATIENT)
Dept: PHARMACY | Facility: CLINIC | Age: 60
End: 2025-05-16
Payer: MEDICAID

## 2025-05-16 DIAGNOSIS — K12.1 STOMATITIS: Primary | ICD-10-CM

## 2025-05-16 PROCEDURE — RXMED WILLOW AMBULATORY MEDICATION CHARGE

## 2025-05-16 RX ORDER — ALUMINUM HYDROXIDE, MAGNESIUM HYDROXIDE, AND SIMETHICONE 1200; 120; 1200 MG/30ML; MG/30ML; MG/30ML
SUSPENSION ORAL
Qty: 100 ML | Refills: 0 | Status: SHIPPED | OUTPATIENT
Start: 2025-05-16

## 2025-05-16 RX ORDER — LIDOCAINE HYDROCHLORIDE 20 MG/ML
SOLUTION OROPHARYNGEAL
Qty: 100 ML | Refills: 0 | Status: SHIPPED | OUTPATIENT
Start: 2025-05-16

## 2025-05-16 RX ORDER — DIPHENHYDRAMINE HCL 12.5MG/5ML
LIQUID (ML) ORAL
Qty: 100 ML | Refills: 0 | Status: SHIPPED | OUTPATIENT
Start: 2025-05-16

## 2025-05-23 DIAGNOSIS — R73.9 HYPERGLYCEMIA: Primary | ICD-10-CM

## 2025-05-23 DIAGNOSIS — E11.65 TYPE 2 DIABETES MELLITUS WITH HYPERGLYCEMIA, UNSPECIFIED WHETHER LONG TERM INSULIN USE (MULTI): ICD-10-CM

## 2025-05-23 PROCEDURE — RXMED WILLOW AMBULATORY MEDICATION CHARGE

## 2025-05-23 RX ORDER — DEXTROSE 4 G
1 TABLET,CHEWABLE ORAL DAILY
Qty: 1 EACH | Refills: 0 | Status: SHIPPED | OUTPATIENT
Start: 2025-05-23

## 2025-05-23 RX ORDER — BLOOD-GLUCOSE CONTROL, NORMAL
1 EACH MISCELLANEOUS DAILY
Qty: 100 EACH | Refills: 3 | Status: SHIPPED | OUTPATIENT
Start: 2025-05-23

## 2025-05-23 RX ORDER — BLOOD-GLUCOSE METER
1 EACH MISCELLANEOUS
Qty: 1 EACH | Refills: 1 | Status: SHIPPED | OUTPATIENT
Start: 2025-05-25

## 2025-05-23 RX ORDER — CALCIUM CITRATE/VITAMIN D3 200MG-6.25
1 TABLET ORAL DAILY
Qty: 100 EACH | Refills: 3 | Status: SHIPPED | OUTPATIENT
Start: 2025-05-23

## 2025-05-27 ENCOUNTER — PHARMACY VISIT (OUTPATIENT)
Dept: PHARMACY | Facility: CLINIC | Age: 60
End: 2025-05-27
Payer: MEDICAID

## 2025-05-27 ENCOUNTER — TELEPHONE (OUTPATIENT)
Dept: PRIMARY CARE | Facility: CLINIC | Age: 60
End: 2025-05-27
Payer: MEDICAID

## 2025-05-27 PROCEDURE — RXMED WILLOW AMBULATORY MEDICATION CHARGE

## 2025-05-27 RX ORDER — GABAPENTIN 600 MG/1
600 TABLET ORAL 3 TIMES DAILY
Qty: 270 TABLET | Refills: 3 | OUTPATIENT
Start: 2025-05-27

## 2025-05-27 RX ORDER — BUTALBITAL, ACETAMINOPHEN AND CAFFEINE 50; 325; 40 MG/1; MG/1; MG/1
TABLET ORAL
Qty: 60 TABLET | Refills: 5 | OUTPATIENT
Start: 2025-05-27

## 2025-05-27 RX ORDER — LACOSAMIDE 50 MG/1
TABLET ORAL
Qty: 180 TABLET | Refills: 0 | OUTPATIENT
Start: 2025-05-27

## 2025-05-27 NOTE — CARE PLAN
Patient stopped in office does not want automatic refills on his medication.   The patient's goals for the shift include      The clinical goals for the shift include free from  falls.  suicide and seizure precautions    Over the shift, the patient did not make progress toward the following goals. Barriers to progression include mental status. Recommendations to address these barriers include none.

## 2025-05-27 NOTE — TELEPHONE ENCOUNTER
This patient is calling because she does not want to have her diabetic supplies provided to her by any outside company.  She wants everything to go through the pharmacy at [facility]

## 2025-06-01 DIAGNOSIS — E03.9 ACQUIRED HYPOTHYROIDISM: ICD-10-CM

## 2025-06-02 PROCEDURE — RXMED WILLOW AMBULATORY MEDICATION CHARGE

## 2025-06-03 ENCOUNTER — PHARMACY VISIT (OUTPATIENT)
Dept: PHARMACY | Facility: CLINIC | Age: 60
End: 2025-06-03
Payer: MEDICAID

## 2025-06-03 ENCOUNTER — PRE-ADMISSION TESTING (OUTPATIENT)
Dept: PREADMISSION TESTING | Facility: HOSPITAL | Age: 60
End: 2025-06-03
Payer: MEDICAID

## 2025-06-03 ENCOUNTER — ANESTHESIA EVENT (OUTPATIENT)
Dept: GASTROENTEROLOGY | Facility: HOSPITAL | Age: 60
End: 2025-06-03
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
## 2025-06-06 ENCOUNTER — APPOINTMENT (OUTPATIENT)
Dept: PRIMARY CARE | Facility: CLINIC | Age: 60
End: 2025-06-06
Payer: MEDICAID

## 2025-06-06 RX ORDER — PANTOPRAZOLE SODIUM 40 MG/1
40 TABLET, DELAYED RELEASE ORAL
COMMUNITY
Start: 2024-09-18

## 2025-06-06 RX ORDER — LANOLIN ALCOHOL/MO/W.PET/CERES
1 CREAM (GRAM) TOPICAL
COMMUNITY
Start: 2024-08-12

## 2025-06-11 RX ORDER — SODIUM CHLORIDE, SODIUM LACTATE, POTASSIUM CHLORIDE, CALCIUM CHLORIDE 600; 310; 30; 20 MG/100ML; MG/100ML; MG/100ML; MG/100ML
50 INJECTION, SOLUTION INTRAVENOUS CONTINUOUS
OUTPATIENT
Start: 2025-06-11 | End: 2025-06-11

## 2025-06-17 ENCOUNTER — APPOINTMENT (OUTPATIENT)
Dept: GASTROENTEROLOGY | Facility: HOSPITAL | Age: 60
End: 2025-06-17
Payer: MEDICAID

## 2025-06-17 ENCOUNTER — ANESTHESIA (OUTPATIENT)
Dept: GASTROENTEROLOGY | Facility: HOSPITAL | Age: 60
End: 2025-06-17

## 2025-07-08 DIAGNOSIS — Z12.31 ENCOUNTER FOR SCREENING MAMMOGRAM FOR BREAST CANCER: ICD-10-CM

## 2025-07-18 DIAGNOSIS — I10 PRIMARY HYPERTENSION: ICD-10-CM

## 2025-07-18 DIAGNOSIS — Z79.899 MEDICATION MANAGEMENT: ICD-10-CM

## 2025-07-27 RX ORDER — LOPERAMIDE HYDROCHLORIDE 2 MG/1
CAPSULE ORAL
Qty: 20 CAPSULE | Refills: 1 | OUTPATIENT
Start: 2025-07-27

## 2025-07-27 RX ORDER — AMLODIPINE BESYLATE 10 MG/1
10 TABLET ORAL DAILY
Qty: 30 TABLET | Refills: 11 | OUTPATIENT
Start: 2025-07-27